# Patient Record
Sex: FEMALE | Race: WHITE | NOT HISPANIC OR LATINO | Employment: OTHER | URBAN - METROPOLITAN AREA
[De-identification: names, ages, dates, MRNs, and addresses within clinical notes are randomized per-mention and may not be internally consistent; named-entity substitution may affect disease eponyms.]

---

## 2019-07-23 ENCOUNTER — OFFICE VISIT (OUTPATIENT)
Dept: MEDICAL GROUP | Age: 64
End: 2019-07-23
Payer: COMMERCIAL

## 2019-07-23 VITALS
HEART RATE: 66 BPM | DIASTOLIC BLOOD PRESSURE: 72 MMHG | OXYGEN SATURATION: 95 % | HEIGHT: 63 IN | TEMPERATURE: 97.9 F | SYSTOLIC BLOOD PRESSURE: 120 MMHG | BODY MASS INDEX: 25.16 KG/M2 | WEIGHT: 142 LBS

## 2019-07-23 DIAGNOSIS — F33.1 MODERATE EPISODE OF RECURRENT MAJOR DEPRESSIVE DISORDER (HCC): ICD-10-CM

## 2019-07-23 DIAGNOSIS — F41.9 ANXIETY: ICD-10-CM

## 2019-07-23 DIAGNOSIS — M75.122 NONTRAUMATIC COMPLETE TEAR OF LEFT ROTATOR CUFF: ICD-10-CM

## 2019-07-23 DIAGNOSIS — M54.41 CHRONIC BILATERAL LOW BACK PAIN WITH BILATERAL SCIATICA: ICD-10-CM

## 2019-07-23 DIAGNOSIS — G89.29 CHRONIC BILATERAL LOW BACK PAIN WITH BILATERAL SCIATICA: ICD-10-CM

## 2019-07-23 DIAGNOSIS — M54.42 CHRONIC BILATERAL LOW BACK PAIN WITH BILATERAL SCIATICA: ICD-10-CM

## 2019-07-23 DIAGNOSIS — E78.00 HYPERCHOLESTEROLEMIA: ICD-10-CM

## 2019-07-23 PROCEDURE — 99204 OFFICE O/P NEW MOD 45 MIN: CPT | Performed by: INTERNAL MEDICINE

## 2019-07-23 RX ORDER — CYCLOBENZAPRINE HCL 10 MG
10 TABLET ORAL 3 TIMES DAILY PRN
COMMUNITY
End: 2019-07-23

## 2019-07-23 RX ORDER — ESCITALOPRAM OXALATE 20 MG/1
20 TABLET ORAL DAILY
Qty: 90 TAB | Refills: 3 | Status: SHIPPED | OUTPATIENT
Start: 2019-07-23 | End: 2020-03-27 | Stop reason: SDUPTHER

## 2019-07-23 RX ORDER — MELOXICAM 7.5 MG/1
7.5 TABLET ORAL DAILY
COMMUNITY
End: 2019-07-23

## 2019-07-23 RX ORDER — MIRTAZAPINE 7.5 MG/1
7.5 TABLET, FILM COATED ORAL
Qty: 90 TAB | Refills: 3 | Status: SHIPPED | OUTPATIENT
Start: 2019-07-23 | End: 2020-03-27 | Stop reason: SDUPTHER

## 2019-07-23 RX ORDER — SIMVASTATIN 40 MG
40 TABLET ORAL EVERY EVENING
Qty: 90 TAB | Refills: 3 | Status: SHIPPED | OUTPATIENT
Start: 2019-07-23 | End: 2020-03-27 | Stop reason: SDUPTHER

## 2019-07-23 RX ORDER — MIRTAZAPINE 15 MG/1
15 TABLET, FILM COATED ORAL NIGHTLY
COMMUNITY
End: 2019-07-23 | Stop reason: SDUPTHER

## 2019-07-23 RX ORDER — ESCITALOPRAM OXALATE 10 MG/1
10 TABLET ORAL DAILY
COMMUNITY
End: 2019-07-23 | Stop reason: SDUPTHER

## 2019-07-23 RX ORDER — SIMVASTATIN 40 MG
40 TABLET ORAL NIGHTLY
COMMUNITY
End: 2019-07-23 | Stop reason: SDUPTHER

## 2019-07-23 ASSESSMENT — PATIENT HEALTH QUESTIONNAIRE - PHQ9: CLINICAL INTERPRETATION OF PHQ2 SCORE: 0

## 2019-07-23 ASSESSMENT — PAIN SCALES - GENERAL: PAINLEVEL: 7=MODERATE-SEVERE PAIN

## 2019-07-24 ENCOUNTER — HOSPITAL ENCOUNTER (OUTPATIENT)
Dept: LAB | Facility: MEDICAL CENTER | Age: 64
End: 2019-07-24
Attending: INTERNAL MEDICINE
Payer: COMMERCIAL

## 2019-07-24 DIAGNOSIS — F41.9 ANXIETY: ICD-10-CM

## 2019-07-24 DIAGNOSIS — M54.41 CHRONIC BILATERAL LOW BACK PAIN WITH BILATERAL SCIATICA: ICD-10-CM

## 2019-07-24 DIAGNOSIS — F33.1 MODERATE EPISODE OF RECURRENT MAJOR DEPRESSIVE DISORDER (HCC): ICD-10-CM

## 2019-07-24 DIAGNOSIS — M54.42 CHRONIC BILATERAL LOW BACK PAIN WITH BILATERAL SCIATICA: ICD-10-CM

## 2019-07-24 DIAGNOSIS — G89.29 CHRONIC BILATERAL LOW BACK PAIN WITH BILATERAL SCIATICA: ICD-10-CM

## 2019-07-24 DIAGNOSIS — E78.00 HYPERCHOLESTEROLEMIA: ICD-10-CM

## 2019-07-24 LAB
ALBUMIN SERPL BCP-MCNC: 4.2 G/DL (ref 3.2–4.9)
ALBUMIN/GLOB SERPL: 1.4 G/DL
ALP SERPL-CCNC: 78 U/L (ref 30–99)
ALT SERPL-CCNC: 24 U/L (ref 2–50)
ANION GAP SERPL CALC-SCNC: 8 MMOL/L (ref 0–11.9)
AST SERPL-CCNC: 18 U/L (ref 12–45)
BASOPHILS # BLD AUTO: 1.3 % (ref 0–1.8)
BASOPHILS # BLD: 0.07 K/UL (ref 0–0.12)
BILIRUB SERPL-MCNC: 0.6 MG/DL (ref 0.1–1.5)
BUN SERPL-MCNC: 17 MG/DL (ref 8–22)
CALCIUM SERPL-MCNC: 9.7 MG/DL (ref 8.5–10.5)
CHLORIDE SERPL-SCNC: 105 MMOL/L (ref 96–112)
CHOLEST SERPL-MCNC: 225 MG/DL (ref 100–199)
CO2 SERPL-SCNC: 27 MMOL/L (ref 20–33)
CREAT SERPL-MCNC: 0.82 MG/DL (ref 0.5–1.4)
EOSINOPHIL # BLD AUTO: 0.13 K/UL (ref 0–0.51)
EOSINOPHIL NFR BLD: 2.5 % (ref 0–6.9)
ERYTHROCYTE [DISTWIDTH] IN BLOOD BY AUTOMATED COUNT: 45.8 FL (ref 35.9–50)
FASTING STATUS PATIENT QL REPORTED: NORMAL
GLOBULIN SER CALC-MCNC: 3 G/DL (ref 1.9–3.5)
GLUCOSE SERPL-MCNC: 89 MG/DL (ref 65–99)
HCT VFR BLD AUTO: 40.8 % (ref 37–47)
HDLC SERPL-MCNC: 47 MG/DL
HGB BLD-MCNC: 13.5 G/DL (ref 12–16)
IMM GRANULOCYTES # BLD AUTO: 0.01 K/UL (ref 0–0.11)
IMM GRANULOCYTES NFR BLD AUTO: 0.2 % (ref 0–0.9)
LDLC SERPL CALC-MCNC: 142 MG/DL
LYMPHOCYTES # BLD AUTO: 1.59 K/UL (ref 1–4.8)
LYMPHOCYTES NFR BLD: 30.4 % (ref 22–41)
MCH RBC QN AUTO: 30.9 PG (ref 27–33)
MCHC RBC AUTO-ENTMCNC: 33.1 G/DL (ref 33.6–35)
MCV RBC AUTO: 93.4 FL (ref 81.4–97.8)
MONOCYTES # BLD AUTO: 0.44 K/UL (ref 0–0.85)
MONOCYTES NFR BLD AUTO: 8.4 % (ref 0–13.4)
NEUTROPHILS # BLD AUTO: 2.99 K/UL (ref 2–7.15)
NEUTROPHILS NFR BLD: 57.2 % (ref 44–72)
NRBC # BLD AUTO: 0 K/UL
NRBC BLD-RTO: 0 /100 WBC
PLATELET # BLD AUTO: 384 K/UL (ref 164–446)
PMV BLD AUTO: 9.4 FL (ref 9–12.9)
POTASSIUM SERPL-SCNC: 4 MMOL/L (ref 3.6–5.5)
PROT SERPL-MCNC: 7.2 G/DL (ref 6–8.2)
RBC # BLD AUTO: 4.37 M/UL (ref 4.2–5.4)
SODIUM SERPL-SCNC: 140 MMOL/L (ref 135–145)
T4 FREE SERPL-MCNC: 0.74 NG/DL (ref 0.53–1.43)
TRIGL SERPL-MCNC: 181 MG/DL (ref 0–149)
TSH SERPL DL<=0.005 MIU/L-ACNC: 1.26 UIU/ML (ref 0.38–5.33)
WBC # BLD AUTO: 5.2 K/UL (ref 4.8–10.8)

## 2019-07-24 PROCEDURE — 85025 COMPLETE CBC W/AUTO DIFF WBC: CPT

## 2019-07-24 PROCEDURE — 84439 ASSAY OF FREE THYROXINE: CPT

## 2019-07-24 PROCEDURE — 36415 COLL VENOUS BLD VENIPUNCTURE: CPT

## 2019-07-24 PROCEDURE — 80053 COMPREHEN METABOLIC PANEL: CPT

## 2019-07-24 PROCEDURE — 84443 ASSAY THYROID STIM HORMONE: CPT

## 2019-07-24 PROCEDURE — 80061 LIPID PANEL: CPT

## 2019-07-24 NOTE — PROGRESS NOTES
Parminder Merlos is a 64 y.o. female here to establish care and the evaluation and management of:    HPI:    She recently moved to the area from [place], [place]. She is  and has two grown children. Prior history of alcohol abuse associated with liver disease. She stopped drinking in 2016. She was a former smoker for 20 years and reportedly quit in 2009. Significant family history of cardiac disease.      Moderate episode of recurrent major depressive disorder (HCC)  Anxiety  She has a chronic history of anxiety and depression since the age of 14. She suffered from anxiety attacks during childhood. Her depression and mood are under good control with Lexapro 20 mg once daily and Mirtazapine 7.5 mg once daily. She was previously followed by psychiatry and would like to establish with a provider in the area. Negative for suicidal ideations or plan. She has good support through her spouse.  Lexapro was increased from 10 mg daily to 20 mg daily by psychiatrist recently.    Hypercholesterolemia  Chronic, stable history of hypercholesteremia. Currently taking Simvastatin 40 mg once daily as directed. No medication side effects were reported including myalgias or abdominal pain. She is not taking a daily Aspirin. No acute complaints of dizziness, claudication or chest pain.     Chronic bilateral low back pain with bilateral sciatica  She has a 1-2 year history of chronic pain to her low back radiating to her hips and bilateral lower extremities. She has had imaging completed in the past revealing arthritic changes. Pain is constant and exacerbated with walking up stairs. She is treating her symptoms with Meloxicam 7.5 mg once daily and Flexeril 5 mg once daily as needed. She has taken Meloxicam daily for the past two years. No active bleeding, hematochezia or melena. Pain is unchanged with Tylenol. She denies any opioid use. She has completed physical therapy in the past with no improvement. She additionally  complains of joint pain to her hands. She previously tested negative for rheumatoid arthritis. She has a history of alcohol abuse associated with liver disease. She stopped drinking alcohol in 2016.     Nontraumatic complete tear of left rotator cuff  She has a history of a rotator cuff tear to her left shoulder which was visualized on MRI completed this year in [place]. She was previously informed that she would require surgical repair. She is requesting a referral to Orthopaedics to establish care.      Current medicines (including changes today)  Current Outpatient Prescriptions   Medication Sig Dispense Refill   • mirtazapine (REMERON) 7.5 MG tablet Take 1 Tab by mouth every bedtime. 90 Tab 3   • escitalopram (LEXAPRO) 20 MG tablet Take 1 Tab by mouth every day. 90 Tab 3   • simvastatin (ZOCOR) 40 MG Tab Take 1 Tab by mouth every evening. 90 Tab 3     No current facility-administered medications for this visit.      She  has a past medical history of Anxiety; Depression; and Hyperlipidemia.  She  has a past surgical history that includes primary c section and shoulder surgery (Left).  Social History   Substance Use Topics   • Smoking status: Former Smoker     Packs/day: 1.00     Years: 20.00     Types: Cigarettes     Quit date: 2009   • Smokeless tobacco: Never Used   • Alcohol use No      Comment: former alcoholic, stopped 06/12/2016     Social History     Social History Narrative   • No narrative on file     Family History   Problem Relation Age of Onset   • Dementia Mother    • Heart Disease Mother         pacemaker   • Heart Disease Father         congestive heart failure   • Alcohol/Drug Paternal Uncle    • Dementia Maternal Grandmother    • Alcohol/Drug Maternal Grandfather    • Stroke Paternal Grandmother    • Heart Attack Paternal Grandfather      No family status information on file.     The patient is not eligible for Health Maintenance      ROS  Gen.: Denied weight change, appetite change or  "fatigue.  ENT: Denied sinus tenderness, nasal congestion, runny nose or sore throat  CVS: Denied chest pain, palpitations or leg swelling.  Respiratory: Denied cough, shortness of breath or wheezing.  GI: Denied abdominal pain, constipation or diarrhea.  Endocrine: Denied temperature intolerance, increased frequency of urination, polyphagia or polydipsia.  Musculoskeletal: Lower back pain and hips pain.    All other systems reviewed and are negative. See HPI for further details.       Objective:     /72 (BP Location: Right arm, Patient Position: Sitting, BP Cuff Size: Adult)   Pulse 66   Temp 36.6 °C (97.9 °F) (Temporal)   Ht 1.607 m (5' 3.27\")   Wt 64.4 kg (142 lb)   SpO2 95%  Body mass index is 24.94 kg/m².     Physical Exam:  Constitutional: Well nourished and Well developed, Alert, no distress.  Skin: Warm, dry, good turgor, no rashes in visible areas.  Eye: Equal, round and reactive, conjunctiva clear, lids normal.  ENMT: Lips without lesions, good dentition, oropharynx clear.  Neck: Trachea midline, no masses, no thyromegaly. No cervical or supraclavicular lymphadenopathy.  Respiratory: Unlabored respiratory effort, lungs clear to auscultation, no wheezes, no rhonchi.  Cardiovascular: Normal S1, S2, no murmur, no edema. No carotid bruits.  Abdomen: Soft, non distended, non-tender, no masses, no hepatosplenomegaly. Bowel sound normal.  Extremities: No edema, no clubbing, no cyanosis.  Psych: Alert and oriented x3, normal affect and mood.      Assessment and Plan:   The following treatment plan was discussed:    1. Hypercholesterolemia  - Plan to complete labs to establish a baseline. Plan to continue current regimen of Simvastatin 40 mg once daily. Reviewed the risks and benefits of treatment and potential side effects of medication.  - Recommended she follow low fat, low carbohydrate and high fiber diet. Additionally, patient was asked to exercise regularly including frequent cardio.  - Recheck lab " 1-2 weeks before next follow up visit.  - simvastatin (ZOCOR) 40 MG Tab; Take 1 Tab by mouth every evening.  Dispense: 90 Tab; Refill: 3  - Comp Metabolic Panel; Future  - Lipid Profile; Future    2. Chronic bilateral low back pain with bilateral sciatica  - Chronic, stable history for 1-2 years of chronic pain to low back radiating to hips and bilateral lower extremities. Per patient, imaging has indicated osteoarthritis. General complaint of diffuse joint pain. Previously tested negative for rheumatoid arthritis.  - She was encouraged to discontinue Meloxicam secondary to medication interactions of possible GI bleed with Lexapro. Meloxicam has been taken daily for the past two years. No active bleeding, hematochezia or melena.  - Low dose Tylenol is recommended for pain. She was asked to avoid NSAIDs due to their medication side effects.  - She was encouraged to complete gentle stretching and exercises of her back.  - Flexeril will not be refilled.   - She will be referred to Physiatry.  - CBC WITH DIFFERENTIAL; Future  - Comp Metabolic Panel; Future  - REFERRAL TO PHYSIATRY (PMR)    3. Moderate episode of recurrent major depressive disorder (HCC)  - Chronic, stable history of depression. Under good control with Lexapro 20 mg once daily and Mirtazapine 7.5 mg once daily. Refills were provided.  - She was asked to seek medical attention for suicidal ideations or plan.   - Plan to complete routine labs.  - The referral to psychiatrist was placed.  - mirtazapine (REMERON) 7.5 MG tablet; Take 1 Tab by mouth every bedtime.  Dispense: 90 Tab; Refill: 3  - escitalopram (LEXAPRO) 20 MG tablet; Take 1 Tab by mouth every day.  Dispense: 90 Tab; Refill: 3  - CBC WITH DIFFERENTIAL; Future  - Comp Metabolic Panel; Future  - TSH; Future  - FREE THYROXINE; Future  - REFERRAL TO PSYCHIATRY    4. Anxiety  - Chronic, stable history of depression. Under good control with Lexapro 20 mg once daily and Mirtazapine 7.5 mg once daily.    - She was asked to seek medical attention for suicidal ideations or plan.   - Plan to complete routine labs.  - CBC WITH DIFFERENTIAL; Future  - Comp Metabolic Panel; Future  - REFERRAL TO PSYCHIATRY    5. Nontraumatic complete tear of left rotator cuff  - Low dose Tylenol is recommended for pain. She was asked to avoid NSAIDs due to their medication side effects.  Refer to orthopedist.  - REFERRAL TO ORTHOPEDICS      Records requested.    Followup: Return in about 1 week (around 7/30/2019), or if symptoms worsen or fail to improve, for Hyperlipidemia, lower back pain, Depression, Anxiety, Lab review. Return sooner should new symptoms or problems arise. Recommended for her to seek medical attention for worsening depression, anxiety, suicidal ideations or plan.      Please note that this dictation was created using voice recognition software. I have made every reasonable attempt to correct obvious errors, but I expect that there may have unintended errors in text, spelling, punctuation, or grammar that I did not discover.    IMarianne (Scribe), am scribing for, and in the presence of, Ban Covington M.D.    Electronically signed by: Marianne Whelan (Scribe), 7/23/2019    I, Ban Covington M.D., personally performed the services described in this documentation, as scribed by Marianne Whelan in my presence, and it is both accurate and complete.

## 2019-07-30 ENCOUNTER — OFFICE VISIT (OUTPATIENT)
Dept: MEDICAL GROUP | Age: 64
End: 2019-07-30
Payer: COMMERCIAL

## 2019-07-30 VITALS
SYSTOLIC BLOOD PRESSURE: 100 MMHG | BODY MASS INDEX: 25.12 KG/M2 | HEART RATE: 77 BPM | OXYGEN SATURATION: 96 % | DIASTOLIC BLOOD PRESSURE: 56 MMHG | WEIGHT: 141.8 LBS | HEIGHT: 63 IN | TEMPERATURE: 99.1 F

## 2019-07-30 DIAGNOSIS — E78.00 HYPERCHOLESTEROLEMIA: ICD-10-CM

## 2019-07-30 DIAGNOSIS — Z11.59 NEED FOR HEPATITIS C SCREENING TEST: ICD-10-CM

## 2019-07-30 DIAGNOSIS — G89.29 CHRONIC BILATERAL LOW BACK PAIN WITH BILATERAL SCIATICA: ICD-10-CM

## 2019-07-30 DIAGNOSIS — M54.41 CHRONIC BILATERAL LOW BACK PAIN WITH BILATERAL SCIATICA: ICD-10-CM

## 2019-07-30 DIAGNOSIS — F33.1 MODERATE EPISODE OF RECURRENT MAJOR DEPRESSIVE DISORDER (HCC): ICD-10-CM

## 2019-07-30 DIAGNOSIS — F41.9 ANXIETY: ICD-10-CM

## 2019-07-30 DIAGNOSIS — M54.42 CHRONIC BILATERAL LOW BACK PAIN WITH BILATERAL SCIATICA: ICD-10-CM

## 2019-07-30 PROCEDURE — 99214 OFFICE O/P EST MOD 30 MIN: CPT | Performed by: INTERNAL MEDICINE

## 2019-07-30 RX ORDER — GABAPENTIN 300 MG/1
300 CAPSULE ORAL 3 TIMES DAILY PRN
Qty: 90 CAP | Refills: 3 | Status: SHIPPED | OUTPATIENT
Start: 2019-07-30 | End: 2019-09-20

## 2019-07-30 ASSESSMENT — PATIENT HEALTH QUESTIONNAIRE - PHQ9
SUM OF ALL RESPONSES TO PHQ QUESTIONS 1-9: 0
SUM OF ALL RESPONSES TO PHQ9 QUESTIONS 1 AND 2: 0
7. TROUBLE CONCENTRATING ON THINGS, SUCH AS READING THE NEWSPAPER OR WATCHING TELEVISION: NOT AT ALL
9. THOUGHTS THAT YOU WOULD BE BETTER OFF DEAD, OR OF HURTING YOURSELF: NOT AT ALL
4. FEELING TIRED OR HAVING LITTLE ENERGY: NOT AT ALL
2. FEELING DOWN, DEPRESSED, IRRITABLE, OR HOPELESS: NOT AT ALL
8. MOVING OR SPEAKING SO SLOWLY THAT OTHER PEOPLE COULD HAVE NOTICED. OR THE OPPOSITE, BEING SO FIGETY OR RESTLESS THAT YOU HAVE BEEN MOVING AROUND A LOT MORE THAN USUAL: NOT AT ALL
1. LITTLE INTEREST OR PLEASURE IN DOING THINGS: NOT AT ALL
6. FEELING BAD ABOUT YOURSELF - OR THAT YOU ARE A FAILURE OR HAVE LET YOURSELF OR YOUR FAMILY DOWN: NOT AL ALL
5. POOR APPETITE OR OVEREATING: NOT AT ALL
3. TROUBLE FALLING OR STAYING ASLEEP OR SLEEPING TOO MUCH: NOT AT ALL

## 2019-07-30 ASSESSMENT — PAIN SCALES - GENERAL: PAINLEVEL: 8=MODERATE-SEVERE PAIN

## 2019-07-30 NOTE — PROGRESS NOTES
Subjective:   Lizy Mak is a 64 y.o. female here today for evaluation and management of:    Hypercholesterolemia  Patient is taking Simvastatin 40 mg every evening. Recent blood tests done on 7/24/19 indicate elevated cholesterol levels. She has been on Simvastatin in the past. She went off after moving to [place] because she did not have a prescription but started Simvastatin again 2 months ago. She will keep current regimens for now since she just restarted medication and will recheck labs in 3 months.     Results for LIZY MAK (MRN [id number]) as of 7/30/2019 15:32   Ref. Range 7/24/2019 07:55   Cholesterol,Tot Latest Ref Range: 100 - 199 mg/dL 225 (H)   Triglycerides Latest Ref Range: 0 - 149 mg/dL 181 (H)   HDL Latest Ref Range: >=40 mg/dL 47   LDL Latest Ref Range: <100 mg/dL 142 (H)     Moderate episode of recurrent major depressive disorder (HCC)  Anxiety  Chronic history of anxiety and depression. Her depression and mood are under good control with Lexapro 20 mg once daily and Mirtazapine 7.5 mg once daily. Patient takes Mirtazapine to help her depression and to help her sleep and she states that this medication works well for her. She denies side effects from taking Lexapro or Mirtazapine. She denies suicidal ideation or plan or homicidal ideation or plan. She was referred to psychiatrist during her last clinic visit who she has not yet seen. Patient is  and lives with her . She has a happy marriage and good relationship with her . Additionally, patient states that she went to a job interview today.     Chronic bilateral low back pain with bilateral sciatica  Patient has chronic bilateral lower back pain with shooting pain to her hips that radiates down both lower extremities. She had imaging done in the past about 1-2 years ago revealing arthritic changes. Her pain is constant and exacerbated with walking. She has taken Meloxicam 7.5 mg in the past for many years but stopped after  "her last clinic visit due to medication interactions and possibility of GI bleed while taking Lexapro. During last clinic visit she was referred to physiatry which she has received a letter from but has not yet had the chance to see. Discussed treating her pain with Gabapentin. She would like to try this medication and still plans to follow up with physiatry.       Current medicines (including changes today)  Current Outpatient Prescriptions   Medication Sig Dispense Refill   • gabapentin (NEURONTIN) 300 MG Cap Take 1 Cap by mouth 3 times a day as needed. 90 Cap 3   • mirtazapine (REMERON) 7.5 MG tablet Take 1 Tab by mouth every bedtime. 90 Tab 3   • escitalopram (LEXAPRO) 20 MG tablet Take 1 Tab by mouth every day. 90 Tab 3   • simvastatin (ZOCOR) 40 MG Tab Take 1 Tab by mouth every evening. 90 Tab 3     No current facility-administered medications for this visit.      She  has a past medical history of Anxiety; Depression; and Hyperlipidemia.    ROS   No chest pain, no shortness of breath, no abdominal pain       Objective:     /56 (BP Location: Right arm, Patient Position: Sitting, BP Cuff Size: Adult)   Pulse 77   Temp 37.3 °C (99.1 °F) (Temporal)   Ht 1.607 m (5' 3.27\")   Wt 64.3 kg (141 lb 12.8 oz)   SpO2 96%  Body mass index is 24.9 kg/m².     Physical Exam:  General: Alert, oriented and no acute distress.  Eye contact is good, speech goal directed, affect calm  HEENT: conjunctiva non-injected, sclera non-icteric.  Oral mucous membranes pink and moist with no lesions.  Pinna normal.   Lungs: Normal respiratory effort, clear to auscultation bilaterally with good excursion.  CV: regular rate and rhythm. No murmurs.   Abdomen: soft, non distended, nontender, Bowel sound normal.  Ext: no edema, color normal, vascularity normal, temperature normal    Assessment and Plan:   The following treatment plan was discussed     1. Hypercholesterolemia  - Not at goal. Plan to continue current regimens, " Simvastatin 40 mg once daily, and then repeat labs in 3 months.   - Advised to eat low fat, low carbohydrate and high fiber diet as well as do cardio physical exercise regularly.   - Comp Metabolic Panel; Future  - Lipid Profile; Future    2. Moderate episode of recurrent major depressive disorder (HCC)  - Stable and well-controlled. Continue current regimens, Lexapro 20 mg once daily and Mirtazapine 7.5 mg once daily. Recheck lab 1-2 weeks before next follow up visit.  - Referral to see a psychiatrist was already placed. She will follow-up.   - Patient advised to seek medication attention for suicidal ideations or plan.   - Comp Metabolic Panel; Future    3. Anxiety  - See 2 above.   - Comp Metabolic Panel; Future    4. Chronic bilateral low back pain with bilateral sciatica  - Patient continue to have chronic shooting pain. She is not currently taking any medication and would like to try Gabapentin. I discussed the medication side effects and risks and benefits of Gabapentin with the patient. She is agreeable and will stop if she experiences side effects.  Patient is advised to start gabapentin at bedtime first and she may increase the dose slowly if she tolerates.  - Patient will still follow up with physiatry.   - Discussed having imaging done. Patient agrees. Ordered for x-ray of lumbar spine.   - Patient advised not to take Gabapentin with alcohol or other sedating medications.  - gabapentin (NEURONTIN) 300 MG Cap; Take 1 Cap by mouth 3 times a day as needed.  Dispense: 90 Cap; Refill: 3  - DX-LUMBAR SPINE-2 OR 3 VIEWS; Future    5. Need for hepatitis C screening test  - Discussed the importance of age related hepatitis C screening. Patient agrees.   - HEP C VIRUS ANTIBODY; Future    6. Health Maintenance   - Patient is due for tetanus and shingles vaccine. She is also due for mammogram and colonoscopy. She states that she had recently had all of these done prior to moving to [place]. She will try and get  records.       Followup: Return in about 3 months (around 10/30/2019), or if symptoms worsen or fail to improve, for Hyperlipidemia, lower back pain, Depression, Anxiety, Lab review.      Please note that this dictation was created using voice recognition software. I have made every reasonable attempt to correct obvious errors, but I expect that there may have unintended errors in text, spelling, punctuation, or grammar that I did not discover.    I, Michelle Solorzano (Aniibpenelope), am scribing for, and in the presence of, Ban Covington M.D..    Electronically signed by: Michelle Solorzano (Aniibe), 7/30/2019    I, Ban Covington M.D., personally performed the services described in this documentation, as scribed by Michelle Solorzano in my presence, and it is both accurate and complete.

## 2019-07-30 NOTE — PROGRESS NOTES
Pt states had both Colonoscopy Oct./Nov. 2018 and Mammogram Jan/Feb. 2019 through [facility], [place], [place] w/ Dr. Kae Brito. Obtained pt signed consent.

## 2019-07-30 NOTE — LETTER
[facility]  Ban Covington M.D.  [address]  [place] [address]  Fax: [phone]   Authorization for Release/Disclosure of   Protected Health Information   Name: MANJINDER MAK : 1955 SSN: [id number]   Address: [address]  [place] [address] Phone:    [phone] (home)    I authorize the entity listed below to release/disclose the PHI below to:   [facility]/Ban Covington M.D. and Ban Covington M.D.   Provider or Entity Name:  [facility]   Dr. Sasha Brewer   [facility], [facility]  [address]  [place] [address] Phone:   [phone]    Fax:   [phone]   Reason for request: continuity of care   Information to be released:    [  ] LAST COLONOSCOPY,  including any PATH REPORT and follow-up  [  ] LAST FIT/COLOGUARD RESULT [  ] LAST DEXA  [  ] LAST MAMMOGRAM  [  ] LAST PAP  [  ] LAST LABS [  ] RETINA EXAM REPORT  [  ] IMMUNIZATION RECORDS  [XXX ] Release all info      [  ] Check here and initial the line next to each item to release ALL health information INCLUDING  _____ Care and treatment for drug and / or alcohol abuse  _____ HIV testing, infection status, or AIDS  _____ Genetic Testing    DATES OF SERVICE OR TIME PERIOD TO BE DISCLOSED: _____________  I understand and acknowledge that:  * This Authorization may be revoked at any time by you in writing, except if your health information has already been used or disclosed.  * Your health information that will be used or disclosed as a result of you signing this authorization could be re-disclosed by the recipient. If this occurs, your re-disclosed health information may no longer be protected by State or Federal laws.  * You may refuse to sign this Authorization. Your refusal will not affect your ability to obtain treatment.  * This Authorization becomes effective upon signing and will  on (date) __________.      If no date is indicated, this Authorization will  one (1) year  from the signature date.    Name: Lizy Merlos    Signature:   Date:     7/30/2019       PLEASE FAX REQUESTED RECORDS BACK TO: [phone]

## 2019-07-31 ENCOUNTER — HOSPITAL ENCOUNTER (OUTPATIENT)
Dept: RADIOLOGY | Facility: MEDICAL CENTER | Age: 64
End: 2019-07-31
Attending: INTERNAL MEDICINE
Payer: COMMERCIAL

## 2019-07-31 DIAGNOSIS — M54.41 CHRONIC BILATERAL LOW BACK PAIN WITH BILATERAL SCIATICA: ICD-10-CM

## 2019-07-31 DIAGNOSIS — M54.42 CHRONIC BILATERAL LOW BACK PAIN WITH BILATERAL SCIATICA: ICD-10-CM

## 2019-07-31 DIAGNOSIS — G89.29 CHRONIC BILATERAL LOW BACK PAIN WITH BILATERAL SCIATICA: ICD-10-CM

## 2019-07-31 PROCEDURE — 72100 X-RAY EXAM L-S SPINE 2/3 VWS: CPT

## 2019-08-06 ENCOUNTER — TELEPHONE (OUTPATIENT)
Dept: MEDICAL GROUP | Age: 64
End: 2019-08-06

## 2019-08-06 DIAGNOSIS — G47.30 SLEEP APNEA, UNSPECIFIED TYPE: ICD-10-CM

## 2019-08-06 NOTE — TELEPHONE ENCOUNTER
1. Caller Name: Lizy Merlos                                           Call Back Number: [phone] (home)         Patient approves a detailed voicemail message: N\A    2. SPECIFIC Action To Be Taken: Referral pending, please sign.    3. Diagnosis/Clinical Reason for Request: Sleep Apnea    4. Specialty & Provider Name/Lab/Imaging Location: Sleep Studies    5. Is appointment scheduled for requested order/referral: no    Patient was informed they will receive a return phone call from the office ONLY if there are any questions before processing their request. Advised to call back if they haven't received a call from the referral department in 5 days.

## 2019-08-12 ENCOUNTER — OFFICE VISIT (OUTPATIENT)
Dept: MEDICAL GROUP | Age: 64
End: 2019-08-12
Payer: COMMERCIAL

## 2019-08-12 VITALS
HEIGHT: 63 IN | DIASTOLIC BLOOD PRESSURE: 52 MMHG | BODY MASS INDEX: 25.69 KG/M2 | OXYGEN SATURATION: 95 % | SYSTOLIC BLOOD PRESSURE: 90 MMHG | WEIGHT: 145 LBS | TEMPERATURE: 98.5 F | HEART RATE: 68 BPM

## 2019-08-12 DIAGNOSIS — N30.00 ACUTE CYSTITIS WITHOUT HEMATURIA: ICD-10-CM

## 2019-08-12 LAB
APPEARANCE UR: CLEAR
BILIRUB UR STRIP-MCNC: NEGATIVE MG/DL
COLOR UR AUTO: YELLOW
GLUCOSE UR STRIP.AUTO-MCNC: NEGATIVE MG/DL
KETONES UR STRIP.AUTO-MCNC: NEGATIVE MG/DL
LEUKOCYTE ESTERASE UR QL STRIP.AUTO: NORMAL
NITRITE UR QL STRIP.AUTO: NEGATIVE
PH UR STRIP.AUTO: 7 [PH] (ref 5–8)
PROT UR QL STRIP: NEGATIVE MG/DL
RBC UR QL AUTO: NEGATIVE
SP GR UR STRIP.AUTO: 1.02
UROBILINOGEN UR STRIP-MCNC: NORMAL MG/DL

## 2019-08-12 PROCEDURE — 81002 URINALYSIS NONAUTO W/O SCOPE: CPT | Performed by: INTERNAL MEDICINE

## 2019-08-12 PROCEDURE — 99214 OFFICE O/P EST MOD 30 MIN: CPT | Performed by: INTERNAL MEDICINE

## 2019-08-12 RX ORDER — NITROFURANTOIN 25; 75 MG/1; MG/1
100 CAPSULE ORAL 2 TIMES DAILY
Qty: 10 CAP | Refills: 0 | Status: SHIPPED | OUTPATIENT
Start: 2019-08-12 | End: 2019-08-17

## 2019-08-12 ASSESSMENT — PAIN SCALES - GENERAL: PAINLEVEL: NO PAIN

## 2019-08-12 NOTE — PROGRESS NOTES
Subjective:   Lizy Mak is a 64 y.o. female here today for evaluation and management of:    HPI:  This is a new problem.  She complains of urinary frequency and burning dysuria. Her symptoms began two days ago. She has not tried any OTC medication or cranberry juice. She denies fever, chills, nausea, vomiting, or flank pain. She denies a history of recurrent UTIs. Her last UTI was many years ago. She has no known drug allergies. I discussed the urine test with the patient in clinic today.    Results for LIZY MAK (MRN [id number]) as of 8/12/2019 13:52   Ref. Range 8/12/2019 13:47   POC Color Latest Ref Range: Negative  Yellow   POC Appearance Latest Ref Range: Negative  Clear   POC Specific Gravity Latest Ref Range: <1.005 - >1.030  1.020   POC Urine PH Latest Ref Range: 5.0 - 8.0  7.0   POC Glucose Latest Ref Range: Negative mg/dL Negative   POC Ketones Latest Ref Range: Negative mg/dL Negative   POC Protein Latest Ref Range: Negative mg/dL Negative   POC Nitrites Latest Ref Range: Negative  Negative   POC Leukocyte Esterase Latest Ref Range: Negative  Small   POC Blood Latest Ref Range: Negative  Negative   POC Bilirubin Latest Ref Range: Negative mg/dL Negative   POC Urobiligen Latest Ref Range: Negative (0.2) mg/dL 0.2 E.U. /dL     Current medicines (including changes today)  Current Outpatient Medications   Medication Sig Dispense Refill   • Diclofenac Sodium 1 % Gel      • nitrofurantoin monohyd macro (MACROBID) 100 MG Cap Take 1 Cap by mouth 2 times a day for 5 days. 10 Cap 0   • gabapentin (NEURONTIN) 300 MG Cap Take 1 Cap by mouth 3 times a day as needed. 90 Cap 3   • mirtazapine (REMERON) 7.5 MG tablet Take 1 Tab by mouth every bedtime. 90 Tab 3   • escitalopram (LEXAPRO) 20 MG tablet Take 1 Tab by mouth every day. 90 Tab 3   • simvastatin (ZOCOR) 40 MG Tab Take 1 Tab by mouth every evening. 90 Tab 3     No current facility-administered medications for this visit.      She  has a past medical history  "of Anxiety, Depression, and Hyperlipidemia.    ROS   No chest pain, no shortness of breath, no abdominal pain  No fever, chills, nausea, vomiting, or flank pain     Objective:     BP (!) 90/52 (BP Location: Right arm, Patient Position: Sitting, BP Cuff Size: Adult)   Pulse 68   Temp 36.9 °C (98.5 °F) (Temporal)   Ht 1.607 m (5' 3.27\")   Wt 65.8 kg (145 lb)   SpO2 95%  Body mass index is 25.47 kg/m².   Physical Exam:  General: Alert, oriented and no acute distress.  Eye contact is good, speech goal directed, affect calm  HEENT: conjunctiva non-injected, sclera non-icteric.  Oral mucous membranes pink and moist with no lesions.  Pinna normal.   Lungs: Normal respiratory effort, clear to auscultation bilaterally with good excursion.  CV: regular rate and rhythm. No murmurs.  Abdomen: soft, non distended, nontender, No CVAT, Bowel sound normal.  Ext: no edema, color normal, vascularity normal, temperature normal      Assessment and Plan:   The following treatment plan was discussed     1. Acute cystitis without hematuria  - I explained that her urinary frequency and burning dysuria are indicative of UTI. Also, her urinalysis today was positive for slightly elevated leukocyte esterase.  - I prescribed Macrobid 100 mg to take 1 capsule twice a day for 5 days. I discussed the potential side effects of nitrofurantoin. I recommended she continue taking a probiotic to prevent diarrhea or yeast infection.  She is advised to try cranberry as well.  - I recommended copious fluid intake and avoidance of spicy foods and caffeinated drinks.  - POCT Urinalysis  - nitrofurantoin monohyd macro (MACROBID) 100 MG Cap; Take 1 Cap by mouth 2 times a day for 5 days.  Dispense: 10 Cap; Refill: 0    Followup: Return in about 3 months (around 11/7/2019), or if symptoms worsen or fail to improve, for Hyperlipidemia, Depression, Anxiety, lower back pain, Lab review.      Please note that this dictation was created using voice recognition " software. I have made every reasonable attempt to correct obvious errors, but I expect that there may have unintended errors in text, spelling, punctuation, or grammar that I did not discover.    I, Reyes Alexnadre (Scribe), am scribing for, and in the presence of, Ban Covington M.D..    Electronically signed by: Reyes Alexandre (Scribe), 8/12/2019    I, Ban Covington M.D., personally performed the services described in this documentation, as scribed by Reyes Alexandre in my presence, and it is both accurate and complete.

## 2019-09-20 ENCOUNTER — APPOINTMENT (OUTPATIENT)
Dept: ADMISSIONS | Facility: MEDICAL CENTER | Age: 64
End: 2019-09-20
Attending: ORTHOPAEDIC SURGERY
Payer: COMMERCIAL

## 2019-09-20 ENCOUNTER — HOSPITAL ENCOUNTER (OUTPATIENT)
Dept: LAB | Facility: MEDICAL CENTER | Age: 64
End: 2019-09-20
Attending: INTERNAL MEDICINE
Payer: COMMERCIAL

## 2019-09-20 DIAGNOSIS — E78.00 HYPERCHOLESTEROLEMIA: ICD-10-CM

## 2019-09-20 DIAGNOSIS — Z11.59 NEED FOR HEPATITIS C SCREENING TEST: ICD-10-CM

## 2019-09-20 DIAGNOSIS — Z01.810 PRE-OPERATIVE CARDIOVASCULAR EXAMINATION: ICD-10-CM

## 2019-09-20 DIAGNOSIS — F33.1 MODERATE EPISODE OF RECURRENT MAJOR DEPRESSIVE DISORDER (HCC): ICD-10-CM

## 2019-09-20 DIAGNOSIS — F41.9 ANXIETY: ICD-10-CM

## 2019-09-20 LAB
ALBUMIN SERPL BCP-MCNC: 4.7 G/DL (ref 3.2–4.9)
ALBUMIN/GLOB SERPL: 2 G/DL
ALP SERPL-CCNC: 64 U/L (ref 30–99)
ALT SERPL-CCNC: 29 U/L (ref 2–50)
ANION GAP SERPL CALC-SCNC: 7 MMOL/L (ref 0–11.9)
AST SERPL-CCNC: 22 U/L (ref 12–45)
BILIRUB SERPL-MCNC: 0.6 MG/DL (ref 0.1–1.5)
BUN SERPL-MCNC: 16 MG/DL (ref 8–22)
CALCIUM SERPL-MCNC: 10.1 MG/DL (ref 8.5–10.5)
CHLORIDE SERPL-SCNC: 109 MMOL/L (ref 96–112)
CHOLEST SERPL-MCNC: 156 MG/DL (ref 100–199)
CO2 SERPL-SCNC: 24 MMOL/L (ref 20–33)
CREAT SERPL-MCNC: 0.77 MG/DL (ref 0.5–1.4)
EKG IMPRESSION: NORMAL
FASTING STATUS PATIENT QL REPORTED: NORMAL
GLOBULIN SER CALC-MCNC: 2.4 G/DL (ref 1.9–3.5)
GLUCOSE SERPL-MCNC: 80 MG/DL (ref 65–99)
HCV AB SER QL: NEGATIVE
HDLC SERPL-MCNC: 77 MG/DL
LDLC SERPL CALC-MCNC: 62 MG/DL
POTASSIUM SERPL-SCNC: 4.2 MMOL/L (ref 3.6–5.5)
PROT SERPL-MCNC: 7.1 G/DL (ref 6–8.2)
SODIUM SERPL-SCNC: 140 MMOL/L (ref 135–145)
TRIGL SERPL-MCNC: 85 MG/DL (ref 0–149)

## 2019-09-20 PROCEDURE — 80053 COMPREHEN METABOLIC PANEL: CPT

## 2019-09-20 PROCEDURE — 86803 HEPATITIS C AB TEST: CPT

## 2019-09-20 PROCEDURE — 36415 COLL VENOUS BLD VENIPUNCTURE: CPT

## 2019-09-20 PROCEDURE — 80061 LIPID PANEL: CPT

## 2019-09-20 RX ORDER — M-VIT,TX,IRON,MINS/CALC/FOLIC 27MG-0.4MG
1 TABLET ORAL DAILY
COMMUNITY
End: 2020-09-10

## 2019-09-20 RX ORDER — TOPIRAMATE 25 MG/1
25 TABLET ORAL PRN
COMMUNITY
End: 2020-03-27

## 2019-09-20 RX ORDER — CHLORAL HYDRATE 500 MG
1000 CAPSULE ORAL DAILY
COMMUNITY
End: 2020-12-11

## 2019-09-20 RX ORDER — ACETAMINOPHEN 500 MG
500-1000 TABLET ORAL EVERY 6 HOURS PRN
COMMUNITY
End: 2020-09-24

## 2019-09-20 RX ORDER — COVID-19 ANTIGEN TEST
KIT MISCELLANEOUS
COMMUNITY
End: 2020-03-27

## 2019-09-20 NOTE — OR NURSING
"Reviewed \"Preparing for your procedure\". Instructed to not take any medications DOS. Instructed to bring extra large button down shirt.   "

## 2019-10-01 ENCOUNTER — ANESTHESIA (OUTPATIENT)
Dept: SURGERY | Facility: MEDICAL CENTER | Age: 64
End: 2019-10-01
Payer: COMMERCIAL

## 2019-10-01 ENCOUNTER — HOSPITAL ENCOUNTER (OUTPATIENT)
Facility: MEDICAL CENTER | Age: 64
End: 2019-10-01
Attending: ORTHOPAEDIC SURGERY | Admitting: ORTHOPAEDIC SURGERY
Payer: COMMERCIAL

## 2019-10-01 ENCOUNTER — ANESTHESIA EVENT (OUTPATIENT)
Dept: SURGERY | Facility: MEDICAL CENTER | Age: 64
End: 2019-10-01
Payer: COMMERCIAL

## 2019-10-01 VITALS
HEIGHT: 63 IN | SYSTOLIC BLOOD PRESSURE: 131 MMHG | HEART RATE: 60 BPM | DIASTOLIC BLOOD PRESSURE: 84 MMHG | RESPIRATION RATE: 16 BRPM | OXYGEN SATURATION: 94 % | TEMPERATURE: 97.9 F | BODY MASS INDEX: 26.05 KG/M2 | WEIGHT: 147.05 LBS

## 2019-10-01 PROCEDURE — C1713 ANCHOR/SCREW BN/BN,TIS/BN: HCPCS | Performed by: ORTHOPAEDIC SURGERY

## 2019-10-01 PROCEDURE — 700101 HCHG RX REV CODE 250: Performed by: ANESTHESIOLOGY

## 2019-10-01 PROCEDURE — 700101 HCHG RX REV CODE 250: Performed by: ORTHOPAEDIC SURGERY

## 2019-10-01 PROCEDURE — 160036 HCHG PACU - EA ADDL 30 MINS PHASE I: Performed by: ORTHOPAEDIC SURGERY

## 2019-10-01 PROCEDURE — 160025 RECOVERY II MINUTES (STATS): Performed by: ORTHOPAEDIC SURGERY

## 2019-10-01 PROCEDURE — 160041 HCHG SURGERY MINUTES - EA ADDL 1 MIN LEVEL 4: Performed by: ORTHOPAEDIC SURGERY

## 2019-10-01 PROCEDURE — 500151 HCHG CANNULA, THRDED 8.4: Performed by: ORTHOPAEDIC SURGERY

## 2019-10-01 PROCEDURE — 160048 HCHG OR STATISTICAL LEVEL 1-5: Performed by: ORTHOPAEDIC SURGERY

## 2019-10-01 PROCEDURE — 500423 HCHG DRESSING, ABD COMBINE: Performed by: ORTHOPAEDIC SURGERY

## 2019-10-01 PROCEDURE — 160009 HCHG ANES TIME/MIN: Performed by: ORTHOPAEDIC SURGERY

## 2019-10-01 PROCEDURE — 700111 HCHG RX REV CODE 636 W/ 250 OVERRIDE (IP): Performed by: ORTHOPAEDIC SURGERY

## 2019-10-01 PROCEDURE — 160002 HCHG RECOVERY MINUTES (STAT): Performed by: ORTHOPAEDIC SURGERY

## 2019-10-01 PROCEDURE — 160029 HCHG SURGERY MINUTES - 1ST 30 MINS LEVEL 4: Performed by: ORTHOPAEDIC SURGERY

## 2019-10-01 PROCEDURE — 160035 HCHG PACU - 1ST 60 MINS PHASE I: Performed by: ORTHOPAEDIC SURGERY

## 2019-10-01 PROCEDURE — A6222 GAUZE <=16 IN NO W/SAL W/O B: HCPCS | Performed by: ORTHOPAEDIC SURGERY

## 2019-10-01 PROCEDURE — 500028 HCHG ARTHROWAND TURBOVAC 3.5/90 SUCT.: Performed by: ORTHOPAEDIC SURGERY

## 2019-10-01 PROCEDURE — 700105 HCHG RX REV CODE 258: Performed by: ORTHOPAEDIC SURGERY

## 2019-10-01 PROCEDURE — 700111 HCHG RX REV CODE 636 W/ 250 OVERRIDE (IP): Performed by: ANESTHESIOLOGY

## 2019-10-01 PROCEDURE — 160046 HCHG PACU - 1ST 60 MINS PHASE II: Performed by: ORTHOPAEDIC SURGERY

## 2019-10-01 PROCEDURE — 501838 HCHG SUTURE GENERAL: Performed by: ORTHOPAEDIC SURGERY

## 2019-10-01 PROCEDURE — 160022 HCHG BLOCK: Performed by: ORTHOPAEDIC SURGERY

## 2019-10-01 PROCEDURE — 502581 HCHG PACK, SHOULDER ARTHROSCOPY: Performed by: ORTHOPAEDIC SURGERY

## 2019-10-01 DEVICE — SUTURE ANCHOR HEALICOIL REGENESORB 5.5MM: Type: IMPLANTABLE DEVICE | Site: SHOULDER | Status: FUNCTIONAL

## 2019-10-01 DEVICE — SUTURE ANCHOR 2.8MM: Type: IMPLANTABLE DEVICE | Site: SHOULDER | Status: FUNCTIONAL

## 2019-10-01 RX ORDER — EPINEPHRINE 1 MG/ML(1)
AMPUL (ML) INJECTION
Status: DISCONTINUED | OUTPATIENT
Start: 2019-10-01 | End: 2019-10-01 | Stop reason: HOSPADM

## 2019-10-01 RX ORDER — DEXAMETHASONE SODIUM PHOSPHATE 4 MG/ML
INJECTION, SOLUTION INTRA-ARTICULAR; INTRALESIONAL; INTRAMUSCULAR; INTRAVENOUS; SOFT TISSUE PRN
Status: DISCONTINUED | OUTPATIENT
Start: 2019-10-01 | End: 2019-10-01 | Stop reason: SURG

## 2019-10-01 RX ORDER — ROCURONIUM BROMIDE 10 MG/ML
INJECTION, SOLUTION INTRAVENOUS PRN
Status: DISCONTINUED | OUTPATIENT
Start: 2019-10-01 | End: 2019-10-01 | Stop reason: SURG

## 2019-10-01 RX ORDER — LIDOCAINE HYDROCHLORIDE AND EPINEPHRINE 10; 10 MG/ML; UG/ML
INJECTION, SOLUTION INFILTRATION; PERINEURAL
Status: DISCONTINUED | OUTPATIENT
Start: 2019-10-01 | End: 2019-10-01 | Stop reason: HOSPADM

## 2019-10-01 RX ORDER — LIDOCAINE HYDROCHLORIDE 20 MG/ML
INJECTION, SOLUTION EPIDURAL; INFILTRATION; INTRACAUDAL; PERINEURAL PRN
Status: DISCONTINUED | OUTPATIENT
Start: 2019-10-01 | End: 2019-10-01 | Stop reason: SURG

## 2019-10-01 RX ORDER — ONDANSETRON 2 MG/ML
INJECTION INTRAMUSCULAR; INTRAVENOUS PRN
Status: DISCONTINUED | OUTPATIENT
Start: 2019-10-01 | End: 2019-10-01 | Stop reason: SURG

## 2019-10-01 RX ORDER — ROPIVACAINE HYDROCHLORIDE 5 MG/ML
INJECTION, SOLUTION EPIDURAL; INFILTRATION; PERINEURAL
Status: COMPLETED | OUTPATIENT
Start: 2019-10-01 | End: 2019-10-01

## 2019-10-01 RX ORDER — OXYCODONE HCL 5 MG/5 ML
5 SOLUTION, ORAL ORAL
Status: DISCONTINUED | OUTPATIENT
Start: 2019-10-01 | End: 2019-10-01 | Stop reason: HOSPADM

## 2019-10-01 RX ORDER — MIDAZOLAM HYDROCHLORIDE 1 MG/ML
INJECTION INTRAMUSCULAR; INTRAVENOUS PRN
Status: DISCONTINUED | OUTPATIENT
Start: 2019-10-01 | End: 2019-10-01 | Stop reason: SURG

## 2019-10-01 RX ORDER — ROPIVACAINE HYDROCHLORIDE 5 MG/ML
INJECTION, SOLUTION EPIDURAL; INFILTRATION; PERINEURAL PRN
Status: DISCONTINUED | OUTPATIENT
Start: 2019-10-01 | End: 2019-10-01 | Stop reason: SURG

## 2019-10-01 RX ORDER — MEPERIDINE HYDROCHLORIDE 25 MG/ML
25 INJECTION INTRAMUSCULAR; INTRAVENOUS; SUBCUTANEOUS
Status: DISCONTINUED | OUTPATIENT
Start: 2019-10-01 | End: 2019-10-01 | Stop reason: HOSPADM

## 2019-10-01 RX ORDER — BUPIVACAINE HYDROCHLORIDE 5 MG/ML
INJECTION, SOLUTION PERINEURAL
Status: DISCONTINUED | OUTPATIENT
Start: 2019-10-01 | End: 2019-10-01 | Stop reason: HOSPADM

## 2019-10-01 RX ORDER — LIDOCAINE HYDROCHLORIDE 40 MG/ML
SOLUTION TOPICAL PRN
Status: DISCONTINUED | OUTPATIENT
Start: 2019-10-01 | End: 2019-10-01 | Stop reason: SURG

## 2019-10-01 RX ORDER — OXYCODONE HCL 5 MG/5 ML
10 SOLUTION, ORAL ORAL
Status: DISCONTINUED | OUTPATIENT
Start: 2019-10-01 | End: 2019-10-01 | Stop reason: HOSPADM

## 2019-10-01 RX ORDER — SODIUM CHLORIDE, SODIUM LACTATE, POTASSIUM CHLORIDE, CALCIUM CHLORIDE 600; 310; 30; 20 MG/100ML; MG/100ML; MG/100ML; MG/100ML
INJECTION, SOLUTION INTRAVENOUS CONTINUOUS
Status: DISCONTINUED | OUTPATIENT
Start: 2019-10-01 | End: 2019-10-01 | Stop reason: HOSPADM

## 2019-10-01 RX ORDER — HYDROMORPHONE HYDROCHLORIDE 1 MG/ML
0.2 INJECTION, SOLUTION INTRAMUSCULAR; INTRAVENOUS; SUBCUTANEOUS
Status: DISCONTINUED | OUTPATIENT
Start: 2019-10-01 | End: 2019-10-01 | Stop reason: HOSPADM

## 2019-10-01 RX ORDER — MIDAZOLAM HYDROCHLORIDE 1 MG/ML
0.5 INJECTION INTRAMUSCULAR; INTRAVENOUS
Status: DISCONTINUED | OUTPATIENT
Start: 2019-10-01 | End: 2019-10-01 | Stop reason: HOSPADM

## 2019-10-01 RX ORDER — CEFAZOLIN SODIUM 1 G/3ML
INJECTION, POWDER, FOR SOLUTION INTRAMUSCULAR; INTRAVENOUS PRN
Status: DISCONTINUED | OUTPATIENT
Start: 2019-10-01 | End: 2019-10-01 | Stop reason: SURG

## 2019-10-01 RX ORDER — HYDROMORPHONE HYDROCHLORIDE 1 MG/ML
0.1 INJECTION, SOLUTION INTRAMUSCULAR; INTRAVENOUS; SUBCUTANEOUS
Status: DISCONTINUED | OUTPATIENT
Start: 2019-10-01 | End: 2019-10-01 | Stop reason: HOSPADM

## 2019-10-01 RX ORDER — HALOPERIDOL 5 MG/ML
1 INJECTION INTRAMUSCULAR
Status: DISCONTINUED | OUTPATIENT
Start: 2019-10-01 | End: 2019-10-01 | Stop reason: HOSPADM

## 2019-10-01 RX ORDER — HYDROMORPHONE HYDROCHLORIDE 1 MG/ML
0.4 INJECTION, SOLUTION INTRAMUSCULAR; INTRAVENOUS; SUBCUTANEOUS
Status: DISCONTINUED | OUTPATIENT
Start: 2019-10-01 | End: 2019-10-01 | Stop reason: HOSPADM

## 2019-10-01 RX ORDER — ONDANSETRON 2 MG/ML
4 INJECTION INTRAMUSCULAR; INTRAVENOUS
Status: DISCONTINUED | OUTPATIENT
Start: 2019-10-01 | End: 2019-10-01 | Stop reason: HOSPADM

## 2019-10-01 RX ADMIN — ONDANSETRON 4 MG: 2 INJECTION INTRAMUSCULAR; INTRAVENOUS at 09:44

## 2019-10-01 RX ADMIN — MIDAZOLAM HYDROCHLORIDE 2 MG: 1 INJECTION, SOLUTION INTRAMUSCULAR; INTRAVENOUS at 09:20

## 2019-10-01 RX ADMIN — CEFAZOLIN 2 G: 1 INJECTION, POWDER, FOR SOLUTION INTRAVENOUS at 09:44

## 2019-10-01 RX ADMIN — LIDOCAINE HYDROCHLORIDE 50 MG: 20 INJECTION, SOLUTION EPIDURAL; INFILTRATION; INTRACAUDAL; PERINEURAL at 09:33

## 2019-10-01 RX ADMIN — DEXAMETHASONE SODIUM PHOSPHATE 8 MG: 4 INJECTION, SOLUTION INTRAMUSCULAR; INTRAVENOUS at 09:44

## 2019-10-01 RX ADMIN — ROPIVACAINE HYDROCHLORIDE 30 ML: 5 INJECTION, SOLUTION EPIDURAL; INFILTRATION; PERINEURAL at 09:22

## 2019-10-01 RX ADMIN — SODIUM CHLORIDE, POTASSIUM CHLORIDE, SODIUM LACTATE AND CALCIUM CHLORIDE: 600; 310; 30; 20 INJECTION, SOLUTION INTRAVENOUS at 08:47

## 2019-10-01 RX ADMIN — SUCCINYLCHOLINE CHLORIDE 80 MG: 20 INJECTION, SOLUTION INTRAMUSCULAR; INTRAVENOUS at 09:33

## 2019-10-01 RX ADMIN — ROCURONIUM BROMIDE 10 MG: 10 INJECTION, SOLUTION INTRAVENOUS at 09:33

## 2019-10-01 RX ADMIN — CLINDAMYCIN PHOSPHATE 900 MG: 150 INJECTION, SOLUTION INTRAMUSCULAR; INTRAVENOUS at 09:44

## 2019-10-01 RX ADMIN — EPHEDRINE SULFATE 25 MG: 50 INJECTION INTRAMUSCULAR; INTRAVENOUS; SUBCUTANEOUS at 09:55

## 2019-10-01 RX ADMIN — FENTANYL CITRATE 50 MCG: 50 INJECTION, SOLUTION INTRAMUSCULAR; INTRAVENOUS at 09:22

## 2019-10-01 RX ADMIN — LIDOCAINE HYDROCHLORIDE 0.5 ML: 10 INJECTION, SOLUTION INFILTRATION; PERINEURAL at 08:50

## 2019-10-01 RX ADMIN — PROPOFOL 150 MG: 10 INJECTION, EMULSION INTRAVENOUS at 09:33

## 2019-10-01 RX ADMIN — LIDOCAINE HYDROCHLORIDE 4 ML: 40 SOLUTION TOPICAL at 09:34

## 2019-10-01 RX ADMIN — ROPIVACAINE HYDROCHLORIDE 30 ML: 5 INJECTION, SOLUTION EPIDURAL; INFILTRATION; PERINEURAL at 09:30

## 2019-10-01 RX ADMIN — FENTANYL CITRATE 50 MCG: 50 INJECTION, SOLUTION INTRAMUSCULAR; INTRAVENOUS at 09:33

## 2019-10-01 ASSESSMENT — PAIN SCALES - GENERAL: PAIN_LEVEL: 0

## 2019-10-01 NOTE — OP REPORT
DATE OF SERVICE:  10/01/2019    SURGEON:  Lavell Hooks MD    ASSISTANT:  Ely Avila PA-C    ANESTHESIOLOGIST:  Colin Strickland MD    PREOPERATIVE DIAGNOSES:  1.  Left shoulder full-thickness traumatic rotator cuff tear.  2.  Left shoulder subacromial impingement/bursitis.  3.  Left shoulder biceps tenosynovitis and tearing.  4.  Left shoulder synovitis.  5.  Left shoulder labrum/SLAP tear.    POSTOPERATIVE DIAGNOSES:  1.  Left shoulder full-thickness traumatic rotator cuff tear.  2.  Left shoulder subacromial impingement/bursitis.  3.  Left shoulder biceps tenosynovitis and tearing.  4.  Left shoulder synovitis.  5.  Left shoulder labrum/SLAP tear.    PROCEDURES PERFORMED:  1.  Left shoulder arthroscopy.  2.  Left shoulder synovectomy.  3.  Left shoulder labral debridement/extensive debridement.  4.  Left shoulder biceps tenotomy and open subpectoral tenodesis.  5.  Left shoulder subacromial decompression.  6.  Left shoulder rotator cuff repair.    ANESTHESIA:  General anesthesia with single shot interscalene block.    IMPLANTS:    1.  Smith and Nephew 2.8 mm Q-Fix anchor.  2.  Smith and Nephew 5.5 mm triple-loaded Healicoil anchor x2.    HISTORY OF PRESENT ILLNESS:  Patient is a very pleasant 64-year-old female who   experienced injury to her left shoulder back in January.  She was diagnosed   with a full-thickness traumatic rotator cuff tear.  We discussed surgical   intervention.  Patient has been n.p.o. since midnight.  She is medically   cleared for surgery by the anesthesia team.    INFORMED CONSENT:  Patient was informed of the risks, benefits, and   alternatives of planned operation.  The risks include but not limited to   bleeding, infection, neurovascular damage, osteoarthritis/cartilage damage,   pain, stiffness, recurrent rotator cuff tear, DVT, PE, MI, stroke, and death.    Advanced directives were reviewed.  After answering all questions, the   patient elected to proceed with  planned operation and informed consent form   was signed.    DESCRIPTION OF PROCEDURE:  Patient was identified in the preoperative holding   area.  The correct procedural side and site were identified and marked.  The   patient was then brought to the operating room and transferred to the   operating room table.  She received a single shot interscalene block by the   anesthesia team followed by general anesthesia.    The patient was then placed in the beach chair position with all bony   prominences well padded.  The left shoulder was cleaned with several   alcohol-soaked gauzes and subacromial space injected with 30 mL of 1%   lidocaine with epinephrine.  The left upper extremity was then prepped and   draped in normal standard sterile fashion.  A procedural pause was then   performed by the operating room team.  The procedure, the patient's identity,   the operative side and surgical site were all verified.  The patient was given   IV antibiotics prior to incision.    I then began with a diagnostic arthroscopy via standard posterior and anterior   portals.  There was extensive synovitis of the glenohumeral joint.  There was   tearing of the anterior, superior and posterior labrum with extensive tearing   of the long head of the biceps tendon within the joint as well.  No obvious   loose bodies or soft debris within inferior axillary recess.  Just some   intermittent grade II changes over the glenoid and humeral head.  The   subscapularis appeared to be intact at its insertion on the lesser tuberosity.    Examination of the articular side of the rotator cuff demonstrated a large   full-thickness tear of the supraspinatus and anterior fibers of the   infraspinatus.  Examination of the subacromial space demonstrated extensive   inflammation and synovitis of the subacromial bursa.  There was a very   prominent anterolateral acromial spur.  Examination of the bursal side of the   rotator cuff demonstrated a  full-thickness tear of the supraspinatus and   superior infraspinatus tendons.  Had an L type shape with retraction of the   medial limb to the level of the glenoid.    I first performed an extensive synovectomy of the glenohumeral joint.  I then   performed a debridement of the posterior, superior and anterior labrum.    Biceps tenotomy was then performed at the insertion on to the superior labrum.    The tendon was allowed to retract distally out of the joint.  The remaining   stump of tissue was then debrided.    All instruments were then removed.  A 2.5 cm longitudinal incision was made   over the anterior part of the upper arm.  Dissection was carried down, the   underlying fascia was incised.  The long head of the biceps tendon was   identified and pulled out of the wound.  The proximal tendon was excised,   leaving about 1.5 cm of tendon intact.  A single 2.8 mm Smith and Nephew Q-Fix   anchor was then placed onto the anterior part of the proximal humerus within   the distal part of the bicipital groove, immediately posterior to the inferior   border of the pectoralis major tendon.  The biceps was then secured resulting   in excellent fixation and recreation of normal length-tension relationship.    Wound was then copiously irrigated and meticulous hemostasis obtained.  All   gloves were then changed.    The camera and trocar were then placed back into the posterior portal into the   subacromial space.  I then created an accessory portal of the lateral part of   the acromion under direct visualization.  A full bursectomy was then   performed.  The undersurface of the acromion was identified and anterolateral   acromioplasty was performed, utilizing a cutting block technique.  I then   turned my attention back to the rotator cuff.  I first debrided the tuberosity   of all remaining soft tissue, then created a more anterior lateral portal and   placed an 8.25 mm cannula here.  I then used the arthroscopic  resector to   debride the tuberosity bone in hopes of improving biological healing.  I then   placed two #2 Cobraid [id number] sutures to close the medial limb of the   rotator cuff tear, utilizing a margin convergence technique.  I then   percutaneously placed 2 Smith and Nephew 5.5 mm triple-loaded Healicoil   anchors onto the medial part of the tuberosity, immediately adjacent to the   articular cartilage.  All 6 sutures were then passed through the rotator cuff   just lateral to the myotendinous junction.  I punched additional holes in the   lateral tuberosity with the use of the microfracture awl in hopes of improving   biological healing, utilizing a Crimson Duvet technique.  The sutures were   then tied resulting in excellent fixation of the rotator cuff back to its   footprint on the tuberosity.  The oscillating suction shaver device was then   placed back into the joint and subacromial space to remove any soft tissue or   bony debris.  Meticulous hemostasis obtained.  All instruments were then   removed.    The incisions were then copiously irrigated.  The portals were closed with   simple 3-0 Prolene suture followed by Steri-Strips and Xeroform.  The   tenodesed wound was then closed in a layered fashion with Monocryl followed by   Steri-Strips and Xeroform.  A sterile compressive dressing was then applied   followed by a well-padded shoulder abduction sling.    Needle and sponge counts were correct at the end of the procedure as reported   by the circulating nurse.   The patient tolerated the procedure well without   complications.  The patient was then transferred off the operating room table   on to the regular hospital bed.  She was extubated by the anesthesia team.    ESTIMATED BLOOD LOSS:  10 mL.    COMPLICATIONS:  None.    TOURNIQUET TIME:  None.    SPECIMENS:  None.    WOUND TYPE:  Type 1 clean.    POSTOPERATIVE PLAN:  The patient will be transferred back to the postoperative   unit.  I expect  she will be discharged from the hospital later this afternoon   once mobilizing safely and tolerating all medications.  She will remain   nonweightbearing to the left upper extremity for the next 6 weeks.  We will   begin some physical therapy at that time.       ____________________________________     MD BEATRIZ Hicks / MARCO A    DD:  10/01/2019 11:05:31  DT:  10/01/2019 12:39:52    D#:  [id number]  Job#:  [id number]

## 2019-10-01 NOTE — ANESTHESIA PROCEDURE NOTES
Airway  Date/Time: 10/1/2019 9:34 AM  Performed by: Colin Strickland M.D.  Authorized by: Colin Strickland M.D.     Location:  OR  Urgency:  Elective  Indications for Airway Management:  Anesthesia  Spontaneous Ventilation: absent    Sedation Level:  Deep  Preoxygenated: Yes    Patient Position:  Sniffing  Final Airway Type:  Endotracheal airway  Final Endotracheal Airway:  ETT  Cuffed: Yes    Technique Used for Successful ETT Placement:  Direct laryngoscopy  Insertion Site:  Oral  Blade Type:  Fernando  Laryngoscope Blade/Videolaryngoscope Blade Size:  3  ETT Size (mm):  6.5  Measured from:  Teeth  ETT to Teeth (cm):  21  Placement Verified by: auscultation and capnometry    Cormack-Lehane Classification:  Grade IIa - partial view of glottis  Number of Attempts at Approach:  1  Number of Other Approaches Attempted:  0

## 2019-10-01 NOTE — ANESTHESIA POSTPROCEDURE EVALUATION
Patient: Lizy Merlos    Procedure Summary     Date:  10/01/19 Room / Location:   OR  / SURGERY [facility]    Anesthesia Start:  0920 Anesthesia Stop:  1118    Procedures:       ARTHROSCOPY, SHOULDER, WITH ROTATOR CUFF REPAIR (Left Shoulder)      DECOMPRESSION, SHOULDER, ARTHROSCOPIC - SUBACROMIAL W/LABRAL DEBRIDEMENT (Left Shoulder)      ARTHROSCOPY, SHOULDER, WITH BICEPS TENODESIS - W/OPEN SUBPECTORAL TENODESIS, PROCEED AS INDICATED (Left Shoulder) Diagnosis:  (BICEPS TENDINITIS LEFT, COMPLETE ROTATOR CUFF TEAR OR RUPTURE WITH SHOULDER PAIN)    Surgeon:  Lavell Hooks M.D. Responsible Provider:  Colin Strickland M.D.    Anesthesia Type:  general, peripheral nerve block ASA Status:  2          Final Anesthesia Type: general, peripheral nerve block  Last vitals  BP   Blood Pressure: 131/84    Temp   36.7 °C (98.1 °F)    Pulse   Pulse: 60   Resp   16    SpO2   97 %      Anesthesia Post Evaluation    Patient location during evaluation: PACU  Patient participation: complete - patient participated  Level of consciousness: awake and alert  Pain score: 0    Airway patency: patent  Anesthetic complications: no  Cardiovascular status: hemodynamically stable  Respiratory status: acceptable  Hydration status: euvolemic    PONV: none           Nurse Pain Score: 6 (NPRS)

## 2019-10-01 NOTE — ANESTHESIA TIME REPORT
Anesthesia Start and Stop Event Times     Date Time Event    10/1/2019 0902 Ready for Procedure     0920 Anesthesia Start     1118 Anesthesia Stop        Responsible Staff  10/01/19    Name Role Begin End    Colin Strickland M.D. Anesth 0920 1118        Preop Diagnosis (Free Text):  Pre-op Diagnosis     BICEPS TENDINITIS LEFT, COMPLETE ROTATOR CUFF TEAR OR RUPTURE WITH SHOULDER PAIN        Preop Diagnosis (Codes):    Post op Diagnosis  Left shoulder pain      Premium Reason  Non-Premium    Comments:

## 2019-10-01 NOTE — DISCHARGE INSTRUCTIONS
ACTIVITY: Rest and take it easy for the first 24 hours.  A responsible adult is recommended to remain with you during that time.  It is normal to feel sleepy.  We encourage you to not do anything that requires balance, judgment or coordination.    MILD FLU-LIKE SYMPTOMS ARE NORMAL. YOU MAY EXPERIENCE GENERALIZED MUSCLE ACHES, THROAT IRRITATION, HEADACHE AND/OR SOME NAUSEA.    FOR 24 HOURS DO NOT:  Drive, operate machinery or run household appliances.  Drink beer or alcoholic beverages.   Make important decisions or sign legal documents.    SPECIAL INSTRUCTIONS: PLease refer to Dr. Hooks Discharge Instructions, Attached.    DIET: To avoid nausea, slowly advance diet as tolerated, avoiding spicy or greasy foods for the first day.  Add more substantial food to your diet according to your physician's instructions.  Babies can be fed formula or breast milk as soon as they are hungry.  INCREASE FLUIDS AND FIBER TO AVOID CONSTIPATION.    FOLLOW-UP APPOINTMENT:  A follow-up appointment should be arranged with your doctor ; call to schedule.    You should CALL YOUR PHYSICIAN if you develop:  Fever greater than 101 degrees F.  Pain not relieved by medication, or persistent nausea or vomiting.  Excessive bleeding (blood soaking through dressing) or unexpected drainage from the wound.  Extreme redness or swelling around the incision site, drainage of pus or foul smelling drainage.  Inability to urinate or empty your bladder within 8 hours.  Problems with breathing or chest pain.    You should call 911 if you develop problems with breathing or chest pain.  If you are unable to contact your doctor or surgical center, you should go to the nearest emergency room or urgent care center.  Physician's telephone #: [phone]    If any questions arise, call your doctor.  If your doctor is not available, please feel free to call the Surgical Center at {Surgical Dept Numbers:[id number]}.  The Center is open Monday through Friday from  7AM to 7PM.  You can also call the HEALTH HOTLINE open 24 hours/day, 7 days/week and speak to a nurse at [phone], or toll free at [phone].    A registered nurse may call you a few days after your surgery to see how you are doing after your procedure.    MEDICATIONS: Resume taking daily medication.  Take prescribed pain medication with food.  If no medication is prescribed, you may take non-aspirin pain medication if needed.  PAIN MEDICATION CAN BE VERY CONSTIPATING.  Take a stool softener or laxative such as senokot, pericolace, or milk of magnesia if needed.    Prescription given prior to surgery.   Pain medication was not given during recovery, may take oral pain medications when needed, as prescribed.    If your physician has prescribed pain medication that includes Acetaminophen (Tylenol), do not take additional Acetaminophen (Tylenol) while taking the prescribed medication.    Depression / Suicide Risk    As you are discharged from this [facility] facility, it is important to learn how to keep safe from harming yourself.    Recognize the warning signs:  · Abrupt changes in personality, positive or negative- including increase in energy   · Giving away possessions  · Change in eating patterns- significant weight changes-  positive or negative  · Change in sleeping patterns- unable to sleep or sleeping all the time   · Unwillingness or inability to communicate  · Depression  · Unusual sadness, discouragement and loneliness  · Talk of wanting to die  · Neglect of personal appearance   · Rebelliousness- reckless behavior  · Withdrawal from people/activities they love  · Confusion- inability to concentrate     If you or a loved one observes any of these behaviors or has concerns about self-harm, here's what you can do:  · Talk about it- your feelings and reasons for harming yourself  · Remove any means that you might use to hurt yourself (examples: pills, rope, extension cords, firearm)  · Get  "professional help from the community (Mental Health, Substance Abuse, psychological counseling)  · Do not be alone:Call your Safe Contact- someone whom you trust who will be there for you.  · Call your local CRISIS HOTLINE 454-6790 or 897-279-4168  · Call your local Children's Mobile Crisis Response Team [place] (538) 532-4417 or www.IOCS  · Call the toll free National Suicide Prevention Hotlines   · National Suicide Prevention Lifeline 872-402-VWIV (3353)  [employer] [facility] 800-SUICIDE (790-7457)    Peripheral Nerve Block Discharge Instructions from Same Day Surgery and Inpatient :    What to Expect - Upper Extremity  · You may experience numbness and weakness in {ARM LOCATION PNB:234039}  on the same side as your surgery  · This is normal. For some people, this may be an unpleasant sensation. Be very careful with your numb limb  · Ask for help when you need it  Shoulder Surgery Side Effects  · In addition to numbness and weakness you may experience other symptoms  · Other nerves that are close to those nerves injected can also be affected by local anesthesia  · You may experience a hoarseness in your voice  · Your breathing may feel different  · You may also notice drooping of your eyelid, pupil constriction, and decreased sweating, on the side of your surgery  · All of these side effects are normal and will resolve when the local anesthetic wears off   Prevent Injury  · Protect the limb like a baby  · Beware of exposing your limb to extreme heat or cold or trauma  · The limb may be injured without you noticing because it is numb  · Keep the limb elevated whenever possible  · Do not sleep on the limb  · Change the position of the limb regularly  · Avoid putting pressure on your surgical limb  Pain Control  · The initial block on the day of surgery will make your extremity feel \"numb\"  · Any consecutive injection including prior to discharge from the hospital will make your extremity feel " "\"numb\"  · You may feel an aching or burning when the local anesthesia starts to wear off  · Take pain pills as prescribed by your surgeon  · Call your surgeon or anesthesiologist if you do not have adequate pain control  ·   "

## 2019-10-01 NOTE — OR NURSING
Patient to preop, allergies and NPO status verified, home medications reconciled, belongings secured, verbalizes understanding of pain scale, surgical site verified, IV access established, SCDs/ NIRANJAN hose in place, triple aim completed.

## 2019-10-01 NOTE — ANESTHESIA PROCEDURE NOTES
Peripheral Block  Date/Time: 10/1/2019 9:22 AM  Performed by: Colin Strickland M.D.  Authorized by: Colin Strickland M.D.     Patient Location:  OR  Start Time:  10/1/2019 9:22 AM  End Time:  10/1/2019 9:30 AM  Reason for Block: at surgeon's request and post-op pain management    patient identified, IV checked, site marked, risks and benefits discussed, surgical consent, monitors and equipment checked, pre-op evaluation and timeout performed    Patient Position:  Supine  Prep: ChloraPrep    Monitoring:  Heart rate, continuous pulse ox and cardiac monitor  Block Region:  Upper Extremity  Upper Extremity - Block Type:  BRACHIAL PLEXUS block, Interscalene approach    Laterality:  Left  Procedures: ultrasound guided  Image captured, interpreted and electronically stored.  Local Infiltration:  Lidocaine  Strength:  1 %  Dose:  3 ml  Block Type:  Single-shot  Needle Length:  100mm  Needle Gauge:  21 G  Needle Localization:  Ultrasound guidance  Injection Assessment:  Negative aspiration for heme, no paresthesia on injection, incremental injection and local visualized surrounding nerve on ultrasound  Evidence of intravascular injection: No

## 2019-10-01 NOTE — OR NURSING
1116-arrived recovery pt opens eyes to verbal stim. Nods head appropriatly. Wiggles finger to command. +CMS to L hand. +radial pulse LUE. dsg CDI ice pack and immobilizer in place.  VSS. O2 @2LNC.  1140-pt awake, tolerates po ice chips. Vss. Denies pain or nausea. dsg CDI.  1210- pt resting quietly denies pain or nausea vss.   1230-meets criteria stage 2. Report to Soto CABALLERO.

## 2019-11-07 ENCOUNTER — OFFICE VISIT (OUTPATIENT)
Dept: MEDICAL GROUP | Age: 64
End: 2019-11-07
Payer: COMMERCIAL

## 2019-11-07 VITALS
TEMPERATURE: 98.1 F | SYSTOLIC BLOOD PRESSURE: 98 MMHG | HEIGHT: 63 IN | BODY MASS INDEX: 26.47 KG/M2 | DIASTOLIC BLOOD PRESSURE: 58 MMHG | WEIGHT: 149.4 LBS | OXYGEN SATURATION: 95 % | HEART RATE: 80 BPM

## 2019-11-07 DIAGNOSIS — E78.00 HYPERCHOLESTEROLEMIA: ICD-10-CM

## 2019-11-07 DIAGNOSIS — G89.29 CHRONIC BILATERAL LOW BACK PAIN WITH BILATERAL SCIATICA: ICD-10-CM

## 2019-11-07 DIAGNOSIS — F33.1 MODERATE EPISODE OF RECURRENT MAJOR DEPRESSIVE DISORDER (HCC): ICD-10-CM

## 2019-11-07 DIAGNOSIS — F41.9 ANXIETY: ICD-10-CM

## 2019-11-07 DIAGNOSIS — Z12.31 VISIT FOR SCREENING MAMMOGRAM: ICD-10-CM

## 2019-11-07 DIAGNOSIS — M54.42 CHRONIC BILATERAL LOW BACK PAIN WITH BILATERAL SCIATICA: ICD-10-CM

## 2019-11-07 DIAGNOSIS — M54.41 CHRONIC BILATERAL LOW BACK PAIN WITH BILATERAL SCIATICA: ICD-10-CM

## 2019-11-07 PROCEDURE — 99214 OFFICE O/P EST MOD 30 MIN: CPT | Performed by: INTERNAL MEDICINE

## 2019-11-07 ASSESSMENT — PAIN SCALES - GENERAL: PAINLEVEL: NO PAIN

## 2019-11-07 NOTE — PROGRESS NOTES
Subjective:   Lizy Merlos is a 64 y.o. female here today for evaluation and management of:     Hypercholesterolemia  The patient has a chronic history of hypercholesterolemia. Currently taking Simvastatin 40 mg once daily but is not taking a daily Aspirin. No medication side effects were reported including myalgias or abdominal pain. No acute complaints of dizziness, claudication or chest pain.     Results for LIZY MATTHEWS (MRN [id number]) as of 11/7/2019 08:57   Ref. Range 9/20/2019 09:07   Cholesterol,Tot Latest Ref Range: 100 - 199 mg/dL 156   Triglycerides Latest Ref Range: 0 - 149 mg/dL 85   HDL Latest Ref Range: >=40 mg/dL 77   LDL Latest Ref Range: <100 mg/dL 62       Moderate episode of recurrent major depressive disorder (HCC)  Anxiety  The patient has a chronic history of depression and anxiety. Currently, she is taking Lexapro 20 mg once daily and Mirtazapine 7.5 mg once daily. She denies any medication side effects and feels that her symptoms are well controlled. She was previously referred to psychiatry but has been unable to establish care. Negative for suicidal ideations or plan.    Chronic bilateral low back pain with bilateral sciatica  The patient has a chronic history of bilateral low back pain which is associated with sciatica. She is followed by [facility] who has administered lower back injections. She reports mild relief with these injections. Additionally, she is now taking Topamax 25 mg which is helping to alleviate her pain.  She received Topamax from [facility].  Previously, she was on Gabapentin which she discontinued secondary to medication side effects. Negative for significant changes in the severity of her pain, falls, injury, groin numbness, bowel or bladder incontinence.     Current medicines (including changes today)  Current Outpatient Medications   Medication Sig Dispense Refill   • topiramate (TOPAMAX) 25 MG Tab Take 25 mg by mouth as needed.     • Omega-3  "Fatty Acids (FISH OIL) 1000 MG Cap capsule Take 1,000 mg by mouth every day.     • therapeutic multivitamin-minerals (THERAGRAN-M) Tab Take 1 Tab by mouth every day.     • cholecalciferol (D-3-5) 5000 UNIT Cap Take 5,000 Units by mouth every day.     • Calcium Acetate, Phos Binder, (CALCIUM ACETATE PO) Take 6,700 mg by mouth.     • Multiple Vitamins-Minerals (EMERGEN-C IMMUNE PO) Take  by mouth.     • TURMERIC CURCUMIN PO Take  by mouth.     • LECITHIN PO Take  by mouth.     • aspirin EC (ECOTRIN) 81 MG Tablet Delayed Response Take 81 mg by mouth every day.     • Cyanocobalamin (B-12 PO) Take  by mouth.     • Naproxen Sodium (ALEVE) 220 MG Cap Take  by mouth.     • acetaminophen (TYLENOL) 500 MG Tab Take 500-1,000 mg by mouth every 6 hours as needed.     • Non Formulary Request Indications: immune drops (clove, cinnamon, lemon, orange, oregano, mandarin, thyme, rosemary, nutmeg, ginger, eucalyptus, citriodora) and lemon and peppermint oils     • mirtazapine (REMERON) 7.5 MG tablet Take 1 Tab by mouth every bedtime. 90 Tab 3   • escitalopram (LEXAPRO) 20 MG tablet Take 1 Tab by mouth every day. 90 Tab 3   • simvastatin (ZOCOR) 40 MG Tab Take 1 Tab by mouth every evening. 90 Tab 3     No current facility-administered medications for this visit.      She  has a past medical history of Anxiety disorder (09/2019), Arthritis (09/2019), Depression (09/2019), High cholesterol, and Snoring.    ROS   No chest pain, shortness of breath or abdominal pain. See HPI for further details.       Objective:     BP (!) 98/58 (BP Location: Left arm, Patient Position: Sitting, BP Cuff Size: Adult)   Pulse 80   Temp 36.7 °C (98.1 °F) (Temporal)   Ht 1.612 m (5' 3.47\")   Wt 67.8 kg (149 lb 6.4 oz)   SpO2 95%  Body mass index is 26.08 kg/m².   Physical Exam:  General: Alert, oriented and no acute distress.  Eye contact is good, speech goal directed, affect calm  HEENT: conjunctiva non-injected, sclera non-icteric.  Oral mucous " membranes pink and moist with no lesions.  Pinna normal.   Lungs: Normal respiratory effort, clear to auscultation bilaterally with good excursion.  CV: regular rate and rhythm. No murmurs.   Abdomen: soft, non distended, nontender, Bowel sound normal.  Ext: no edema, color normal, vascularity normal, temperature normal        Assessment and Plan:   The following treatment plan was discussed     1. Hypercholesterolemia  Well-controlled. Continue current regimen of Simvastatin 40 mg once daily. Reviewed the risks and benefits of treatment and potential side effects of medication.  - Obtained and reviewed lipid panel dated 09/20/19 which is within normal limits. Liver enzymes normal.  - Recommended they follow low fat, low carbohydrate and high fiber diet. Additionally, patient was asked to exercise regularly including frequent cardio.  - Recheck lab 1-2 weeks before next follow up visit.    2. Moderate episode of recurrent major depressive disorder (HCC)  3. Anxiety  Chronic, stable history. Plan to continue current medication regimen of Lexapro 20 mg once daily and Mirtazapine 7.5 mg once daily. She was encouraged to follow up with scheduling for establishing care with psychiatry as previously referred. No suicidal ideations or plan. She was asked to seek medical care for suicidal ideations and verbalized her understanding.    4. Chronic bilateral low back pain with bilateral sciatica  Chronic, stable history. Followed by [facility] for cortisone steroid injections and lower back pain treatment. No concern for spinal compression at this time. Plan to continue Topamax as prescribed by Joes Galvan for pain. Gabapentin was discontinued.  Encourage walking exercise gentle, stretching exercise or swimming exercise regularly.    5. Visit for screening mammogram  Patient is due for a mammogram. No personal history of breast cancer.  Counseled for breast cancer screening.  Patient agreed to do mammogram and the order was  placed today.  - MA-SCREEN MAMMO W/CAD-BILAT; Future     6. Health Maintenance  Colonoscopy is up to date.  Will request colonoscopy report from [place].  Plan to discuss completing a pap smear upon establishing with her new primary care provider.  Patient declined to receive Tdap vaccine today even after discussion of risks and benefits of Tdap vaccine.    Followup: Return in about 3 months (around 2/7/2020), or if symptoms worsen or fail to improve, for Hyperlipidemia, Depression, Anxiety, lower back pain.      Please note that this dictation was created using voice recognition software. I have made every reasonable attempt to correct obvious errors, but I expect that there may have unintended errors in text, spelling, punctuation, or grammar that I did not discover.

## 2019-11-07 NOTE — LETTER
[facility]  Ban Covington M.D.  [address]  [place] [address]  Fax: [phone]   Authorization for Release/Disclosure of   Protected Health Information   Name: MANJINDER MAK : 1955 SSN: [id number]   Address: [address]  [place] [address] Phone:    [phone] (home)    I authorize the entity listed below to release/disclose the PHI below to:   [facility]/Ban Covington M.D. and Ban Covington M.D.   Provider or Entity Name:  [facility]  [address],   [place] [address] Phone:  [phone]    Fax:     Reason for request: continuity of care   Information to be released:    [XXX] LAST COLONOSCOPY,  including any PATH REPORT and follow-up  [  ] LAST FIT/COLOGUARD RESULT [  ] LAST DEXA  [  ] LAST MAMMOGRAM  [  ] LAST PAP  [  ] LAST LABS [  ] RETINA EXAM REPORT  [  ] IMMUNIZATION RECORDS  [XXX] Release all info      [  ] Check here and initial the line next to each item to release ALL health information INCLUDING  _____ Care and treatment for drug and / or alcohol abuse  _____ HIV testing, infection status, or AIDS  _____ Genetic Testing    DATES OF SERVICE OR TIME PERIOD TO BE DISCLOSED: _____________  I understand and acknowledge that:  * This Authorization may be revoked at any time by you in writing, except if your health information has already been used or disclosed.  * Your health information that will be used or disclosed as a result of you signing this authorization could be re-disclosed by the recipient. If this occurs, your re-disclosed health information may no longer be protected by State or Federal laws.  * You may refuse to sign this Authorization. Your refusal will not affect your ability to obtain treatment.  * This Authorization becomes effective upon signing and will  on (date) __________.      If no date is indicated, this Authorization will  one (1) year from the signature date.    Name:  Lizy Merlos    Signature:   Date:     11/7/2019       PLEASE FAX REQUESTED RECORDS BACK TO: [phone]

## 2019-11-11 NOTE — PROGRESS NOTES
"Consent for Release of Colonoscopy faxed to [place], [place]. Received back a fax transmittal correspondence message, stating \"unable to locate a colonoscopy\" for pt. Scanned to pt media.  "

## 2019-11-20 ENCOUNTER — SLEEP CENTER VISIT (OUTPATIENT)
Dept: SLEEP MEDICINE | Facility: MEDICAL CENTER | Age: 64
End: 2019-11-20
Payer: COMMERCIAL

## 2019-11-20 VITALS
RESPIRATION RATE: 16 BRPM | SYSTOLIC BLOOD PRESSURE: 134 MMHG | BODY MASS INDEX: 25.16 KG/M2 | OXYGEN SATURATION: 95 % | WEIGHT: 142 LBS | HEIGHT: 63 IN | HEART RATE: 84 BPM | DIASTOLIC BLOOD PRESSURE: 72 MMHG

## 2019-11-20 DIAGNOSIS — E78.00 HYPERCHOLESTEROLEMIA: ICD-10-CM

## 2019-11-20 DIAGNOSIS — F33.1 MODERATE EPISODE OF RECURRENT MAJOR DEPRESSIVE DISORDER (HCC): ICD-10-CM

## 2019-11-20 DIAGNOSIS — Z87.891 FORMER SMOKER: ICD-10-CM

## 2019-11-20 DIAGNOSIS — Z92.29 UP TO DATE WITH INFLUENZA VACCINATION: ICD-10-CM

## 2019-11-20 DIAGNOSIS — G47.33 OSA (OBSTRUCTIVE SLEEP APNEA): ICD-10-CM

## 2019-11-20 PROCEDURE — 99204 OFFICE O/P NEW MOD 45 MIN: CPT | Performed by: INTERNAL MEDICINE

## 2019-11-20 RX ORDER — ZOLPIDEM TARTRATE 5 MG/1
5 TABLET ORAL NIGHTLY PRN
Qty: 2 TAB | Refills: 0 | Status: SHIPPED | OUTPATIENT
Start: 2019-11-20 | End: 2019-12-20

## 2019-11-20 NOTE — PROGRESS NOTES
"CC: \"Sleep apnea\"    HPI:  Ms. Lizy Merlos is a 64-year-old  kindly referred by Dr. Ban Ortiz for evaluation of sleep apnea.  Symptoms have been present for 10 years or more.  She does not consider her problem to be serious.  She has a regular bed partner and estimates her sleep latency to be about 10 minutes.  She rarely wakes up at all at nighttime.  She generally goes to bed at 8-9 PM and gets up at 6-8 AM.  Symptoms include sleepiness during the day and tiredness during the day.  She is known to be a loud enough snorer to disturb her bed partner.  She has been  to her current  for approximately 6 months he has noticed episodes of apnea each and every night.    She has episodes of restless legs 2 or 3 times a week lasting 10 to 15 minutes and relieved by Aleve.  Father and son both have sleep apnea and uses CPAP.    Her total Mio score is a normal 10 out of 24.    Her  is noticed loud snoring, light snoring, pauses in breathing followed by gasping for air, resuscitative snorts each and every night since May 2019.    Review of her sleep diary shows no napping.  She awakened feeling \"good\" each and every morning.    Different comorbidities modifying factors include dyslipidemia, depression, anxiety, back pain, former smoker, near normal BMI, rotator cuff repair, former alcoholic, restless legs syndrome, insomnia, for which she takes Remeron, deviated septum repari, and up-to-date with influenza vaccination.      Patient Active Problem List    Diagnosis Date Noted   • PRAVEEN (obstructive sleep apnea) 11/20/2019   • Former smoker 11/20/2019   • Up to date with influenza vaccination 11/20/2019   • Moderate episode of recurrent major depressive disorder (HCC) 07/23/2019   • Hypercholesterolemia 07/23/2019   • Chronic bilateral low back pain with bilateral sciatica 07/23/2019   • Anxiety 07/23/2019   • Nontraumatic complete tear of left rotator cuff 07/23/2019       Past " Medical History:   Diagnosis Date   • Anxiety disorder 09/2019   • Arthritis 09/2019    hips, spine, hands   • Depression 09/2019   • High cholesterol    • Restless leg syndrome    • Snoring         Past Surgical History:   Procedure Laterality Date   • PB SHLDR ARTHROSCOP,SURG,W/ROTAT CUFF REPB Left 10/1/2019    Procedure: ARTHROSCOPY, SHOULDER, WITH ROTATOR CUFF REPAIR;  Surgeon: Lavell Hooks M.D.;  Location: SURGERY [facility];  Service: Orthopedics   • PB SHLDR ARTHROSCOP,PART ACROMIOPLAS Left 10/1/2019    Procedure: DECOMPRESSION, SHOULDER, ARTHROSCOPIC - SUBACROMIAL W/LABRAL DEBRIDEMENT;  Surgeon: Lavell Hooks M.D.;  Location: SURGERY [facility];  Service: Orthopedics   • PB ARTHROSCOPY SHOULDER SURGICAL BICEPS TENODES* Left 10/1/2019    Procedure: ARTHROSCOPY, SHOULDER, WITH BICEPS TENODESIS - W/OPEN SUBPECTORAL TENODESIS, PROCEED AS INDICATED;  Surgeon: Lavell Hooks M.D.;  Location: SURGERY [facility];  Service: Orthopedics   • CERVICAL DISK AND FUSION ANTERIOR  1993 and 1994   • PRIMARY C SECTION      twice    • SHOULDER SURGERY Left    • SINUSCOPE     • TONSILLECTOMY         Family History   Problem Relation Age of Onset   • Dementia Mother    • Heart Disease Mother         pacemaker   • Heart Disease Father         congestive heart failure   • Alcohol/Drug Paternal Uncle    • Dementia Maternal Grandmother    • Alcohol/Drug Maternal Grandfather    • Stroke Paternal Grandmother    • Heart Attack Paternal Grandfather        Social History     Socioeconomic History   • Marital status:      Spouse name: Not on file   • Number of children: Not on file   • Years of education: Not on file   • Highest education level: Not on file   Occupational History   • Not on file   Social Needs   • Financial resource strain: Not on file   • Food insecurity:     Worry: Not on file     Inability: Not on file   • Transportation needs:     Medical: Not on file      Non-medical: Not on file   Tobacco Use   • Smoking status: Former Smoker     Packs/day: 1.00     Years: 20.00     Pack years: 20.00     Types: Cigarettes     Last attempt to quit: 2009     Years since quitting: 10.8   • Smokeless tobacco: Never Used   Substance and Sexual Activity   • Alcohol use: No     Comment: former alcoholic, stopped 06/12/2016   • Drug use: No     Types: Marijuana, Cocaine   • Sexual activity: Yes     Partners: Male     Birth control/protection: Co[employer]     Comment: spouse   Lifestyle   • Physical activity:     Days per week: Not on file     Minutes per session: Not on file   • Stress: Not on file   Relationships   • Social connections:     Talks on phone: Not on file     Gets together: Not on file     Attends Zoroastrianism service: Not on file     Active member of club or organization: Not on file     Attends meetings of clubs or organizations: Not on file     Relationship status: Not on file   • Intimate partner violence:     Fear of current or ex partner: Not on file     Emotionally abused: Not on file     Physically abused: Not on file     Forced sexual activity: Not on file   Other Topics Concern   • Not on file   Social History Narrative   • Not on file       Current Outpatient Medications   Medication Sig Dispense Refill   • zolpidem (AMBIEN) 5 MG Tab Take 1 Tab by mouth at bedtime as needed for Sleep (1 to 2 po qhs prn insomnia/sleep study. Bring to sleep study.) for up to 2 doses. 2 Tab 0   • topiramate (TOPAMAX) 25 MG Tab Take 25 mg by mouth as needed.     • Omega-3 Fatty Acids (FISH OIL) 1000 MG Cap capsule Take 1,000 mg by mouth every day.     • therapeutic multivitamin-minerals (THERAGRAN-M) Tab Take 1 Tab by mouth every day.     • cholecalciferol (D-3-5) 5000 UNIT Cap Take 5,000 Units by mouth every day.     • Calcium Acetate, Phos Binder, (CALCIUM ACETATE PO) Take 6,700 mg by mouth.     • Multiple Vitamins-Minerals (EMERGEN-C IMMUNE PO) Take  by mouth.     • TURMERIC  "CURCUMIN PO Take  by mouth.     • LECITHIN PO Take  by mouth.     • aspirin EC (ECOTRIN) 81 MG Tablet Delayed Response Take 81 mg by mouth every day.     • Cyanocobalamin (B-12 PO) Take  by mouth.     • Naproxen Sodium (ALEVE) 220 MG Cap Take  by mouth.     • acetaminophen (TYLENOL) 500 MG Tab Take 500-1,000 mg by mouth every 6 hours as needed.     • Non Formulary Request Indications: immune drops (clove, cinnamon, lemon, orange, oregano, mandarin, thyme, rosemary, nutmeg, ginger, eucalyptus, citriodora) and lemon and peppermint oils     • mirtazapine (REMERON) 7.5 MG tablet Take 1 Tab by mouth every bedtime. 90 Tab 3   • escitalopram (LEXAPRO) 20 MG tablet Take 1 Tab by mouth every day. 90 Tab 3   • simvastatin (ZOCOR) 40 MG Tab Take 1 Tab by mouth every evening. 90 Tab 3     No current facility-administered medications for this visit.     \"CURRENT RX\"    ALLERGIES: Patient has no known allergies.    ROS    Constitutional: Denies fever, chills, sweats,  weight loss, fatigue.  Eyes: Denies vision loss, pain, drainage, double vision, glasses.  Ears/Nose/Mouth/Throat: Denies earache, difficulty hearing, rhinitis/nasal congestion, injury, recurrent sore throat, persistent hoarseness, decayed teeth/toothaches, ringing or buzzing in the ears.  Cardiovascular: Denies chest pain, tightness, palpitations, swelling in legs/feet, fainting, difficulty breathing when lying down but gets better when sitting up.   Respiratory: Positive for cough denies all other  Sleep: per HPI  Gastrointestinal: Denies  difficulty swallowing, nausea, abdominal pain, diarrhea, constipation, heartburn. + for dysphagia  Genitourinary: Denies  blood in urine, discharge, frequent urination.   Musculoskeletal: Positive for all the following painful joints, sore muscles, back pain.   Integumentary: Denies rashes, lumps, color changes.   Neurological: Denies frequent headaches,weakness, dizziness.    PHYSICAL EXAM  Normal weight    /72 (BP " "Location: Right arm, Patient Position: Sitting, BP Cuff Size: Adult)   Pulse 84   Resp 16   Ht 1.6 m (5' 3\")   Wt 64.4 kg (142 lb)   SpO2 95%   BMI 25.15 kg/m²   Appearance: Well-nourished, well-developed, no acute distress  Eyes:  PERRLA, EOMI  ENMT: without lesions, deformities;hearing grossly intact; tongue normal, posterior pharynx without erythema or exudate;   Modified Mallampati (AKA Noyola) Score: 4  Neck: Supple, trachea midline, no masses  Respiratory effort:  No intercostal retractions or use of accessory muscles  Lung auscultation:  No wheezes rhonchi rubs or rales  Cardiac: No murmurs, rubs, or gallops; regular rhythm, normal rate; no edema  Abdomen:  No tenderness, no organomegaly.  Normal weight  Musculoskeletal:  arthritic fingers    Skin:  No rashes, petechiae, cyanosis  Neurologic: without focal signs; oriented to person, time, place, and purpose; judgement intact  Psychiatric:  No depression, anxiety, agitation        PROBLEMS:  1. PRAVEEN (obstructive sleep apnea)    - zolpidem (AMBIEN) 5 MG Tab; Take 1 Tab by mouth at bedtime as needed for Sleep (1 to 2 po qhs prn insomnia/sleep study. Bring to sleep study.) for up to 2 doses.  Dispense: 2 Tab; Refill: 0  - Polysomnography, 4 or More; Future    2. Former smoker      3. Up to date with influenza vaccination      4. Moderate episode of recurrent major depressive disorder (HCC)      5. Hypercholesterolemia      6. Body mass index is 25.15 kg/m².          PLAN:   The patient has signs and symptoms consistent with obstructive sleep apnea hypopnea syndrome. Will schedule to have a nocturnal polysomnogram using zolpidem to assist with sleep onset and maintenance should the need arise. Will return after the results are available to determine further diagnostic needs and/or treatment options.    The risks of untreated sleep apnea were discussed with the patient at length. Patients with PRAVEEN are at increased risk of cardiovascular disease including " coronary artery disease, systemic arterial hypertension, pulmonary arterial hypertension, cardiac arrythmias, and stroke. PRAVEEN patients have an increased risk of motor vehicle accidents, type 2 diabetes, chronic kidney disease, and non-alcoholic liver disease. The patient was advised to avoid driving a motor vehicle when drowsy.    Positive airway pressure, such as CPAP, is considered first-line and preferred therapy for sleep apnea and may reverse both symptoms and risks.    Have advised the patient to follow up with the appropriate healthcare practitioners for all other medical problems and issues.      Return for after sleep study.

## 2019-12-17 ENCOUNTER — SLEEP STUDY (OUTPATIENT)
Dept: SLEEP MEDICINE | Facility: MEDICAL CENTER | Age: 64
End: 2019-12-17
Attending: INTERNAL MEDICINE
Payer: COMMERCIAL

## 2019-12-17 DIAGNOSIS — G47.33 OSA (OBSTRUCTIVE SLEEP APNEA): ICD-10-CM

## 2019-12-17 PROCEDURE — 95811 POLYSOM 6/>YRS CPAP 4/> PARM: CPT | Performed by: INTERNAL MEDICINE

## 2019-12-18 NOTE — PROCEDURES
Clinical Comments:    The patient underwent a split night polysomnogram with a CPAP titration using the standard montage for measurement of paramaters of sleep, respiratory events, movement abnormalities, heart rate and rhythm.  A Microphone was used to monitior snoring.  Interpretation:  Study start time was 08:19:54 PM.  Total recording time was 2h 5.5m (125 minutes) with a total sleep time of 2h 3.0m (123 minutes) resulting in a sleep efficiency of 98.01%.  Sleep latency from the start fo the study was 00 minutes minutes and REM latency from sleep onset was 00 minutes minutes.  Respiratory:   There were 80 apneas in total consisting of 80 obstructive apneas, 0 mixed apneas, and 0 central apneas.  There were 33 hypopneas in total.  The apnea index was 39.02 per hour and the hypopnea index was 16.10 per hour.  The overall AHI was 55.1, with a REM AHI of 0.00, and a supine AHI of 21.18.  Limb Movements:  There were a total of 262 periodic leg movements, of which 42 were PLMS arousals.  This resulted in a PLMS index of 127.8 and a PLMS arousal index of 20.5  Oximetry:  The mean SaO2 was 93.0% for the diagnostic portion of the study, with a minimum SaO2 of 78.0%.    Treatment:  Interpretation:  Treatment recording time was 6h 34.0m (394 minutes) with a total sleep time of 5h 7.5m (307 minutes) resulting in a sleep efficiency of 78.0%.    Sleep latency from the start of treatment was 05 minutes minutes and REM latency from sleep onset was 2h 58.5m minutes.    The patient had 147 arousals in total for an arousal index of 28.7.  Respiratory:   There were 86 apneas in total consisting of  61 obstructive apneas, 25 central apneas, and 0 mixed apneas for an apnea index of 16.78.    The patient had 35 hypopneas in total, which resulted in a hypopnea index of 6.83.    The overall AHI was 23.61, with a REM AHI of 7.32, and a supine AHI of 32.20.     Limb Movements:  There were a total of 60 periodic leg movements, of which  19 were PLMS arousals.  This resulted in a PLMS index of 11.7 and a PLMS arousal index of 3.7.  Oximetry:  The mean SaO2 during treatment was 96.0%, with a minimum oxygen saturation of 79.0%.    CPAP was tried from 6cm to 15cm H2O. Bilevel was tried from 17/13cm to 19/14cm, due to central apneas pt was switched back to CPAP. Final pressure CPAP 16cm H2O.    RECORDING TECHNIQUE:       After the scalp was prepared, gold plated electrodes were applied to the scalp according to the International 10-20 System. EEG (electroencephalogram) was continuously monitored from the O1-M2, O2-M1, C3-M2, C4-M1, F3-M2, and F4-M1.   EOGs (electrooculograms) were monitored by electrodes placed at the left and right outer canthi.  Chin EMG (electromyogram) was monitored by electrodes placed on the mentalis and sub-mentalis muscles.  Nasal and oral airflow were monitored using a triple port thermocouple as well as oronasal pressure transducer.  Respiratory effort was measured by inductive plethysmography technology employing abdominal and thoracic belts.  Blood oxygen saturation and pulse were monitored by pulse oximetry.  Heart rhythm was monitored by surface electrocardiogram.  Leg EMG was studied using surface electrodes placed on left and right anterior tibialis.  A microphone was used to monitor tracheal sounds and snoring.  Body position was monitored and documented by technician observation      SUMMARY:    This was an overnight diagnostic polysomnogram with a subsequent positive airway pressure titration, also known as a split- night study.  The patient chose to use a small ResMed F 20 mask with heated humidification.    During the diagnostic phase the total recording time was 125 minutes, the sleep period time was 123 minutes, and the total sleep time was 123 minutes.  The patient's sleep efficiency was 98.01 % which is normal.  The patient experienced 0 REM period(s).    The sleep stage durations revealed 2.5 minutes of wake  after sleep onset (WASO), 5 minutes of N1 sleep, 117 minutes of N2 sleep, 0 minutes of N3 sleep, and 0 minutes of REM.    The latency to sleep was 0 minutes which is reduced.  The latency to REM was not applicable as patient did not achieve REM.  Severe sleep fragmentation occurred.  The arousal index was 61.  The Total Limb Movement (isolated) Index was 6.3, the Limb Movement with Arousal Index was 23.4, and the PLM Series Index was 127.8.    The patient experienced 0 central and 80 obstructive apneas, 0 central and 33 obstructive hypopneas, iron 13 apneas and hypopneas, and 0 RERAS.  The apnea hypopnea index was 55.1, the RDI was 55.1, the mean event duration was 19.4 seconds, and the longest event lasted 103.8 seconds.  The REM index was 0 and the supine index was 21.2.  The apnea hypopnea index represents severe obstructive sleep apnea hypopnea syndrome.    The francia saturation during sleep was 78 % and the patient spent 17.3 % of the recording with saturations less than or equal to 90%.    During sleep, the minimum heart rate was 44 bpm, the mean heart rate was 59 bpm, and the maximum heart rate was 77 bpm.    Once the patient met the split-night protocol, the technician performed a positive airway pressure titration.  The technician initiated treatment with CPAP set at 6 cmH2O and increased the pressure to a maximum of 15 cmH2O. the apnea hypotony index failed to normalize initially on any tested CPAP pressure.  The technician then switch the patient to bilevel with varying pressures but treatment emergent central apnea appeared.  The technician then placed the patient back on CPAP at 16 cm H2O with excellent results.    The patient did best on CPAP 16 cm water with a resultant AHI of 0.46, a minimum saturation of 94 %, and a mean saturation of 97 %.  On CPAP at 16 cm water the patient achieved REM but not supine sleep.    ASSESSMENT:    Severe obstructive sleep apnea hypopnea - AHI 55.1  Significant nocturnal  desaturation - francia saturation 78 % - saturations <90% for 17.3% of the diagnostic recording  Successful CPAP titration to a best pressure of 16 cm H2O with resultant AHI of 0.46, a francia saturation of 94%, mean saturation of 97%, and the achievement of REM but not supine sleep.        RECOMMENDATION:    Recommend CPAP 16 cmH2O using small ResMed F 20 mask and heated humidification followed by data card and clinical review in 6-8 weeks.

## 2019-12-27 ENCOUNTER — HOSPITAL ENCOUNTER (OUTPATIENT)
Dept: RADIOLOGY | Facility: MEDICAL CENTER | Age: 64
End: 2019-12-27
Attending: INTERNAL MEDICINE
Payer: COMMERCIAL

## 2019-12-27 DIAGNOSIS — Z12.31 VISIT FOR SCREENING MAMMOGRAM: ICD-10-CM

## 2019-12-27 PROCEDURE — 77067 SCR MAMMO BI INCL CAD: CPT

## 2019-12-31 ENCOUNTER — HOSPITAL ENCOUNTER (OUTPATIENT)
Dept: RADIOLOGY | Facility: MEDICAL CENTER | Age: 64
End: 2019-12-31

## 2020-02-10 NOTE — PROGRESS NOTES
CC: PSG results      HPI:  Ms. Lizy Merlos is a 64-year-old  kindly referred by Dr. Ban Covington for evaluation of sleep apnea and seen as a new patient on 11/20/2019.  Symptoms include sleepiness and tiredness during the day, loud snoring, and witnessed apneas.    The patient's nocturnal polysomnogram was performed on 12/17/2019 and showed:  Severe obstructive sleep apnea hypopnea - AHI 55.1  Significant nocturnal desaturation - francia saturation 78 % - saturations <90% for 17.3% of the diagnostic recording  Successful CPAP titration to a best pressure of 16 cm H2O with resultant AHI of 0.46, a francia saturation of 94%, a mean saturation of 97%, and the achievement of REM but not supine sleep.  She used a small ResMed F 20 mask and heated humidification.        Significant comorbidities and modifying factors include dyslipidemia, depression, anxiety, back pain, former smoker, near normal BMI, rotator cuff repair, former alcoholic, restless legs syndrome, insomnia for which she takes Remeron, deviated septum repari, and up-to-date with influenza vaccination.     Patient Active Problem List    Diagnosis Date Noted   • PRAVEEN (obstructive sleep apnea) 11/20/2019   • Former smoker 11/20/2019   • Up to date with influenza vaccination 11/20/2019   • Moderate episode of recurrent major depressive disorder (HCC) 07/23/2019   • Hypercholesterolemia 07/23/2019   • Chronic bilateral low back pain with bilateral sciatica 07/23/2019   • Anxiety 07/23/2019   • Nontraumatic complete tear of left rotator cuff 07/23/2019       Past Medical History:   Diagnosis Date   • Anxiety disorder 09/2019   • Arthritis 09/2019    hips, spine, hands   • Depression 09/2019   • High cholesterol    • Restless leg syndrome    • Snoring         Past Surgical History:   Procedure Laterality Date   • PB SHLDR ARTHROSCOP,SURG,W/ROTAT CUFF REPB Left 10/1/2019    Procedure: ARTHROSCOPY, SHOULDER, WITH ROTATOR CUFF REPAIR;  Surgeon:  Lavell Hooks M.D.;  Location: SURGERY [facility];  Service: Orthopedics   • PB SHLDR ARTHROSCOP,PART ACROMIOPLAS Left 10/1/2019    Procedure: DECOMPRESSION, SHOULDER, ARTHROSCOPIC - SUBACROMIAL W/LABRAL DEBRIDEMENT;  Surgeon: Lavell Hooks M.D.;  Location: SURGERY [facility];  Service: Orthopedics   • PB ARTHROSCOPY SHOULDER SURGICAL BICEPS TENODES* Left 10/1/2019    Procedure: ARTHROSCOPY, SHOULDER, WITH BICEPS TENODESIS - W/OPEN SUBPECTORAL TENODESIS, PROCEED AS INDICATED;  Surgeon: Lavell Hooks M.D.;  Location: SURGERY [facility];  Service: Orthopedics   • CERVICAL DISK AND FUSION ANTERIOR   and    • PRIMARY C SECTION      twice    • SHOULDER SURGERY Left    • SINUSCOPE     • TONSILLECTOMY         Family History   Problem Relation Age of Onset   • Dementia Mother    • Heart Disease Mother         pacemaker   • Heart Disease Father         congestive heart failure   • Alcohol/Drug Paternal Uncle    • Dementia Maternal Grandmother    • Alcohol/Drug Maternal Grandfather    • Stroke Paternal Grandmother    • Heart Attack Paternal Grandfather        Social History     Socioeconomic History   • Marital status:      Spouse name: Not on file   • Number of children: Not on file   • Years of education: Not on file   • Highest education level: Not on file   Occupational History   • Not on file   Social Needs   • Financial resource strain: Not on file   • Food insecurity     Worry: Not on file     Inability: Not on file   • Transportation needs     Medical: Not on file     Non-medical: Not on file   Tobacco Use   • Smoking status: Former Smoker     Packs/day: 1.00     Years: 20.00     Pack years: 20.00     Types: Cigarettes     Last attempt to quit: 2009     Years since quittin.1   • Smokeless tobacco: Never Used   Substance and Sexual Activity   • Alcohol use: No     Comment: former alcoholic, stopped 2016   • Drug use: No     Types: Marijuana, Cocaine    • Sexual activity: Yes     Partners: Male     Birth control/protection: Coitus Interruptus     Comment: spouse   Lifestyle   • Physical activity     Days per week: Not on file     Minutes per session: Not on file   • Stress: Not on file   Relationships   • Social connections     Talks on phone: Not on file     Gets together: Not on file     Attends Sabianism service: Not on file     Active member of club or organization: Not on file     Attends meetings of clubs or organizations: Not on file     Relationship status: Not on file   • Intimate partner violence     Fear of current or ex partner: Not on file     Emotionally abused: Not on file     Physically abused: Not on file     Forced sexual activity: Not on file   Other Topics Concern   • Not on file   Social History Narrative   • Not on file       Current Outpatient Medications   Medication Sig Dispense Refill   • meloxicam (MOBIC) 7.5 MG Tab meloxicam 7.5 mg tablet   TAKE 1 TABLET BY MOUTH EVERY DAY Twice a day     • topiramate (TOPAMAX) 25 MG Tab Take 25 mg by mouth as needed.     • Omega-3 Fatty Acids (FISH OIL) 1000 MG Cap capsule Take 1,000 mg by mouth every day.     • therapeutic multivitamin-minerals (THERAGRAN-M) Tab Take 1 Tab by mouth every day.     • cholecalciferol (D-3-5) 5000 UNIT Cap Take 5,000 Units by mouth every day.     • Calcium Acetate, Phos Binder, (CALCIUM ACETATE PO) Take 6,700 mg by mouth.     • Multiple Vitamins-Minerals (EMERGEN-C IMMUNE PO) Take  by mouth.     • TURMERIC CURCUMIN PO Take  by mouth.     • LECITHIN PO Take  by mouth.     • aspirin EC (ECOTRIN) 81 MG Tablet Delayed Response Take 81 mg by mouth every day.     • Cyanocobalamin (B-12 PO) Take  by mouth.     • Naproxen Sodium (ALEVE) 220 MG Cap Take  by mouth.     • acetaminophen (TYLENOL) 500 MG Tab Take 500-1,000 mg by mouth every 6 hours as needed.     • Non Formulary Request Indications: immune drops (clove, cinnamon, lemon, orange, oregano, mandarin, thyme, rosemary,  "nutmeg, maco, eucalyptus, citriodora) and lemon and peppermint oils     • mirtazapine (REMERON) 7.5 MG tablet Take 1 Tab by mouth every bedtime. 90 Tab 3   • escitalopram (LEXAPRO) 20 MG tablet Take 1 Tab by mouth every day. 90 Tab 3   • simvastatin (ZOCOR) 40 MG Tab Take 1 Tab by mouth every evening. 90 Tab 3     No current facility-administered medications for this visit.     \"CURRENT RX\"    ALLERGIES: Patient has no known allergies.    ROS    Constitutional: Denies fever, chills, sweats,  weight loss, fatigue  Cardiovascular: Denies chest pain, tightness, palpitations, swelling in legs/feet, fainting, difficulty breathing when lying down but gets better when sitting up.   Respiratory: Positive for cough  Sleep: per HPI  Gastrointestinal: Denies  difficulty swallowing, nausea, abdominal pain, diarrhea, constipation, heartburn.  Positive for dysphasia  Musculoskeletal: Positive for all the following painful joints, sore muscles, back pain.        PHYSICAL EXAM  Normal weight  /70 (BP Location: Right arm, Patient Position: Sitting, BP Cuff Size: Adult)   Pulse 91   Resp 16   Ht 1.6 m (5' 3\")   Wt 73 kg (161 lb)   SpO2 92%   BMI 28.52 kg/m²   Appearance: Well-nourished, well-developed, no acute distress  Eyes:  PERRLA, EOMI  ENMT: without lesions, deformities;hearing grossly intact; tongue normal, posterior pharynx without erythema or exudate;   Modified Mallampati (AKA Freidman) Score: 4  Neck: Supple, trachea midline, no masses  Respiratory effort:  No intercostal retractions or use of accessory muscles  Lung auscultation:  No wheezes rhonchi rubs or rales  Cardiac: No murmurs, rubs, or gallops; regular rhythm, normal rate; no edema  Abdomen:  No tenderness, no organomegaly  Musculoskeletal:  Grossly normal; gait and station normal; digits and nails normal  Skin:  No rashes, petechiae, cyanosis  Neurologic: without focal signs; oriented to person, time, place, and purpose; judgement " intact  Psychiatric:  No depression, anxiety, agitation        PROBLEMS:  1. PRAVEEN (obstructive sleep apnea)    - DME CPAP    2. Up to date with influenza vaccination      3. Former smoker      4. Hypercholesterolemia      5. Body mass index is 28.52 kg/m².      PLAN:     The patient's nocturnal polysomnogram was performed on 12/17/2019 and showed:  Severe obstructive sleep apnea hypopnea - AHI 55.1  Significant nocturnal desaturation - francia saturation 78 % - saturations <90% for 17.3% of the diagnostic recording  Successful CPAP titration to a best pressure of 16 cm H2O with resultant AHI of 0.46, a francia saturation of 94%, a mean saturation of 97%, and the achievement of REM but not supine sleep.  She used a small ResMed F 20 mask and heated humidification.    Will prescribe CPAP 16 cm H2O using a small ResMed F 20 mask and heated humidification followed by clinical and data card review in 31 to 90 days.    Have advised the patient to follow up with the appropriate healthcare practitioners for all other medical problems and issues.    Return in about 10 weeks (around 4/22/2020).

## 2020-02-12 ENCOUNTER — SLEEP CENTER VISIT (OUTPATIENT)
Dept: SLEEP MEDICINE | Facility: MEDICAL CENTER | Age: 65
End: 2020-02-12
Payer: COMMERCIAL

## 2020-02-12 VITALS
BODY MASS INDEX: 28.53 KG/M2 | SYSTOLIC BLOOD PRESSURE: 120 MMHG | OXYGEN SATURATION: 92 % | DIASTOLIC BLOOD PRESSURE: 70 MMHG | HEART RATE: 91 BPM | WEIGHT: 161 LBS | HEIGHT: 63 IN | RESPIRATION RATE: 16 BRPM

## 2020-02-12 DIAGNOSIS — Z87.891 FORMER SMOKER: ICD-10-CM

## 2020-02-12 DIAGNOSIS — G47.33 OSA (OBSTRUCTIVE SLEEP APNEA): ICD-10-CM

## 2020-02-12 DIAGNOSIS — E78.00 HYPERCHOLESTEROLEMIA: ICD-10-CM

## 2020-02-12 DIAGNOSIS — Z92.29 UP TO DATE WITH INFLUENZA VACCINATION: ICD-10-CM

## 2020-02-12 PROCEDURE — 99214 OFFICE O/P EST MOD 30 MIN: CPT | Performed by: INTERNAL MEDICINE

## 2020-02-12 RX ORDER — MELOXICAM 7.5 MG/1
TABLET ORAL
COMMUNITY
End: 2020-03-27 | Stop reason: SDUPTHER

## 2020-03-27 ENCOUNTER — OFFICE VISIT (OUTPATIENT)
Dept: MEDICAL GROUP | Age: 65
End: 2020-03-27
Payer: COMMERCIAL

## 2020-03-27 VITALS
BODY MASS INDEX: 29.77 KG/M2 | SYSTOLIC BLOOD PRESSURE: 128 MMHG | DIASTOLIC BLOOD PRESSURE: 68 MMHG | HEART RATE: 72 BPM | TEMPERATURE: 98 F | WEIGHT: 168 LBS | HEIGHT: 63 IN | OXYGEN SATURATION: 93 %

## 2020-03-27 DIAGNOSIS — Z00.00 PE (PHYSICAL EXAM), ANNUAL: ICD-10-CM

## 2020-03-27 DIAGNOSIS — Z76.89 ENCOUNTER TO ESTABLISH CARE WITH NEW DOCTOR: Primary | ICD-10-CM

## 2020-03-27 DIAGNOSIS — H61.21 IMPACTED CERUMEN OF RIGHT EAR: ICD-10-CM

## 2020-03-27 DIAGNOSIS — F51.04 PSYCHOPHYSIOLOGICAL INSOMNIA: ICD-10-CM

## 2020-03-27 DIAGNOSIS — G47.33 OSA ON CPAP: ICD-10-CM

## 2020-03-27 DIAGNOSIS — M18.0 OSTEOARTHRITIS OF CARPOMETACARPAL (CMC) JOINT OF BOTH THUMBS: ICD-10-CM

## 2020-03-27 DIAGNOSIS — M54.41 CHRONIC BILATERAL LOW BACK PAIN WITH BILATERAL SCIATICA: ICD-10-CM

## 2020-03-27 DIAGNOSIS — M54.42 CHRONIC BILATERAL LOW BACK PAIN WITH BILATERAL SCIATICA: ICD-10-CM

## 2020-03-27 DIAGNOSIS — G89.29 CHRONIC BILATERAL LOW BACK PAIN WITH BILATERAL SCIATICA: ICD-10-CM

## 2020-03-27 DIAGNOSIS — R01.1 HEART MURMUR: ICD-10-CM

## 2020-03-27 DIAGNOSIS — F41.8 DEPRESSION WITH ANXIETY: ICD-10-CM

## 2020-03-27 DIAGNOSIS — E78.00 HYPERCHOLESTEROLEMIA: ICD-10-CM

## 2020-03-27 PROBLEM — Z87.891 FORMER SMOKER: Status: RESOLVED | Noted: 2019-11-20 | Resolved: 2020-03-27

## 2020-03-27 PROBLEM — M75.122 NONTRAUMATIC COMPLETE TEAR OF LEFT ROTATOR CUFF: Status: RESOLVED | Noted: 2019-07-23 | Resolved: 2020-03-27

## 2020-03-27 PROBLEM — F41.9 ANXIETY: Status: RESOLVED | Noted: 2019-07-23 | Resolved: 2020-03-27

## 2020-03-27 PROBLEM — Z92.29 UP TO DATE WITH INFLUENZA VACCINATION: Status: RESOLVED | Noted: 2019-11-20 | Resolved: 2020-03-27

## 2020-03-27 PROBLEM — C44.92 SCC (SQUAMOUS CELL CARCINOMA): Status: ACTIVE | Noted: 2020-03-27

## 2020-03-27 PROCEDURE — 69210 REMOVE IMPACTED EAR WAX UNI: CPT | Performed by: FAMILY MEDICINE

## 2020-03-27 PROCEDURE — 99204 OFFICE O/P NEW MOD 45 MIN: CPT | Mod: 25 | Performed by: FAMILY MEDICINE

## 2020-03-27 RX ORDER — MELOXICAM 7.5 MG/1
7.5 TABLET ORAL 2 TIMES DAILY PRN
Qty: 60 TAB | Refills: 1 | Status: SHIPPED | OUTPATIENT
Start: 2020-03-27 | End: 2020-03-30 | Stop reason: SDUPTHER

## 2020-03-27 RX ORDER — CYCLOBENZAPRINE HCL 5 MG
TABLET ORAL
COMMUNITY
End: 2020-03-27

## 2020-03-27 RX ORDER — ESCITALOPRAM OXALATE 20 MG/1
20 TABLET ORAL DAILY
Qty: 90 TAB | Refills: 1 | Status: SHIPPED | OUTPATIENT
Start: 2020-03-27 | End: 2020-05-01 | Stop reason: SDUPTHER

## 2020-03-27 RX ORDER — MIRTAZAPINE 7.5 MG/1
7.5 TABLET, FILM COATED ORAL
Qty: 90 TAB | Refills: 1 | Status: SHIPPED | OUTPATIENT
Start: 2020-03-27 | End: 2020-05-01 | Stop reason: SDUPTHER

## 2020-03-27 RX ORDER — SIMVASTATIN 40 MG
40 TABLET ORAL EVERY EVENING
Qty: 90 TAB | Refills: 1 | Status: SHIPPED | OUTPATIENT
Start: 2020-03-27 | End: 2020-11-18

## 2020-03-27 ASSESSMENT — FIBROSIS 4 INDEX: FIB4 SCORE: 0.68

## 2020-03-27 NOTE — LETTER
[employer]  Norma Holguin M.D.  [address]   [place] [address]  Fax: [phone]   Authorization for Release/Disclosure of   Protected Health Information   Name: LIZY MERLOS : 1955 SSN: [id number]   Address: [facility] [address]  [place] [address] Phone:    [phone] (home)    I authorize the entity listed below to release/disclose the PHI below to:   [employer]/Norma Holguin M.D. and Norma Holguin M.D.   Provider or Entity Name: [facility]      Address   City, [facility], [facility]   Phone:      Fax:     Reason for request: continuity of care   Information to be released:    [  ] LAST COLONOSCOPY,  including any PATH REPORT and follow-up  [  ] LAST FIT/COLOGUARD RESULT [  ] LAST DEXA  [  ] LAST MAMMOGRAM  [  ] LAST PAP  [  ] LAST LABS [  ] RETINA EXAM REPORT  [  ] IMMUNIZATION RECORDS  [  ] Release all info      [  ] Check here and initial the line next to each item to release ALL health information INCLUDING  _____ Care and treatment for drug and / or alcohol abuse  _____ HIV testing, infection status, or AIDS  _____ Genetic Testing    DATES OF SERVICE OR TIME PERIOD TO BE DISCLOSED: _____________  I understand and acknowledge that:  * This Authorization may be revoked at any time by you in writing, except if your health information has already been used or disclosed.  * Your health information that will be used or disclosed as a result of you signing this authorization could be re-disclosed by the recipient. If this occurs, your re-disclosed health information may no longer be protected by State or Federal laws.  * You may refuse to sign this Authorization. Your refusal will not affect your ability to obtain treatment.  * This Authorization becomes effective upon signing and will  on (date) __________.      If no date is indicated, this Authorization will  one (1) year from the signature date.    Name: Lizy Merlos    Signature:   Date:     3/27/2020       PLEASE FAX  REQUESTED RECORDS BACK TO: [phone]

## 2020-03-27 NOTE — PROGRESS NOTES
CC: establish care     HPI:     Lizy Merlos is a 64 y.o. female, new patient to the clinic and would like to establish care. She is a former patient of Dr. Ban Covington.     History of PRAVEEN currently on CPAP treatment. This was initially diagnosed in 2020 by Dr. Fitz Richmond, Pulmonology, following review of her overnight sleep study performed on 19. The study showed evidence of severe PRAVEEN with an AHI of 55.1. There were a total of 262 periodic leg movements, of which   42 were PLMS arousals.  This resulted in a PLMS index of   127.8 and a PLMS arousal index of 20.5. The mean SaO2 was 93.0% for the diagnostic portion of the study with a minimum SaO2 of 78. She is tolerating the CPAP treatment well and reports it has improved her fatigue.      History of chronic hypercholesterolemia currently treated with simvastatin 40 mg qhs. She is tolerating the medication well and denies any side effects including muscle aches. Last lipid panel from 19 was WNL.     History of chronic depression and anxiety with associated insomnia currently treated with Remeron 7.5 mg qd and Lexapro 20 mg qd. She reports the medications are effective at treating her symptoms and tolerates them well with no side effects.     History of chronic OA affecting her bilateral CMC joints currently treated with Meloxicam 15 mg qd prn.  Patient was first worked up and diagnosed with Remy in [place] but then followed up locally with [facility]. There she was referred to PT and treated with cortisone injections; however, both treatments failed to provide long term relief. She was prescribed low dose Meloxcam to take as needed and has been doing so since.     Social history reviewed. Patient is  with x2 C-sections. She is currently sexually active. Patient is a former smoker and quit approximately 10 years ago. She has a history of alcohol abuse and does not currently drink. She also has a history of drug usage but denies any current  activity.     Healthcare maintenance reviewed. Her last mammogram was performed in 3/2019 and was normal. Records indicate patient had a colonoscopy in 8/2015 that showed a large 15 mm adenomatous polyp which was removed. Patient reports she had another colonoscopy performed in 5/2019 with [facility] that was normal. We do not have these records at this time. Her last DEXA was in 2018 and was normal. Her last pap smear was completed in 2016 and was normal.     Current medicines (including changes today)  Current Outpatient Medications   Medication Sig Dispense Refill   • mirtazapine (REMERON) 7.5 MG tablet Take 1 Tab by mouth every bedtime. 90 Tab 1   • simvastatin (ZOCOR) 40 MG Tab Take 1 Tab by mouth every evening. 90 Tab 1   • escitalopram (LEXAPRO) 20 MG tablet Take 1 Tab by mouth every day. 90 Tab 1   • meloxicam (MOBIC) 7.5 MG Tab Take 1 Tab by mouth 2 times a day as needed for Moderate Pain, Severe Pain or Inflammation. 60 Tab 1   • Omega-3 Fatty Acids (FISH OIL) 1000 MG Cap capsule Take 1,000 mg by mouth every day.     • therapeutic multivitamin-minerals (THERAGRAN-M) Tab Take 1 Tab by mouth every day.     • cholecalciferol (D-3-5) 5000 UNIT Cap Take 5,000 Units by mouth every day.     • Calcium Acetate, Phos Binder, (CALCIUM ACETATE PO) Take 6,700 mg by mouth.     • Multiple Vitamins-Minerals (EMERGEN-C IMMUNE PO) Take  by mouth.     • TURMERIC CURCUMIN PO Take  by mouth.     • LECITHIN PO Take  by mouth.     • Cyanocobalamin (B-12 PO) Take  by mouth.     • acetaminophen (TYLENOL) 500 MG Tab Take 500-1,000 mg by mouth every 6 hours as needed.     • Non Formulary Request Indications: immune drops (clove, cinnamon, lemon, orange, oregano, mandarin, thyme, rosemary, nutmeg, ginger, eucalyptus, citriodora) and lemon and peppermint oils       No current facility-administered medications for this visit.      She  has a past medical history of Anxiety disorder (09/2019), Arthritis (09/2019), Depression  (2019), High cholesterol, Restless leg syndrome, and Snoring.  She  has a past surgical history that includes shoulder surgery (Left); cervical disk and fusion anterior ( and ); pr shldr arthroscop,surg,w/rotat cuff repr (Left, 10/1/2019); pr shldr arthroscop,part acromioplas (Left, 10/1/2019); pr arthroscopy shoulder surgical biceps tenodes* (Left, 10/1/2019); tonsillectomy; sinuscope; and primary c section.  Social History     Tobacco Use   • Smoking status: Former Smoker     Packs/day: 1.00     Years: 20.00     Pack years: 20.00     Types: Cigarettes     Last attempt to quit: 2009     Years since quittin.2   • Smokeless tobacco: Never Used   Substance Use Topics   • Alcohol use: Not Currently     Comment: former alcoholic, stopped 2016   • Drug use: No     Types: Marijuana, Cocaine     Social History     Social History Narrative   • Not on file     Family History   Problem Relation Age of Onset   • Dementia Mother    • Heart Disease Mother         pacemaker   • Heart Disease Father         congestive heart failure   • Alcohol/Drug Paternal Uncle    • Dementia Maternal Grandmother    • Alcohol/Drug Maternal Grandfather    • Stroke Paternal Grandmother    • Heart Attack Paternal Grandfather      Family Status   Relation Name Status   • Mo  Alive   • Fa     • Sis  Alive   • PUnc  (Not Specified)   • MGMo     • MGFa     • PGMo     • PGFa         I personally reviewed patient's problem list, allergies, medications, family hx, social hx with patient and update EPIC.     REVIEW OF SYSTEMS:  CONSTITUTIONAL:  Denies night sweats, fatigue, malaise, lethargy, fever or chills.  RESPIRATORY:  Denies cough, wheeze, hemoptysis, or shortness of breath.  CARDIOVASCULAR:  Denies chest pains, palpitations, pedal edema  GASTROINTESTINAL:  Denies abdominal pain, nausea or vomiting, diarrhea, constipation, hematemesis, hematochezia, melena.  GENITOURINARY:  Denies urinary  "urgency, frequency, dysuria, or hematuria.  No obstructive symptoms.  Denies unusual discharge.    All other systems reviewed and are negative     Objective:     /68 (BP Location: Right arm, Patient Position: Sitting, BP Cuff Size: Adult)   Pulse 72   Temp 36.7 °C (98 °F) (Temporal)   Ht 1.6 m (5' 3\")   Wt 76.2 kg (168 lb)   SpO2 93%  Body mass index is 29.76 kg/m².  Physical Exam:    Constitutional: Awake, alert, in no apparent distress, overweight.  Skin: Warm, dry, good turgor, no rashes/jaundice in visible areas.  Eye: PERRL, intact EOM, conjunctiva clear, lids normal.  ENMT: Nasal & oral mucosa wnl, lips without lesions, good dentition, oropharynx clear.  -Severe cerumen impaction right ear.   Neck: Trachea midline, no masses, no thyromegaly. No cervical or supraclavicular lymphadenopathy.  Respiratory: Unlabored respiratory effort, lungs clear to auscultation, no wheezes, no rales.  Cardiovascular: Normal S1, S2, no rubs, no gallops, no pedal edema.  -Grade 2/6 systolic ejection murmur best heard at the right upper sternum.   Psych: Alert and oriented x3, affect and mood wnl, intact judgement and insight.       Assessment and Plan:   The following treatment plan was discussed    1. Depression with anxiety  Chronic, controlled with Lexapro 20 mg qd, she also take Remeron at night. Her symptoms are stable and under good control. She does not work with [employer]. She denies SI/HI. Plans:   - escitalopram (LEXAPRO) 20 MG tablet; Take 1 Tab by mouth every day.  Dispense: 90 Tab; Refill: 1  - Appropriate counseling provided. Topics might include: regular exercise, well-balanced diet, multi-vitamin daily, weight loss, adequate sleep, meditation, stress management, avoidance of excessive consumption of caffeine, alcohol, illicit drugs, tobacco.      2. Hypercholesterolemia  Chronic, controlled with Zocor 40 mg qd, no s/e reported, will continue.    - simvastatin (ZOCOR) 40 MG Tab; Take 1 Tab by mouth every " evening.  Dispense: 90 Tab; Refill: 1    3. Osteoarthritis of carpometacarpal (CMC) joint of both thumbs  Chronic, s/p steroid injections, PT. Both were helping but symptoms remain persistent. She takes Meloxicam 7.5 mg BID for pain. I discussed health risks associated with chronic NSAID consumption. Recommended OTC Tylenol extra strength TID PRN. Ok to take Meloxicam 7.5 mg BID for occasional flares.   - meloxicam (MOBIC) 7.5 MG Tab; Take 1 Tab by mouth 2 times a day as needed for Moderate Pain, Severe Pain or Inflammation.  Dispense: 60 Tab; Refill: 1    4. Chronic bilateral low back pain with bilateral sciatica  Chronic, controlled with stretching exercises and medication.   - Tylenol extra strength TID PRN  - meloxicam (MOBIC) 7.5 MG Tab; Take 1 Tab by mouth 2 times a day as needed for Moderate Pain, Severe Pain or Inflammation.  Dispense: 60 Tab; Refill: 1  - cont stretching exercise, weight loss.     5. PRAVEEN on CPAP  - Pt was counseled on risks of untreated PRAVEEN including: increased risk of cardiovascular disease including coronary artery disease, systemic arterial hypertension, pulmonary arterial hypertension, cardiac arrythmias, stroke, increased risk of MVA due to drowsiness, type 2 diabetes, chronic kidney disease, and non-alcoholic liver disease.   - recommended weight loss and consistent use of CPAP  - f/u with sleep medicine as directed.      6. Psychophysiological insomnia  - mirtazapine (REMERON) 7.5 MG tablet; Take 1 Tab by mouth every bedtime.  Dispense: 90 Tab; Refill: 1  - Melatonin nightly   - regular exercise, well-balanced diet, multi-vitamin daily, meditation, stress reduction.   - Avoid excessive consumption of stimulants, alcohol, illicit drugs, tobacco  - Avoid using computer or electronic devices at night  - Avoid watching TV on bed  - Avoid napping during the day  - Treat anxiety and depression as above   - f/u PRN.      7. Heart murmur  Exam was notable for grade 3/6 systolic ejection  murmur best heard at the R upper sternum. Per patient, this is a known issue. She feels well. She is not interested in further evaluation or treatment     8. Impacted cerumen of right ear  Exam was notable for cerumen impaction of the R ear.   - ear lavage     9. Encounter to establish care with new doctor  General counseling provided, topics might include: diet, exercise, vitamin supplement, mental health, sleep, stress management, pap, mammogram, colonoscopy and vaccine recommendations.      - CBC WITH DIFFERENTIAL; Future  - Comp Metabolic Panel; Future  - HEMOGLOBIN A1C; Future  - Lipid Profile; Future     Procedure note: ear wax removal.   R ear partially irrigated by MA. I personally removed the rest of ear wax using a lighted curette pt tolerated the procedure well, no immediate complication noted.       Norma Holguin M.D.    Records requested.  Followup: Return in about 6 months (around 9/27/2020) for annual PE.    Please note that this dictation was created using voice recognition software and/or scribes. I have made every reasonable attempt to correct obvious errors, but I expect that there are errors of grammar and possibly content that I did not discover before finalizing the note.     Kenney JOSEPH (Scribe), am scribing for, and in the presence of, Norma Holguin M.D.    Electronically signed by: Kenney Cardenas ([place]), 3/27/2020    Norma JOSEPH M.D. personally performed the services described in this documentation, as scribed by Kenney Cardenas in my presence, and it is both accurate and complete.

## 2020-03-30 ENCOUNTER — TELEPHONE (OUTPATIENT)
Dept: MEDICAL GROUP | Age: 65
End: 2020-03-30

## 2020-03-30 DIAGNOSIS — M18.0 OSTEOARTHRITIS OF CARPOMETACARPAL (CMC) JOINT OF BOTH THUMBS: ICD-10-CM

## 2020-03-30 DIAGNOSIS — M54.42 CHRONIC BILATERAL LOW BACK PAIN WITH BILATERAL SCIATICA: ICD-10-CM

## 2020-03-30 DIAGNOSIS — M54.41 CHRONIC BILATERAL LOW BACK PAIN WITH BILATERAL SCIATICA: ICD-10-CM

## 2020-03-30 DIAGNOSIS — G89.29 CHRONIC BILATERAL LOW BACK PAIN WITH BILATERAL SCIATICA: ICD-10-CM

## 2020-03-30 RX ORDER — MELOXICAM 7.5 MG/1
7.5 TABLET ORAL 2 TIMES DAILY PRN
Qty: 60 TAB | Refills: 1 | Status: SHIPPED | OUTPATIENT
Start: 2020-03-30 | End: 2020-05-01

## 2020-03-30 NOTE — TELEPHONE ENCOUNTER
MEDICATION PRIOR AUTHORIZATION NEEDED:    1. Name of Medication: Meloxicam 7.5MG     2. Requested By (Name of Pharmacy): [facility]     3. Is insurance on file current? Ali Chuk     4. What is the name & phone number of the 3rd party payor? [employer] cover my meds     Would you like PAR started?

## 2020-03-30 NOTE — TELEPHONE ENCOUNTER
Phone Number Called: [phone] (home)       Call outcome: Spoke to patient regarding message below.    Message: let pt know script and coupon is ready to be picked up at

## 2020-03-30 NOTE — TELEPHONE ENCOUNTER
Please advise pt to stop by the clinic to  Rx and coupon to help her pay for Meloxicam.   Norma Holguin M.D.

## 2020-04-17 ENCOUNTER — PATIENT MESSAGE (OUTPATIENT)
Dept: MEDICAL GROUP | Age: 65
End: 2020-04-17

## 2020-04-17 DIAGNOSIS — F31.9 BIPOLAR 1 DISORDER (HCC): ICD-10-CM

## 2020-04-17 DIAGNOSIS — F41.8 DEPRESSION WITH ANXIETY: ICD-10-CM

## 2020-04-17 NOTE — PATIENT COMMUNICATION
.1. Caller Name: Lizy Merlos                          Call Back Number: [phone] (home)         How would the patient prefer to be contacted with a response: [employer] message    2. SPECIFIC Action To Be Taken: Referral pending, please sign.    3. Diagnosis/Clinical Reason for Request: depressive episodes     4. Specialty & Provider Name/Lab/Imaging Location: Any location that accepts patients insurance    5. Is appointment scheduled for requested order/referral: no    Patient was not informed they will receive a return phone call from the office ONLY if there are any questions before processing their request. Advised to call back if they haven't received a call from the referral department in 5 days.
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
## 2020-05-01 ENCOUNTER — TELEMEDICINE (OUTPATIENT)
Dept: BEHAVIORAL HEALTH | Facility: CLINIC | Age: 65
End: 2020-05-01
Payer: COMMERCIAL

## 2020-05-01 VITALS — HEIGHT: 63 IN | BODY MASS INDEX: 29.23 KG/M2 | WEIGHT: 165 LBS

## 2020-05-01 DIAGNOSIS — F41.8 DEPRESSION WITH ANXIETY: ICD-10-CM

## 2020-05-01 DIAGNOSIS — F51.04 PSYCHOPHYSIOLOGICAL INSOMNIA: ICD-10-CM

## 2020-05-01 DIAGNOSIS — F41.1 GENERALIZED ANXIETY DISORDER: ICD-10-CM

## 2020-05-01 DIAGNOSIS — F33.1 MODERATE EPISODE OF RECURRENT MAJOR DEPRESSIVE DISORDER (HCC): ICD-10-CM

## 2020-05-01 PROCEDURE — 96127 BRIEF EMOTIONAL/BEHAV ASSMT: CPT | Performed by: PSYCHIATRY & NEUROLOGY

## 2020-05-01 PROCEDURE — 99215 OFFICE O/P EST HI 40 MIN: CPT | Performed by: PSYCHIATRY & NEUROLOGY

## 2020-05-01 RX ORDER — BUSPIRONE HYDROCHLORIDE 15 MG/1
7.5 TABLET ORAL 2 TIMES DAILY
Qty: 60 TAB | Refills: 1 | Status: SHIPPED | OUTPATIENT
Start: 2020-05-01 | End: 2020-07-06 | Stop reason: SDUPTHER

## 2020-05-01 RX ORDER — ESCITALOPRAM OXALATE 20 MG/1
20 TABLET ORAL DAILY
Qty: 90 TAB | Refills: 1 | Status: SHIPPED | OUTPATIENT
Start: 2020-05-01 | End: 2020-07-06 | Stop reason: SDUPTHER

## 2020-05-01 RX ORDER — MIRTAZAPINE 7.5 MG/1
7.5 TABLET, FILM COATED ORAL
Qty: 90 TAB | Refills: 1 | Status: SHIPPED | OUTPATIENT
Start: 2020-05-01 | End: 2020-07-06 | Stop reason: SDUPTHER

## 2020-05-01 ASSESSMENT — PATIENT HEALTH QUESTIONNAIRE - PHQ9
5. POOR APPETITE OR OVEREATING: 1 - SEVERAL DAYS
SUM OF ALL RESPONSES TO PHQ QUESTIONS 1-9: 13
CLINICAL INTERPRETATION OF PHQ2 SCORE: 3

## 2020-05-01 ASSESSMENT — FIBROSIS 4 INDEX: FIB4 SCORE: 0.69

## 2020-05-12 ENCOUNTER — TELEMEDICINE (OUTPATIENT)
Dept: BEHAVIORAL HEALTH | Facility: CLINIC | Age: 65
End: 2020-05-12
Payer: COMMERCIAL

## 2020-05-12 DIAGNOSIS — F33.1 MODERATE EPISODE OF RECURRENT MAJOR DEPRESSIVE DISORDER (HCC): ICD-10-CM

## 2020-05-12 DIAGNOSIS — F41.1 GENERALIZED ANXIETY DISORDER: ICD-10-CM

## 2020-05-12 PROCEDURE — 90791 PSYCH DIAGNOSTIC EVALUATION: CPT | Mod: 95,CR | Performed by: PSYCHOLOGIST
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
## 2020-05-26 ENCOUNTER — APPOINTMENT (OUTPATIENT)
Dept: BEHAVIORAL HEALTH | Facility: CLINIC | Age: 65
End: 2020-05-26
Payer: COMMERCIAL

## 2020-06-02 ENCOUNTER — TELEMEDICINE (OUTPATIENT)
Dept: BEHAVIORAL HEALTH | Facility: CLINIC | Age: 65
End: 2020-06-02
Payer: COMMERCIAL

## 2020-06-02 DIAGNOSIS — F33.1 MODERATE EPISODE OF RECURRENT MAJOR DEPRESSIVE DISORDER (HCC): ICD-10-CM

## 2020-06-02 DIAGNOSIS — F41.1 GENERALIZED ANXIETY DISORDER: ICD-10-CM

## 2020-06-02 PROCEDURE — 90834 PSYTX W PT 45 MINUTES: CPT | Mod: 95,CR | Performed by: PSYCHOLOGIST
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
## 2020-06-04 ENCOUNTER — TELEMEDICINE (OUTPATIENT)
Dept: SLEEP MEDICINE | Facility: MEDICAL CENTER | Age: 65
End: 2020-06-04
Payer: COMMERCIAL

## 2020-06-04 VITALS — WEIGHT: 165 LBS | HEIGHT: 63 IN | BODY MASS INDEX: 29.23 KG/M2

## 2020-06-04 DIAGNOSIS — R06.83 SNORING: ICD-10-CM

## 2020-06-04 DIAGNOSIS — Z87.891 FORMER SMOKER: ICD-10-CM

## 2020-06-04 DIAGNOSIS — G47.10 HYPERSOMNOLENCE: ICD-10-CM

## 2020-06-04 DIAGNOSIS — G47.33 OBSTRUCTIVE SLEEP APNEA SYNDROME: ICD-10-CM

## 2020-06-04 PROCEDURE — 99214 OFFICE O/P EST MOD 30 MIN: CPT | Mod: 95,CR | Performed by: INTERNAL MEDICINE

## 2020-06-04 ASSESSMENT — FIBROSIS 4 INDEX: FIB4 SCORE: 0.69

## 2020-06-04 NOTE — PROGRESS NOTES
CC: Sleep apnea hypopnea syndrome, excessive daytime somnolence.    HPI:  This evaluation was conducted via telemedicine using secure encrypted software on the [employer] platform.  The patient was physically located in her home and the physician was located in the [facility] sleep center in [facility].  The medical assistant at the originating site provided assistance with the medical history.  The patient's identity was confirmed and verbal consent for the telemedicine interview was obtained.    Ms. Nat Merlos evaluated here in November 2019 for snoring, witnessed nocturnal apnea and excessive daytime somnolence.  A split-night polysomnogram on December 17, 2019 demonstrated an apnea hypopnea index of 55.1 events per hour with a lowest arterial oxygen saturation of 78%.  She was titrated to CPAP at 16 cm water pressure where the apnea hypopnea index fell to 0.5 events per hour with a lowest arterial oxygen saturation of 94% on room air.    He has adjusted well to the CPAP.  She is using the machine almost every night, throughout the night.  She is not having unusual problems with mask fit, leakage or airway dryness using a small F 20 fullface mask and the heated humidifier.  She has noticed a dramatic improvement in daytime alertness.  She is not dozing off during the day and is not napping.  She is not snoring on CPAP.    The CPAP data recording chip information is reviewed with the patient.  It demonstrates use on 27 of the last 30 days with an average usage of 7 hours and 27 minutes.  Leak is modest and the estimated residual apnea hypopnea index is 8.8 events per hour.  The ramp is set for 45-minute delay at 4 cm of water.        Patient Active Problem List    Diagnosis Date Noted   • Moderate episode of recurrent major depressive disorder (HCC) 05/01/2020   • Generalized anxiety disorder 05/01/2020   • Osteoarthritis of carpometacarpal (CMC) joint of both thumbs_Dr. Remy  03/27/2020   • Hx of SCC_R  temporal_removed in 3/2018 in [place] 03/27/2020   • Heart murmur 03/27/2020   • PRAVEEN on CPAP 11/20/2019   • Depression with anxiety 07/23/2019   • Hypercholesterolemia 07/23/2019   • Chronic bilateral low back pain with bilateral sciatica_[facility] [place] 07/23/2019       Past Medical History:   Diagnosis Date   • Alcohol abuse    • Anxiety disorder 09/2019   • Arthritis 09/2019    hips, spine, hands   • Depression 09/2019   • High cholesterol    • Liver disease    • Restless leg syndrome    • Snoring        Past Surgical History:   Procedure Laterality Date   • PB SHLDR ARTHROSCOP,SURG,W/ROTAT CUFF REPB Left 10/1/2019    Procedure: ARTHROSCOPY, SHOULDER, WITH ROTATOR CUFF REPAIR;  Surgeon: Lavell Hooks M.D.;  Location: [facility];  Service: Orthopedics   • PB SHLDR ARTHROSCOP,PART ACROMIOPLAS Left 10/1/2019    Procedure: DECOMPRESSION, SHOULDER, ARTHROSCOPIC - SUBACROMIAL W/LABRAL DEBRIDEMENT;  Surgeon: Lavell Hooks M.D.;  Location: [facility];  Service: Orthopedics   • PB ARTHROSCOPY SHOULDER SURGICAL BICEPS TENODES* Left 10/1/2019    Procedure: ARTHROSCOPY, SHOULDER, WITH BICEPS TENODESIS - W/OPEN SUBPECTORAL TENODESIS, PROCEED AS INDICATED;  Surgeon: Lavell Hooks M.D.;  Location: [facility];  Service: Orthopedics   • CERVICAL DISK AND FUSION ANTERIOR  1993 and 1994   • PRIMARY C SECTION      twice    • SHOULDER SURGERY Left    • SINUSCOPE     • TONSILLECTOMY         Family History   Problem Relation Age of Onset   • Dementia Mother    • Heart Disease Mother         pacemaker   • Heart Disease Father         congestive heart failure   • Alcohol/Drug Paternal Uncle    • Dementia Maternal Grandmother    • Depression Maternal Grandmother    • Alcohol/Drug Maternal Grandfather    • Stroke Paternal Grandmother    • Heart Attack Paternal Grandfather        Social History     Socioeconomic History   • Marital status:      Spouse name: Not on file    • Number of children: 2   • Years of education: Not on file   • Highest education level: Not on file   Occupational History   • Occupation: [employer] tech   Social Needs   • Financial resource strain: Not on file   • Food insecurity     Worry: Not on file     Inability: Not on file   • Transportation needs     Medical: Not on file     Non-medical: Not on file   Tobacco Use   • Smoking status: Former Smoker     Packs/day: 1.00     Years: 20.00     Pack years: 20.00     Types: Cigarettes     Last attempt to quit: 2009     Years since quittin.4   • Smokeless tobacco: Never Used   Substance and Sexual Activity   • Alcohol use: Not Currently     Comment: former alcoholic, stopped 2016   • Drug use: Not Currently     Types: Marijuana, Cocaine   • Sexual activity: Yes     Partners: Male     Birth control/protection: Post-Menopausal     Comment: spouse   Lifestyle   • Physical activity     Days per week: Not on file     Minutes per session: Not on file   • Stress: Not on file   Relationships   • Social connections     Talks on phone: Not on file     Gets together: Not on file     Attends Scientologist service: Not on file     Active member of club or organization: Not on file     Attends meetings of clubs or organizations: Not on file     Relationship status: Not on file   • Intimate partner violence     Fear of current or ex partner: Not on file     Emotionally abused: Not on file     Physically abused: Not on file     Forced sexual activity: Not on file   Other Topics Concern   • Not on file   Social History Narrative   • Not on file       Current Outpatient Medications   Medication Sig Dispense Refill   • escitalopram (LEXAPRO) 20 MG tablet Take 1 Tab by mouth every day. 90 Tab 1   • mirtazapine (REMERON) 7.5 MG tablet Take 1 Tab by mouth every bedtime. 90 Tab 1   • busPIRone (BUSPAR) 15 MG tablet Take 0.5 Tabs by mouth 2 times a day. 60 Tab 1   • simvastatin (ZOCOR) 40 MG Tab Take 1 Tab by mouth every evening. 90 Tab  "1   • Omega-3 Fatty Acids (FISH OIL) 1000 MG Cap capsule Take 1,000 mg by mouth every day.     • therapeutic multivitamin-minerals (THERAGRAN-M) Tab Take 1 Tab by mouth every day.     • cholecalciferol (D-3-5) 5000 UNIT Cap Take 5,000 Units by mouth every day.     • Calcium Acetate, Phos Binder, (CALCIUM ACETATE PO) Take 6,700 mg by mouth.     • Multiple Vitamins-Minerals (EMERGEN-C IMMUNE PO) Take  by mouth.     • TURMERIC CURCUMIN PO Take  by mouth.     • LECITHIN PO Take  by mouth.     • Cyanocobalamin (B-12 PO) Take  by mouth.     • acetaminophen (TYLENOL) 500 MG Tab Take 500-1,000 mg by mouth every 6 hours as needed.     • Non Formulary Request Indications: immune drops (clove, cinnamon, lemon, orange, oregano, mandarin, thyme, rosemary, nutmeg, ginger, eucalyptus, citriodora) and lemon and peppermint oils       No current facility-administered medications for this visit.     \"CURRENT RX\"      Allergies: Patient has no known allergies.      ROS  Unchanged from prior.      Physical Exam:   Ht 1.6 m (5' 3\")   Wt 74.8 kg (165 lb)   BMI 29.23 kg/m²    Limited by the telemedicine interface.  She is a well-developed, well-nourished woman who is alert, oriented and appropriately responsive.  She does not appear dyspneic and is not coughing.      Problems:  1. Obstructive sleep apnea syndrome  She has severe obstructive sleep apnea hypopnea with an apnea hypopnea index of 55.9 events per hour and a lowest arterial oxygen saturation of 78%.  She is doing well on CPAP at 16 cm water pressure.  She is using machine regularly and for sufficient number of hours, as confirmed by the data recording chip.  She has noticed a marked improvement in daytime alertness and has a reasonably low residual apnea hypopnea index of 8.8 events per hour.  I suspect that the ramp function is in place too long and probably artificially elevates the apnea hypopnea index.    2. Hypersomnolence  Improved on nocturnal CPAP.    3. " Snoring  Resolved on nocturnal CPAP.    4. Former smoker      Plan:   1.  Continue CPAP at 16 cm water pressure with the small F 20 fullface mask.    2.  Return visit here in a year, or sooner if new problems develop.    We appreciate the opportunity to assist in her care.

## 2020-06-16 ENCOUNTER — TELEMEDICINE (OUTPATIENT)
Dept: BEHAVIORAL HEALTH | Facility: CLINIC | Age: 65
End: 2020-06-16
Payer: COMMERCIAL

## 2020-06-16 DIAGNOSIS — F41.1 GENERALIZED ANXIETY DISORDER: ICD-10-CM

## 2020-06-16 DIAGNOSIS — F33.1 MODERATE EPISODE OF RECURRENT MAJOR DEPRESSIVE DISORDER (HCC): ICD-10-CM

## 2020-06-16 PROCEDURE — 90834 PSYTX W PT 45 MINUTES: CPT | Mod: 95,CR | Performed by: PSYCHOLOGIST

## 2020-06-16 NOTE — BH THERAPY
" [facility]  Therapy Progress Note    Patient Name: Lizy Merlos  Patient MRN: [id number]  Today's Date: 6/16/2020     Type of session:Individual psychotherapy  Length of session: 45 minutes  Persons in attendance:Patient     This encounter was conducted via Zoom.   Verbal consent was obtained. Patient's identity was verified.    Subjective/New Info:   Lizy Merlos is a 65 year old white female referred for assessment and treatment by Darian Pathak M.D. (psychiatrist). Patient continues to appreciate an improved mood. She has been reading When Panic Attacks and has been finding it very helpful. She stated that she is realizing that \"I am my own worst enemy\" and has been evaluating how others comments and behaviors effect her. Talked with patient about the importance of evaluating her responses to what others do and say. Reminded the patient that just because she is invited to an argument, she does not have to attend. Role played some situations where she feels that she is being personally attacked. Encouraged patient to remove poisonous people from her life. She appears to be responding very well to cognitive behavioral therapy. Patient stated that on June 12 she celebrated her fourth year of sobriety.      Patient did not present in acute distress. Patient was appropriately groomed and cooperative. Patient was alert and oriented to person, place, and time. Eye contact was appropriate. No abnormalities in attention or concentration were noted. No abnormalities of movement present; psychomotor activity was normal. Speech was fluent and regular in rhythm, rate, volume, and tone. Thought processes were linear, logical, and goal-directed. There was no evidence of thought disorder. No auditory or visual hallucinations. Long and short term memory appeared to be intact. Insight, judgment, and impulse control were deemed to be within normal limits. Reported mood was fairly negative, but she " appears quite motivated to make changes. Affect was appropriate and congruent with thought content and conversation. Patient denied current suicidal and homicidal ideation or plan, intent, and preparatory behavior.     Objective/Observations:              Participation: Active verbal participation, Attentive, Engaged and Open to feedback              Grooming: Casual and Neat              Cognition: Alert and Fully Oriented              Eye contact: Good              Mood: Depressed and Anxious              Affect: Flexible and Congruent with content              Thought process: Logical and Goal-directed              Speech: Rate within normal limits and Volume within normal limits              Other:       Diagnoses:   1. Moderate episode of recurrent major depressive disorder (HCC)    2. Generalized anxiety disorder         Current risk:   SUICIDE: Moderate   Homicide: Low   Self-harm: Low   Relapse: Not applicable   Other:    Safety Plan reviewed? Yes   If evidence of imminent risk is present, intervention/plan:     Therapeutic Intervention(s): Cognitive modification, Conflict resolution skills, Distress tolerance skills, Leisure and recreation skills, Stressors assessed and Supportive psychotherapy    Treatment Goal(s)/Objective(s) addressed:   Reduce symptoms of Depression:  Objective A: Patient will increase activity level by will participating in at least two hours, three times per week in a social or leisure activity with family member or close friend.  Objective B: Patient will express an increase in positive statements about self by  will making at least five positive statements per day about self circumstances.  Objective C: Patient will spend more time with friend in social situations by  spending at least thirty minutes four times per week in a social situation, interacting appropriately with a friend.     Reduce Symptoms of Anxiety:  Objective A: Patient will learn one effective technique for dealing  with anxiety each week, and utilize it effectively when  feeling anxious.  Objective B: Patient will express an increase in positive statements by making at least five positive statements per day about self or current circumstances.  Objective C: Patient will increase activity level by participating in at least two hours, three times per week in a leisure or social activity.      Goal: Be free of thoughts of self-harm/self-mutilation,  Learn two ways to manage frustration in a positive manner   Explore triggers of thoughts to harm self or others   Call crisis hotline when needed   Report feeling more positive about self and abilities   Develop a crisis plan and share it with key people   Remove weapons from the home [and other means] when necessary    Progress toward Treatment Goals: Mild improvement    Plan:  - Continue Individual therapy    Onesimo Rowan, Ph.D.  6/16/2020

## 2020-07-02 ENCOUNTER — TELEMEDICINE (OUTPATIENT)
Dept: BEHAVIORAL HEALTH | Facility: CLINIC | Age: 65
End: 2020-07-02
Payer: COMMERCIAL

## 2020-07-02 DIAGNOSIS — F41.1 GENERALIZED ANXIETY DISORDER: ICD-10-CM

## 2020-07-02 DIAGNOSIS — F33.1 MODERATE EPISODE OF RECURRENT MAJOR DEPRESSIVE DISORDER (HCC): ICD-10-CM

## 2020-07-02 PROCEDURE — 90834 PSYTX W PT 45 MINUTES: CPT | Mod: 95,CR | Performed by: PSYCHOLOGIST

## 2020-07-02 NOTE — BH THERAPY
" [facility]  Therapy Progress Note    Patient Name: Lizy Merlos  Patient MRN: [id number]  Today's Date: 7/2/2020     Type of session:Individual psychotherapy  Length of session: 45 minutes  Persons in attendance:Patient     This encounter was conducted via Zoom.   Verbal consent was obtained. Patient's identity was verified.    Subjective/New Info:   Lizy Merlos is a 65 year old white female referred for assessment and treatment by Darian Pathak M.D. (psychiatrist). Patient reported that she has been doing much better, but she did have one incident of having an argument with her . She has been reading When Panic Attacks and is finding it very worthwhile and has been practicing the techniques. Talked with patient about the importance of not getting sucked into arguments, but she stated that \"I think I just get my feelings hurt and I automatically respond\". Patient tends to respond to a negative thought as if it is a life threatening event. Talked with patient about importance of not allowing others to have such a negative impact on her.     Patient did not present in acute distress. Patient was appropriately groomed and cooperative. Patient was alert and oriented to person, place, and time. Eye contact was appropriate. No abnormalities in attention or concentration were noted. No abnormalities of movement present; psychomotor activity was normal. Speech was fluent and regular in rhythm, rate, volume, and tone. Thought processes were linear, logical, and goal-directed. There was no evidence of thought disorder. No auditory or visual hallucinations. Long and short term memory appeared to be intact. Insight, judgment, and impulse control were deemed to be within normal limits. Reported mood as improvng, and she appears quite motivated to make changes. Affect was appropriate and congruent with thought content and conversation. Patient denied current suicidal and homicidal ideation " or plan, intent, and preparatory behavior.     Objective/Observations:              Participation: Active verbal participation, Attentive, Engaged and Open to feedback              Grooming: Casual and Neat              Cognition: Alert and Fully Oriented              Eye contact: Good              Mood: Depressed and Anxious              Affect: Flexible and Congruent with content              Thought process: Logical and Goal-directed              Speech: Rate within normal limits and Volume within normal limits              Other:       Diagnoses:   1. Moderate episode of recurrent major depressive disorder (HCC)    2. Generalized anxiety disorder         Current risk:   SUICIDE: Moderate   Homicide: Low   Self-harm: Low   Relapse: Not applicable   Other:    Safety Plan reviewed? Yes   If evidence of imminent risk is present, intervention/plan:     Therapeutic Intervention(s): Cognitive modification, Conflict resolution skills, Distress tolerance skills, Leisure and recreation skills, Stressors assessed and Supportive psychotherapy    Treatment Goal(s)/Objective(s) addressed:   Reduce symptoms of Depression:  Objective A: Patient will increase activity level by will participating in at least two hours, three times per week in a social or leisure activity with family member or close friend.  Objective B: Patient will express an increase in positive statements about self by  will making at least five positive statements per day about self circumstances.  Objective C: Patient will spend more time with friend in social situations by  spending at least thirty minutes four times per week in a social situation, interacting appropriately with a friend.     Reduce Symptoms of Anxiety:  Objective A: Patient will learn one effective technique for dealing with anxiety each week, and utilize it effectively when  feeling anxious.  Objective B: Patient will express an increase in positive statements by making at least five  positive statements per day about self or current circumstances.  Objective C: Patient will increase activity level by participating in at least two hours, three times per week in a leisure or social activity.     Progress toward Treatment Goals: Moderate improvement    Plan:  - Continue Individual therapy    Onesimo Rowan, Ph.D.  7/2/2020

## 2020-07-06 ENCOUNTER — TELEMEDICINE (OUTPATIENT)
Dept: BEHAVIORAL HEALTH | Facility: CLINIC | Age: 65
End: 2020-07-06
Payer: COMMERCIAL

## 2020-07-06 VITALS — BODY MASS INDEX: 29.06 KG/M2 | HEIGHT: 63 IN | WEIGHT: 164 LBS

## 2020-07-06 DIAGNOSIS — F33.0 MILD EPISODE OF RECURRENT MAJOR DEPRESSIVE DISORDER (HCC): ICD-10-CM

## 2020-07-06 DIAGNOSIS — F51.04 PSYCHOPHYSIOLOGICAL INSOMNIA: ICD-10-CM

## 2020-07-06 DIAGNOSIS — F41.1 GENERALIZED ANXIETY DISORDER: ICD-10-CM

## 2020-07-06 DIAGNOSIS — F33.1 MODERATE EPISODE OF RECURRENT MAJOR DEPRESSIVE DISORDER (HCC): ICD-10-CM

## 2020-07-06 PROCEDURE — 99213 OFFICE O/P EST LOW 20 MIN: CPT | Mod: 95,CR | Performed by: PSYCHIATRY & NEUROLOGY

## 2020-07-06 PROCEDURE — 90833 PSYTX W PT W E/M 30 MIN: CPT | Mod: 95,CR | Performed by: PSYCHIATRY & NEUROLOGY

## 2020-07-06 RX ORDER — BUSPIRONE HYDROCHLORIDE 15 MG/1
15 TABLET ORAL 2 TIMES DAILY
Qty: 60 TAB | Refills: 2 | Status: SHIPPED | OUTPATIENT
Start: 2020-07-06 | End: 2020-09-08 | Stop reason: SDUPTHER

## 2020-07-06 RX ORDER — MIRTAZAPINE 7.5 MG/1
7.5 TABLET, FILM COATED ORAL NIGHTLY PRN
Qty: 90 TAB | Refills: 0 | Status: SHIPPED
Start: 2020-07-06 | End: 2020-09-08

## 2020-07-06 RX ORDER — ESCITALOPRAM OXALATE 20 MG/1
20 TABLET ORAL DAILY
Qty: 90 TAB | Refills: 0 | Status: SHIPPED | OUTPATIENT
Start: 2020-07-06 | End: 2020-09-08 | Stop reason: SDUPTHER

## 2020-07-06 ASSESSMENT — PATIENT HEALTH QUESTIONNAIRE - PHQ9
CLINICAL INTERPRETATION OF PHQ2 SCORE: 2
5. POOR APPETITE OR OVEREATING: 2 - MORE THAN HALF THE DAYS
SUM OF ALL RESPONSES TO PHQ QUESTIONS 1-9: 9

## 2020-07-06 ASSESSMENT — FIBROSIS 4 INDEX: FIB4 SCORE: 0.69
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
## 2020-07-23 ENCOUNTER — TELEMEDICINE (OUTPATIENT)
Dept: BEHAVIORAL HEALTH | Facility: CLINIC | Age: 65
End: 2020-07-23
Payer: COMMERCIAL

## 2020-07-23 DIAGNOSIS — F33.0 MILD EPISODE OF RECURRENT MAJOR DEPRESSIVE DISORDER (HCC): ICD-10-CM

## 2020-07-23 DIAGNOSIS — F51.04 PSYCHOPHYSIOLOGICAL INSOMNIA: ICD-10-CM

## 2020-07-23 DIAGNOSIS — F41.1 GENERALIZED ANXIETY DISORDER: ICD-10-CM

## 2020-07-23 PROCEDURE — 90834 PSYTX W PT 45 MINUTES: CPT | Mod: 95,CR | Performed by: PSYCHOLOGIST

## 2020-07-23 NOTE — BH THERAPY
[facility]  Therapy Progress Note    Patient Name: Lizy eMrlos  Patient MRN: [id number]  Today's Date: 7/23/2020     Type of session:Individual psychotherapy  Length of session: 45 minutes  Persons in attendance:Patient     This encounter was conducted via Zoom.   Verbal consent was obtained. Patient's identity was verified.    Subjective/New Info:   Lizy Merlos is a 65 year old white female referred for assessment and treatment by Darian Pathak M.D. (psychiatrist). Patient reported that her psychotropic medications have been changed and she is feeling much better. She and her  have been getting along much better. She has been not responding appropriately and not getting sucked into arguments. Patient did present as more optimistic and positive. Talked with patient about some of the changes that she has been making. She stated that she has been much more conscious about how she feels about herself. She has been much more focused on the positive things in her life. She has been reading When Panic Attacks and is finding it very helpful.     Patient did not present in acute distress. Patient was appropriately groomed and cooperative. Patient was alert and oriented to person, place, and time. Eye contact was appropriate. No abnormalities in attention or concentration were noted. No abnormalities of movement present; psychomotor activity was normal. Speech was fluent and regular in rhythm, rate, volume, and tone. Thought processes were linear, logical, and goal-directed. There was no evidence of thought disorder. No auditory or visual hallucinations. Long and short term memory appeared to be intact. Insight, judgment, and impulse control were deemed to be within normal limits. Reported mood continues to improve, and she appears quite motivated to make changes. Affect was appropriate and congruent with thought content and conversation. Patient denied current suicidal and homicidal  ideation or plan, intent, and preparatory behavior.     Objective/Observations:              Participation: Active verbal participation, Attentive, Engaged and Open to feedback              Grooming: Casual and Neat              Cognition: Alert and Fully Oriented              Eye contact: Good              Mood: Depressed and Anxious              Affect: Flexible and Congruent with content              Thought process: Logical and Goal-directed              Speech: Rate within normal limits and Volume within normal limits              Other:        Diagnoses:   1. Mild episode of recurrent major depressive disorder (HCC)    2. Generalized anxiety disorder    3. Psychophysiological insomnia         Current risk:   SUICIDE: Low   Homicide: Low   Self-harm: Low   Relapse: Not applicable   Other:    Safety Plan reviewed? Not Indicated   If evidence of imminent risk is present, intervention/plan:     Therapeutic Intervention(s): Cognitive modification, Distress tolerance skills, Leisure and recreation skills, Stressors assessed and Supportive psychotherapy    Treatment Goal(s)/Objective(s) addressed:    Reduce symptoms of Depression:  Objective A: Patient will increase activity level by will participating in at least two hours, three times per week in a social or leisure activity with family member or close friend.  Objective B: Patient will express an increase in positive statements about self by  will making at least five positive statements per day about self circumstances.  Objective C: Patient will spend more time with friend in social situations by  spending at least thirty minutes four times per week in a social situation, interacting appropriately with a friend.     Reduce Symptoms of Anxiety:  Objective A: Patient will learn one effective technique for dealing with anxiety each week, and utilize it effectively when  feeling anxious.  Objective B: Patient will express an increase in positive statements by making  at least five positive statements per day about self or current circumstances.  Objective C: Patient will increase activity level by participating in at least two hours, three times per week in a leisure or social activity.        Progress toward Treatment Goals: Moderate improvement    Plan:  - Continue Individual therapy    Onesimo Rowan, Ph.D.  7/23/2020

## 2020-09-08 ENCOUNTER — TELEMEDICINE (OUTPATIENT)
Dept: BEHAVIORAL HEALTH | Facility: CLINIC | Age: 65
End: 2020-09-08
Payer: COMMERCIAL

## 2020-09-08 VITALS — HEIGHT: 63 IN | WEIGHT: 164 LBS | BODY MASS INDEX: 29.06 KG/M2

## 2020-09-08 DIAGNOSIS — F33.0 MILD EPISODE OF RECURRENT MAJOR DEPRESSIVE DISORDER (HCC): ICD-10-CM

## 2020-09-08 DIAGNOSIS — F41.1 GENERALIZED ANXIETY DISORDER: ICD-10-CM

## 2020-09-08 PROCEDURE — 90833 PSYTX W PT W E/M 30 MIN: CPT | Mod: 95,CR | Performed by: PSYCHIATRY & NEUROLOGY

## 2020-09-08 PROCEDURE — 99213 OFFICE O/P EST LOW 20 MIN: CPT | Mod: 95,CR | Performed by: PSYCHIATRY & NEUROLOGY

## 2020-09-08 RX ORDER — ESCITALOPRAM OXALATE 20 MG/1
20 TABLET ORAL DAILY
Qty: 90 TAB | Refills: 1 | Status: SHIPPED | OUTPATIENT
Start: 2020-09-08 | End: 2020-12-08 | Stop reason: SDUPTHER

## 2020-09-08 RX ORDER — BUSPIRONE HYDROCHLORIDE 15 MG/1
15 TABLET ORAL 2 TIMES DAILY
Qty: 180 TAB | Refills: 1 | Status: SHIPPED | OUTPATIENT
Start: 2020-09-08 | End: 2020-12-07

## 2020-09-08 ASSESSMENT — FIBROSIS 4 INDEX: FIB4 SCORE: 0.69
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
## 2020-09-10 ENCOUNTER — TELEMEDICINE (OUTPATIENT)
Dept: MEDICAL GROUP | Age: 65
End: 2020-09-10
Payer: COMMERCIAL

## 2020-09-10 VITALS — WEIGHT: 157 LBS | BODY MASS INDEX: 27.82 KG/M2 | HEIGHT: 63 IN

## 2020-09-10 DIAGNOSIS — G47.33 OSA ON CPAP: ICD-10-CM

## 2020-09-10 DIAGNOSIS — F41.8 DEPRESSION WITH ANXIETY: ICD-10-CM

## 2020-09-10 DIAGNOSIS — Z00.00 PE (PHYSICAL EXAM), ANNUAL: Primary | ICD-10-CM

## 2020-09-10 DIAGNOSIS — M54.42 CHRONIC BILATERAL LOW BACK PAIN WITH BILATERAL SCIATICA: ICD-10-CM

## 2020-09-10 DIAGNOSIS — M18.0 OSTEOARTHRITIS OF CARPOMETACARPAL (CMC) JOINT OF BOTH THUMBS: ICD-10-CM

## 2020-09-10 DIAGNOSIS — M76.61 ACHILLES TENDINITIS OF RIGHT LOWER EXTREMITY: ICD-10-CM

## 2020-09-10 DIAGNOSIS — M54.12 CERVICAL RADICULOPATHY: ICD-10-CM

## 2020-09-10 DIAGNOSIS — M54.41 CHRONIC BILATERAL LOW BACK PAIN WITH BILATERAL SCIATICA: ICD-10-CM

## 2020-09-10 DIAGNOSIS — E78.00 HYPERCHOLESTEROLEMIA: ICD-10-CM

## 2020-09-10 DIAGNOSIS — G89.29 CHRONIC BILATERAL LOW BACK PAIN WITH BILATERAL SCIATICA: ICD-10-CM

## 2020-09-10 PROBLEM — F41.1 GENERALIZED ANXIETY DISORDER: Status: RESOLVED | Noted: 2020-05-01 | Resolved: 2020-09-10

## 2020-09-10 PROBLEM — F33.0 MILD EPISODE OF RECURRENT MAJOR DEPRESSIVE DISORDER (HCC): Status: RESOLVED | Noted: 2020-05-01 | Resolved: 2020-09-10

## 2020-09-10 PROCEDURE — 99214 OFFICE O/P EST MOD 30 MIN: CPT | Mod: 25,95,CR | Performed by: FAMILY MEDICINE

## 2020-09-10 PROCEDURE — 99397 PER PM REEVAL EST PAT 65+ YR: CPT | Mod: 95,CR | Performed by: FAMILY MEDICINE

## 2020-09-10 ASSESSMENT — PAIN SCALES - GENERAL: PAINLEVEL: 8=MODERATE-SEVERE PAIN

## 2020-09-10 ASSESSMENT — FIBROSIS 4 INDEX: FIB4 SCORE: 0.69

## 2020-09-11 NOTE — PROGRESS NOTES
Virtual Visit: Established Patient   This visit was conducted via Zoom using secure and encrypted videoconferencing technology. The patient was in a private location in the state of [place].    The patient's identity was confirmed and verbal consent was obtained for this virtual visit.    Subjective:   CC: Annual physical    Lizy Merlos is a 65 y.o. female chronic bilateral low back pain with bilateral sciatica.  [facility]  However, she would like to be referred to a different neurosurgeon due to perceived poor service with spine [place].    Patient also has history of depression with anxiety, which is under good control with Lexapro 20 mg 15 daily and BuSpar 50 mg twice daily.  This condition is currently being managed by psychiatry.  Patient does not work with behavioral health.    For hyperlipidemia, she is taking 40 mg of simvastatin daily.  She tolerated medication well, no side effect reported.    She is compliant with CPAP for sleep apnea.  She has consistent follow-up with sleep medicine.  She denies any active daytime symptoms.    She has history of chronic CMC osteoarthritis bilaterally.  She was previously working with Dr. Remy.  She had several steroid injections with short-term relief her symptoms.  She continues to have pain and swelling at these joints intermittently.  She does regular hand exercises as directed.  She takes Tylenol or NSAID over-the-counter as needed for pain.  She no longer interested in following up with hand surgeon at this point.  Symptoms do not interfere with her daily activity.    Acute concerns:  Patient complains of recent development of neck pain with numbness tingling radiating to bilateral upper extremities.  She denies history of neck trauma or history of neck surgery.  She currently does not take any medication for this condition.  Nothing makes her symptoms worse or better.  Neck range of motion is not limited because of  symptoms.    Patient also states that she was diagnosed with Achilles tendinitis of the right lower extremity in the past.  She states that she had a large bone spur in this area.  Surgical intervention was recommended by her previous podiatrist.  However, she lost follow-up with them.  She complains of intermittent pain in this area.  She required new referral to podiatry.    Patient is active, but does not exercise regularly due to chronic osteoarthritic pain.  She denies history of drugs, alcohol, tobacco abuse.      ROS   Denies any recent fevers or chills. No nausea or vomiting. No chest pains or shortness of breath.     No Known Allergies    Current medicines (including changes today)  Current Outpatient Medications   Medication Sig Dispense Refill   • busPIRone (BUSPAR) 15 MG tablet Take 1 Tab by mouth 2 times a day for 90 days. 180 Tab 1   • escitalopram (LEXAPRO) 20 MG tablet Take 1 Tab by mouth every day. 90 Tab 1   • simvastatin (ZOCOR) 40 MG Tab Take 1 Tab by mouth every evening. 90 Tab 1   • Omega-3 Fatty Acids (FISH OIL) 1000 MG Cap capsule Take 1,000 mg by mouth every day.     • cholecalciferol (D-3-5) 5000 UNIT Cap Take 5,000 Units by mouth every day.     • Calcium Acetate, Phos Binder, (CALCIUM ACETATE PO) Take 6,700 mg by mouth.     • TURMERIC CURCUMIN PO Take  by mouth.     • LECITHIN PO Take  by mouth.     • Cyanocobalamin (B-12 PO) Take  by mouth.     • acetaminophen (TYLENOL) 500 MG Tab Take 500-1,000 mg by mouth every 6 hours as needed.     • Non Formulary Request Indications: immune drops (clove, cinnamon, lemon, orange, oregano, mandarin, thyme, rosemary, nutmeg, ginger, eucalyptus, citriodora) and lemon and peppermint oils       No current facility-administered medications for this visit.        Patient Active Problem List    Diagnosis Date Noted   • Osteoarthritis of carpometacarpal (CMC) joint of both thumbs_Dr. Remy  03/27/2020   • Hx of SCC_R temporal_removed in 3/2018 in [place]  "03/27/2020   • Heart murmur 03/27/2020   • PRAVEEN on CPAP 11/20/2019   • Depression with Anxiety-Dr. Pathak 07/23/2019   • Hypercholesterolemia 07/23/2019   • Chronic bilateral low back pain with bilateral sciatica 07/23/2019       Family History   Problem Relation Age of Onset   • Dementia Mother    • Heart Disease Mother         pacemaker   • Heart Disease Father         congestive heart failure   • Alcohol/Drug Paternal Uncle    • Dementia Maternal Grandmother    • Depression Maternal Grandmother    • Alcohol/Drug Maternal Grandfather    • Stroke Paternal Grandmother    • Heart Attack Paternal Grandfather        She  has a past medical history of Alcohol abuse, Anxiety disorder (09/2019), Arthritis (09/2019), Depression (09/2019), High cholesterol, Liver disease, Restless leg syndrome, and Snoring.  She  has a past surgical history that includes shoulder surgery (Left); cervical disk and fusion anterior (1993 and 1994); pr shldr arthroscop,surg,w/rotat cuff repr (Left, 10/1/2019); pr shldr arthroscop,part acromioplas (Left, 10/1/2019); pr arthroscopy shoulder surgical biceps tenodes* (Left, 10/1/2019); tonsillectomy; sinuscope; and primary c section.       Objective:   Ht 1.6 m (5' 3\")   Wt 71.2 kg (157 lb)   LMP  (LMP Unknown)   BMI 27.81 kg/m²     Physical Exam:  Constitutional: Alert, no distress, well-groomed.  Skin: No rashes in visible areas.  Eye: Round. Conjunctiva clear, lids normal. No icterus.   ENMT: Lips pink without lesions, good dentition, moist mucous membranes. Phonation normal.  Neck: No masses, no thyromegaly. Moves freely without pain.  Respiratory: Unlabored respiratory effort, no cough or audible wheeze  Psych: Alert and oriented x3, normal affect and mood.       Assessment and Plan:   The following treatment plan was discussed:     1. Chronic bilateral low back pain with bilateral sciatica  - REFERRAL TO NEUROSURGERY    2. Depression with anxiety  Chronic, controlled with Lexapro 20 mg daily " and BuSpar 15 mg twice daily.  This condition is currently being and managed by psychiatry.  She does not work with behavioral counseling.  She denies suicidal ideation or plans.  Overall, she states that her mental health is under good control.  -Continue BuSpar 15 mg twice daily and Lexapro 10 mg daily  - Appropriate counseling provided. Topics might include: regular exercise, well-balanced diet, multi-vitamin daily, weight loss, adequate sleep, meditation, stress management, avoidance of excessive consumption of caffeine, alcohol, illicit drugs, tobacco.    -Follow-up with psychiatry as directed.    3. Hypercholesterolemia  Chronic, controlled with Zocor 40 mg qd, no s/e reported, will continue.    - recommended dietary modification, exercise, and weight loss.   - avoid alcohol, drugs, tobacco products     4. PRAVEEN on CPAP  Chronic, compliant with CPAP, has consistent follow-up with sleep medicine.    Denies any daytime symptoms.  -Continue CPAP  -Follow-up with sleep medicine as directed    5. Osteoarthritis of carpometacarpal (CMC) joint of both thumbs_Dr. Jonel Denton, status post several injection by Dr. Remy.  Symptoms are manageable with as needed over-the-counter analgesics and regular exercise.    6. PE (physical exam), annual  General counseling provided, topics might include: diet, exercise, vitamin supplement, mental health, sleep, stress management, pap, mammogram, colonoscopy and vaccine recommendations.        7. Cervical radiculopathy  History is concerning for cervical radiculopathy.  - REFERRAL TO NEUROSURGERY    8. Achilles tendinitis of right lower extremity  - REFERRAL TO PODIATRY         Follow-up: Return in about 6 months (around 3/10/2021) for Multiple issues.

## 2020-09-24 ENCOUNTER — TELEMEDICINE (OUTPATIENT)
Dept: MEDICAL GROUP | Age: 65
End: 2020-09-24
Payer: COMMERCIAL

## 2020-09-24 VITALS — WEIGHT: 150 LBS | HEIGHT: 63 IN | BODY MASS INDEX: 26.58 KG/M2

## 2020-09-24 DIAGNOSIS — M54.42 CHRONIC BILATERAL LOW BACK PAIN WITH BILATERAL SCIATICA: ICD-10-CM

## 2020-09-24 DIAGNOSIS — G89.29 CHRONIC BILATERAL LOW BACK PAIN WITH BILATERAL SCIATICA: ICD-10-CM

## 2020-09-24 DIAGNOSIS — M76.61 ACHILLES TENDINITIS OF RIGHT LOWER EXTREMITY: ICD-10-CM

## 2020-09-24 DIAGNOSIS — M54.41 CHRONIC BILATERAL LOW BACK PAIN WITH BILATERAL SCIATICA: ICD-10-CM

## 2020-09-24 DIAGNOSIS — M54.12 CERVICAL RADICULOPATHY: Primary | ICD-10-CM

## 2020-09-24 PROCEDURE — 99214 OFFICE O/P EST MOD 30 MIN: CPT | Mod: 95,CR | Performed by: FAMILY MEDICINE

## 2020-09-24 RX ORDER — PREGABALIN 50 MG/1
50 CAPSULE ORAL 2 TIMES DAILY
Qty: 60 CAP | Refills: 0 | Status: SHIPPED | OUTPATIENT
Start: 2020-09-24 | End: 2020-10-22

## 2020-09-24 RX ORDER — TRAMADOL HYDROCHLORIDE 50 MG/1
50 TABLET ORAL EVERY 8 HOURS PRN
Qty: 21 TAB | Refills: 0 | Status: SHIPPED | OUTPATIENT
Start: 2020-09-24 | End: 2020-10-01

## 2020-09-24 RX ORDER — MELOXICAM 15 MG/1
15 TABLET ORAL DAILY
COMMUNITY
End: 2020-12-11

## 2020-09-24 ASSESSMENT — FIBROSIS 4 INDEX: FIB4 SCORE: 0.69
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
## 2020-10-15 LAB
ALBUMIN SERPL-MCNC: 4.6 G/DL (ref 3.8–4.8)
ALBUMIN/GLOB SERPL: 2.3 {RATIO} (ref 1.2–2.2)
ALP SERPL-CCNC: 79 IU/L (ref 39–117)
ALT SERPL-CCNC: 21 IU/L (ref 0–32)
AST SERPL-CCNC: 19 IU/L (ref 0–40)
BASOPHILS # BLD AUTO: 0.1 X10E3/UL (ref 0–0.2)
BASOPHILS NFR BLD AUTO: 1 %
BILIRUB SERPL-MCNC: 0.5 MG/DL (ref 0–1.2)
BUN SERPL-MCNC: 10 MG/DL (ref 8–27)
BUN/CREAT SERPL: 11 (ref 12–28)
CALCIUM SERPL-MCNC: 10.3 MG/DL (ref 8.7–10.3)
CHLORIDE SERPL-SCNC: 106 MMOL/L (ref 96–106)
CHOLEST SERPL-MCNC: 153 MG/DL (ref 100–199)
CO2 SERPL-SCNC: 23 MMOL/L (ref 20–29)
CREAT SERPL-MCNC: 0.89 MG/DL (ref 0.57–1)
EOSINOPHIL # BLD AUTO: 0.4 X10E3/UL (ref 0–0.4)
EOSINOPHIL NFR BLD AUTO: 5 %
ERYTHROCYTE [DISTWIDTH] IN BLOOD BY AUTOMATED COUNT: 12.7 % (ref 11.7–15.4)
GLOBULIN SER CALC-MCNC: 2 G/DL (ref 1.5–4.5)
GLUCOSE SERPL-MCNC: 99 MG/DL (ref 65–99)
HBA1C MFR BLD: 5.7 % (ref 4.8–5.6)
HCT VFR BLD AUTO: 39.7 % (ref 34–46.6)
HDLC SERPL-MCNC: 55 MG/DL
HGB BLD-MCNC: 13.7 G/DL (ref 11.1–15.9)
IMM GRANULOCYTES # BLD AUTO: 0 X10E3/UL (ref 0–0.1)
IMM GRANULOCYTES NFR BLD AUTO: 0 %
IMMATURE CELLS  115398: NORMAL
LABORATORY COMMENT REPORT: NORMAL
LDLC SERPL CALC-MCNC: 75 MG/DL (ref 0–99)
LYMPHOCYTES # BLD AUTO: 1.5 X10E3/UL (ref 0.7–3.1)
LYMPHOCYTES NFR BLD AUTO: 22 %
MCH RBC QN AUTO: 31.1 PG (ref 26.6–33)
MCHC RBC AUTO-ENTMCNC: 34.5 G/DL (ref 31.5–35.7)
MCV RBC AUTO: 90 FL (ref 79–97)
MONOCYTES # BLD AUTO: 0.5 X10E3/UL (ref 0.1–0.9)
MONOCYTES NFR BLD AUTO: 7 %
MORPHOLOGY BLD-IMP: NORMAL
NEUTROPHILS # BLD AUTO: 4.4 X10E3/UL (ref 1.4–7)
NEUTROPHILS NFR BLD AUTO: 65 %
NRBC BLD AUTO-RTO: NORMAL %
PLATELET # BLD AUTO: 405 X10E3/UL (ref 150–450)
POTASSIUM SERPL-SCNC: 4.5 MMOL/L (ref 3.5–5.2)
PROT SERPL-MCNC: 6.6 G/DL (ref 6–8.5)
RBC # BLD AUTO: 4.41 X10E6/UL (ref 3.77–5.28)
SODIUM SERPL-SCNC: 142 MMOL/L (ref 134–144)
TRIGL SERPL-MCNC: 130 MG/DL (ref 0–149)
VLDLC SERPL CALC-MCNC: 23 MG/DL (ref 5–40)
WBC # BLD AUTO: 6.8 X10E3/UL (ref 3.4–10.8)

## 2020-10-16 DIAGNOSIS — Z00.00 PE (PHYSICAL EXAM), ANNUAL: ICD-10-CM

## 2020-10-22 DIAGNOSIS — G89.29 CHRONIC BILATERAL LOW BACK PAIN WITH BILATERAL SCIATICA: ICD-10-CM

## 2020-10-22 DIAGNOSIS — M54.42 CHRONIC BILATERAL LOW BACK PAIN WITH BILATERAL SCIATICA: ICD-10-CM

## 2020-10-22 DIAGNOSIS — M54.41 CHRONIC BILATERAL LOW BACK PAIN WITH BILATERAL SCIATICA: ICD-10-CM

## 2020-10-22 DIAGNOSIS — M54.12 CERVICAL RADICULOPATHY: ICD-10-CM

## 2020-10-22 RX ORDER — PREGABALIN 50 MG/1
CAPSULE ORAL
Qty: 60 CAP | Refills: 0 | Status: SHIPPED | OUTPATIENT
Start: 2020-10-22 | End: 2020-11-20

## 2020-11-03 ENCOUNTER — TELEMEDICINE (OUTPATIENT)
Dept: MEDICAL GROUP | Age: 65
End: 2020-11-03
Payer: COMMERCIAL

## 2020-11-03 VITALS
HEIGHT: 63 IN | DIASTOLIC BLOOD PRESSURE: 77 MMHG | BODY MASS INDEX: 28 KG/M2 | WEIGHT: 158 LBS | SYSTOLIC BLOOD PRESSURE: 120 MMHG

## 2020-11-03 DIAGNOSIS — Z01.818 PREOPERATIVE CLEARANCE: ICD-10-CM

## 2020-11-03 DIAGNOSIS — J38.00 VOCAL CORD PARALYSIS: ICD-10-CM

## 2020-11-03 DIAGNOSIS — M54.12 CERVICAL RADICULOPATHY: ICD-10-CM

## 2020-11-03 DIAGNOSIS — M54.16 LUMBAR RADICULOPATHY, CHRONIC: ICD-10-CM

## 2020-11-03 PROCEDURE — 93000 ELECTROCARDIOGRAM COMPLETE: CPT | Performed by: FAMILY MEDICINE

## 2020-11-03 PROCEDURE — 99215 OFFICE O/P EST HI 40 MIN: CPT | Mod: 25 | Performed by: FAMILY MEDICINE

## 2020-11-03 RX ORDER — TRAMADOL HYDROCHLORIDE 50 MG/1
50 TABLET ORAL EVERY 8 HOURS PRN
Qty: 90 TAB | Refills: 0 | Status: SHIPPED | OUTPATIENT
Start: 2020-11-03 | End: 2020-12-03 | Stop reason: SDUPTHER

## 2020-11-03 ASSESSMENT — FIBROSIS 4 INDEX: FIB4 SCORE: 0.67

## 2020-11-03 NOTE — Clinical Note
Please print the letter generated on 11/4/2020 for me to sign. Please fax to Dr. Rincon's office. Please send the letter to pt's home address after faxing.   Norma Holguin M.D.
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
## 2020-11-03 NOTE — LETTER
November 4, 2020        RE: Lizy Nat Merlos      Dear Dr. Rincon,     My name is Norma Holguin MD. I am taking care of Lizy Merlos, who is suffering chronic neck and back pain. She has planned surgery with you in near future. I saw patient in the clinic on 11/3/2020 for pre-op evaluation. Pt is cleared for surgery after resolution of residue Covid symptoms.     If you have any questions, please do not hesitate to call my office.     Best regards,                 Norma Holguin M.D.

## 2020-11-18 DIAGNOSIS — E78.00 HYPERCHOLESTEROLEMIA: ICD-10-CM

## 2020-11-18 RX ORDER — SIMVASTATIN 40 MG
TABLET ORAL
Qty: 90 TAB | Refills: 1 | Status: SHIPPED | OUTPATIENT
Start: 2020-11-18 | End: 2021-05-24

## 2020-11-19 DIAGNOSIS — G89.29 CHRONIC BILATERAL LOW BACK PAIN WITH BILATERAL SCIATICA: ICD-10-CM

## 2020-11-19 DIAGNOSIS — M54.41 CHRONIC BILATERAL LOW BACK PAIN WITH BILATERAL SCIATICA: ICD-10-CM

## 2020-11-19 DIAGNOSIS — M54.12 CERVICAL RADICULOPATHY: ICD-10-CM

## 2020-11-19 DIAGNOSIS — M54.42 CHRONIC BILATERAL LOW BACK PAIN WITH BILATERAL SCIATICA: ICD-10-CM

## 2020-11-20 RX ORDER — PREGABALIN 50 MG/1
CAPSULE ORAL
Qty: 60 CAP | Refills: 0 | Status: SHIPPED | OUTPATIENT
Start: 2020-11-20 | End: 2020-11-24 | Stop reason: SDUPTHER

## 2020-11-24 ENCOUNTER — TELEPHONE (OUTPATIENT)
Dept: MEDICAL GROUP | Age: 65
End: 2020-11-24

## 2020-11-24 DIAGNOSIS — M54.42 CHRONIC BILATERAL LOW BACK PAIN WITH BILATERAL SCIATICA: ICD-10-CM

## 2020-11-24 DIAGNOSIS — M54.41 CHRONIC BILATERAL LOW BACK PAIN WITH BILATERAL SCIATICA: ICD-10-CM

## 2020-11-24 DIAGNOSIS — M54.12 CERVICAL RADICULOPATHY: ICD-10-CM

## 2020-11-24 DIAGNOSIS — G89.29 CHRONIC BILATERAL LOW BACK PAIN WITH BILATERAL SCIATICA: ICD-10-CM

## 2020-11-24 RX ORDER — PREGABALIN 50 MG/1
50 CAPSULE ORAL 2 TIMES DAILY
Qty: 60 CAP | Refills: 0 | Status: SHIPPED | OUTPATIENT
Start: 2020-11-24 | End: 2021-01-06 | Stop reason: SDUPTHER

## 2020-11-24 NOTE — TELEPHONE ENCOUNTER
Phone Number Called: [phone]    Call outcome: Spoke to patient regarding message below.    Message: Patient informed that her RX was revised and sent.

## 2020-11-24 NOTE — TELEPHONE ENCOUNTER
Phone Number Called: [facility]    Call outcome: recieved fax    Message: pharmacy is needing the day supply for the pregabalin 50 mg

## 2020-12-03 ENCOUNTER — TELEMEDICINE (OUTPATIENT)
Dept: MEDICAL GROUP | Age: 65
End: 2020-12-03
Payer: COMMERCIAL

## 2020-12-03 VITALS
BODY MASS INDEX: 27.64 KG/M2 | HEIGHT: 63 IN | WEIGHT: 156 LBS | SYSTOLIC BLOOD PRESSURE: 135 MMHG | HEART RATE: 60 BPM | DIASTOLIC BLOOD PRESSURE: 76 MMHG

## 2020-12-03 DIAGNOSIS — J38.3 VOCAL CORD DYSFUNCTION: ICD-10-CM

## 2020-12-03 DIAGNOSIS — M54.12 CERVICAL RADICULOPATHY: Primary | ICD-10-CM

## 2020-12-03 PROCEDURE — 99214 OFFICE O/P EST MOD 30 MIN: CPT | Mod: 95,CR | Performed by: FAMILY MEDICINE

## 2020-12-03 RX ORDER — TRAMADOL HYDROCHLORIDE 50 MG/1
50 TABLET ORAL EVERY 8 HOURS PRN
Qty: 90 TAB | Refills: 0 | Status: ON HOLD
Start: 2020-12-03 | End: 2020-12-18

## 2020-12-03 ASSESSMENT — FIBROSIS 4 INDEX: FIB4 SCORE: 0.67
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
## 2020-12-08 ENCOUNTER — TELEMEDICINE (OUTPATIENT)
Dept: BEHAVIORAL HEALTH | Facility: CLINIC | Age: 65
End: 2020-12-08
Payer: COMMERCIAL

## 2020-12-08 DIAGNOSIS — F41.1 GAD (GENERALIZED ANXIETY DISORDER): ICD-10-CM

## 2020-12-08 DIAGNOSIS — F33.41 RECURRENT MAJOR DEPRESSIVE DISORDER, IN PARTIAL REMISSION (HCC): ICD-10-CM

## 2020-12-08 DIAGNOSIS — F33.0 MILD EPISODE OF RECURRENT MAJOR DEPRESSIVE DISORDER (HCC): ICD-10-CM

## 2020-12-08 DIAGNOSIS — F41.1 GENERALIZED ANXIETY DISORDER: ICD-10-CM

## 2020-12-08 PROCEDURE — 99213 OFFICE O/P EST LOW 20 MIN: CPT | Mod: 95,CR | Performed by: PSYCHIATRY & NEUROLOGY

## 2020-12-08 RX ORDER — ESCITALOPRAM OXALATE 20 MG/1
20 TABLET ORAL DAILY
Qty: 90 TAB | Refills: 1 | Status: SHIPPED | OUTPATIENT
Start: 2020-12-08 | End: 2021-04-21 | Stop reason: SDUPTHER

## 2020-12-08 RX ORDER — BUSPIRONE HYDROCHLORIDE 15 MG/1
15 TABLET ORAL 2 TIMES DAILY
Qty: 180 TAB | Refills: 1 | Status: SHIPPED | OUTPATIENT
Start: 2020-12-08 | End: 2021-04-21 | Stop reason: SDUPTHER
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
## 2020-12-11 ENCOUNTER — HOSPITAL ENCOUNTER (OUTPATIENT)
Dept: RADIOLOGY | Facility: MEDICAL CENTER | Age: 65
DRG: 454 | End: 2020-12-11
Attending: NEUROLOGICAL SURGERY | Admitting: NEUROLOGICAL SURGERY
Payer: COMMERCIAL

## 2020-12-11 ENCOUNTER — PRE-ADMISSION TESTING (OUTPATIENT)
Dept: ADMISSIONS | Facility: MEDICAL CENTER | Age: 65
DRG: 454 | End: 2020-12-11
Attending: NEUROLOGICAL SURGERY
Payer: COMMERCIAL

## 2020-12-11 DIAGNOSIS — Z01.810 PRE-OPERATIVE CARDIOVASCULAR EXAMINATION: ICD-10-CM

## 2020-12-11 DIAGNOSIS — Z01.812 PRE-OPERATIVE LABORATORY EXAMINATION: ICD-10-CM

## 2020-12-11 DIAGNOSIS — Z01.811 PRE-OPERATIVE RESPIRATORY EXAMINATION: ICD-10-CM

## 2020-12-11 LAB
ABO GROUP BLD: NORMAL
ANION GAP SERPL CALC-SCNC: 10 MMOL/L (ref 7–16)
APTT PPP: 31 SEC (ref 24.7–36)
BASOPHILS # BLD AUTO: 0.9 % (ref 0–1.8)
BASOPHILS # BLD: 0.09 K/UL (ref 0–0.12)
BLD GP AB SCN SERPL QL: NORMAL
BUN SERPL-MCNC: 13 MG/DL (ref 8–22)
CALCIUM SERPL-MCNC: 10.2 MG/DL (ref 8.5–10.5)
CHLORIDE SERPL-SCNC: 105 MMOL/L (ref 96–112)
CO2 SERPL-SCNC: 22 MMOL/L (ref 20–33)
COVID ORDER STATUS COVID19: NORMAL
CREAT SERPL-MCNC: 0.72 MG/DL (ref 0.5–1.4)
EKG IMPRESSION: NORMAL
EOSINOPHIL # BLD AUTO: 0.15 K/UL (ref 0–0.51)
EOSINOPHIL NFR BLD: 1.5 % (ref 0–6.9)
ERYTHROCYTE [DISTWIDTH] IN BLOOD BY AUTOMATED COUNT: 41.9 FL (ref 35.9–50)
GLUCOSE SERPL-MCNC: 90 MG/DL (ref 65–99)
HCT VFR BLD AUTO: 42.3 % (ref 37–47)
HGB BLD-MCNC: 13.8 G/DL (ref 12–16)
IMM GRANULOCYTES # BLD AUTO: 0.05 K/UL (ref 0–0.11)
IMM GRANULOCYTES NFR BLD AUTO: 0.5 % (ref 0–0.9)
INR PPP: 0.98 (ref 0.87–1.13)
LYMPHOCYTES # BLD AUTO: 2.23 K/UL (ref 1–4.8)
LYMPHOCYTES NFR BLD: 21.8 % (ref 22–41)
MCH RBC QN AUTO: 30.1 PG (ref 27–33)
MCHC RBC AUTO-ENTMCNC: 32.6 G/DL (ref 33.6–35)
MCV RBC AUTO: 92.2 FL (ref 81.4–97.8)
MONOCYTES # BLD AUTO: 0.65 K/UL (ref 0–0.85)
MONOCYTES NFR BLD AUTO: 6.4 % (ref 0–13.4)
NEUTROPHILS # BLD AUTO: 7.06 K/UL (ref 2–7.15)
NEUTROPHILS NFR BLD: 68.9 % (ref 44–72)
NRBC # BLD AUTO: 0 K/UL
NRBC BLD-RTO: 0 /100 WBC
PLATELET # BLD AUTO: 423 K/UL (ref 164–446)
PMV BLD AUTO: 9.3 FL (ref 9–12.9)
POTASSIUM SERPL-SCNC: 4.3 MMOL/L (ref 3.6–5.5)
PROTHROMBIN TIME: 13.3 SEC (ref 12–14.6)
RBC # BLD AUTO: 4.59 M/UL (ref 4.2–5.4)
RH BLD: NORMAL
SARS-COV-2 RNA RESP QL NAA+PROBE: NOTDETECTED
SODIUM SERPL-SCNC: 137 MMOL/L (ref 135–145)
SPECIMEN SOURCE: NORMAL
WBC # BLD AUTO: 10.2 K/UL (ref 4.8–10.8)

## 2020-12-11 PROCEDURE — 86901 BLOOD TYPING SEROLOGIC RH(D): CPT

## 2020-12-11 PROCEDURE — 80048 BASIC METABOLIC PNL TOTAL CA: CPT

## 2020-12-11 PROCEDURE — 86850 RBC ANTIBODY SCREEN: CPT

## 2020-12-11 PROCEDURE — 71046 X-RAY EXAM CHEST 2 VIEWS: CPT

## 2020-12-11 PROCEDURE — 36415 COLL VENOUS BLD VENIPUNCTURE: CPT

## 2020-12-11 PROCEDURE — 85730 THROMBOPLASTIN TIME PARTIAL: CPT

## 2020-12-11 PROCEDURE — U0003 INFECTIOUS AGENT DETECTION BY NUCLEIC ACID (DNA OR RNA); SEVERE ACUTE RESPIRATORY SYNDROME CORONAVIRUS 2 (SARS-COV-2) (CORONAVIRUS DISEASE [COVID-19]), AMPLIFIED PROBE TECHNIQUE, MAKING USE OF HIGH THROUGHPUT TECHNOLOGIES AS DESCRIBED BY CMS-2020-01-R: HCPCS

## 2020-12-11 PROCEDURE — 93010 ELECTROCARDIOGRAM REPORT: CPT | Performed by: INTERNAL MEDICINE

## 2020-12-11 PROCEDURE — 85025 COMPLETE CBC W/AUTO DIFF WBC: CPT

## 2020-12-11 PROCEDURE — 86900 BLOOD TYPING SEROLOGIC ABO: CPT

## 2020-12-11 PROCEDURE — 93005 ELECTROCARDIOGRAM TRACING: CPT

## 2020-12-11 PROCEDURE — C9803 HOPD COVID-19 SPEC COLLECT: HCPCS

## 2020-12-11 PROCEDURE — 85610 PROTHROMBIN TIME: CPT

## 2020-12-11 ASSESSMENT — FIBROSIS 4 INDEX: FIB4 SCORE: 0.67

## 2020-12-17 ENCOUNTER — ANESTHESIA EVENT (OUTPATIENT)
Dept: SURGERY | Facility: MEDICAL CENTER | Age: 65
DRG: 454 | End: 2020-12-17
Payer: COMMERCIAL

## 2020-12-17 NOTE — PROGRESS NOTES
COVID-19 Pre-surgery screenin. Do you have an undiagnosed respiratory illness or symptoms such as coughing or sneezing? no        2. Do you have an unexplained fever greater than 100.4 degrees Fahrenheit or 38 degrees Celsius?     no    3. Have you had direct exposure to a patient who tested positive for Covid-19?    no    4. Have you had any loss of your sense of taste or smell? Have you had N/V or sore throat?no    Patient has been informed of visitor policy and asked to wear a mask upon entering the hospital   yes

## 2020-12-18 ENCOUNTER — ANESTHESIA (OUTPATIENT)
Dept: SURGERY | Facility: MEDICAL CENTER | Age: 65
DRG: 454 | End: 2020-12-18
Payer: COMMERCIAL

## 2020-12-18 ENCOUNTER — APPOINTMENT (OUTPATIENT)
Dept: RADIOLOGY | Facility: MEDICAL CENTER | Age: 65
DRG: 454 | End: 2020-12-18
Attending: NEUROLOGICAL SURGERY
Payer: COMMERCIAL

## 2020-12-18 ENCOUNTER — HOSPITAL ENCOUNTER (INPATIENT)
Facility: MEDICAL CENTER | Age: 65
LOS: 3 days | DRG: 454 | End: 2020-12-21
Attending: NEUROLOGICAL SURGERY | Admitting: NEUROLOGICAL SURGERY
Payer: COMMERCIAL

## 2020-12-18 DIAGNOSIS — M47.12 CERVICAL SPONDYLOSIS WITH MYELOPATHY: ICD-10-CM

## 2020-12-18 DIAGNOSIS — G89.18 POSTOPERATIVE PAIN: ICD-10-CM

## 2020-12-18 LAB
ABO + RH BLD: NORMAL
HCT VFR BLD AUTO: 36.6 % (ref 37–47)
HGB BLD-MCNC: 12.1 G/DL (ref 12–16)

## 2020-12-18 PROCEDURE — 95861 NEEDLE EMG 2 EXTREMITIES: CPT | Performed by: NEUROLOGICAL SURGERY

## 2020-12-18 PROCEDURE — 700102 HCHG RX REV CODE 250 W/ 637 OVERRIDE(OP): Performed by: STUDENT IN AN ORGANIZED HEALTH CARE EDUCATION/TRAINING PROGRAM

## 2020-12-18 PROCEDURE — A9270 NON-COVERED ITEM OR SERVICE: HCPCS | Performed by: PHYSICIAN ASSISTANT

## 2020-12-18 PROCEDURE — 500002 HCHG ADHESIVE, DERMABOND: Performed by: NEUROLOGICAL SURGERY

## 2020-12-18 PROCEDURE — 700111 HCHG RX REV CODE 636 W/ 250 OVERRIDE (IP): Performed by: STUDENT IN AN ORGANIZED HEALTH CARE EDUCATION/TRAINING PROGRAM

## 2020-12-18 PROCEDURE — 00QT0ZZ REPAIR SPINAL MENINGES, OPEN APPROACH: ICD-10-PCS | Performed by: NEUROLOGICAL SURGERY

## 2020-12-18 PROCEDURE — A9270 NON-COVERED ITEM OR SERVICE: HCPCS | Performed by: NEUROLOGICAL SURGERY

## 2020-12-18 PROCEDURE — 00NW0ZZ RELEASE CERVICAL SPINAL CORD, OPEN APPROACH: ICD-10-PCS | Performed by: NEUROLOGICAL SURGERY

## 2020-12-18 PROCEDURE — 700105 HCHG RX REV CODE 258: Performed by: STUDENT IN AN ORGANIZED HEALTH CARE EDUCATION/TRAINING PROGRAM

## 2020-12-18 PROCEDURE — 160002 HCHG RECOVERY MINUTES (STAT): Performed by: NEUROLOGICAL SURGERY

## 2020-12-18 PROCEDURE — 01N10ZZ RELEASE CERVICAL NERVE, OPEN APPROACH: ICD-10-PCS | Performed by: NEUROLOGICAL SURGERY

## 2020-12-18 PROCEDURE — 700111 HCHG RX REV CODE 636 W/ 250 OVERRIDE (IP): Performed by: PHYSICIAN ASSISTANT

## 2020-12-18 PROCEDURE — C1713 ANCHOR/SCREW BN/BN,TIS/BN: HCPCS | Performed by: NEUROLOGICAL SURGERY

## 2020-12-18 PROCEDURE — 500367 HCHG DRAIN KIT, HEMOVAC: Performed by: NEUROLOGICAL SURGERY

## 2020-12-18 PROCEDURE — 160035 HCHG PACU - 1ST 60 MINS PHASE I: Performed by: NEUROLOGICAL SURGERY

## 2020-12-18 PROCEDURE — 36415 COLL VENOUS BLD VENIPUNCTURE: CPT

## 2020-12-18 PROCEDURE — 72040 X-RAY EXAM NECK SPINE 2-3 VW: CPT

## 2020-12-18 PROCEDURE — 160031 HCHG SURGERY MINUTES - 1ST 30 MINS LEVEL 5: Performed by: NEUROLOGICAL SURGERY

## 2020-12-18 PROCEDURE — 95937 NEUROMUSCULAR JUNCTION TEST: CPT | Performed by: NEUROLOGICAL SURGERY

## 2020-12-18 PROCEDURE — L8699 PROSTHETIC IMPLANT NOS: HCPCS | Performed by: NEUROLOGICAL SURGERY

## 2020-12-18 PROCEDURE — 500112 HCHG BONE WAX: Performed by: NEUROLOGICAL SURGERY

## 2020-12-18 PROCEDURE — 160009 HCHG ANES TIME/MIN: Performed by: NEUROLOGICAL SURGERY

## 2020-12-18 PROCEDURE — 700101 HCHG RX REV CODE 250: Performed by: STUDENT IN AN ORGANIZED HEALTH CARE EDUCATION/TRAINING PROGRAM

## 2020-12-18 PROCEDURE — 502000 HCHG MISC OR IMPLANTS RC 0278: Performed by: NEUROLOGICAL SURGERY

## 2020-12-18 PROCEDURE — 700105 HCHG RX REV CODE 258: Performed by: NEUROLOGICAL SURGERY

## 2020-12-18 PROCEDURE — 160042 HCHG SURGERY MINUTES - EA ADDL 1 MIN LEVEL 5: Performed by: NEUROLOGICAL SURGERY

## 2020-12-18 PROCEDURE — 95938 SOMATOSENSORY TESTING: CPT | Performed by: NEUROLOGICAL SURGERY

## 2020-12-18 PROCEDURE — 0RG6071 FUSION OF THORACIC VERTEBRAL JOINT WITH AUTOLOGOUS TISSUE SUBSTITUTE, POSTERIOR APPROACH, POSTERIOR COLUMN, OPEN APPROACH: ICD-10-PCS | Performed by: NEUROLOGICAL SURGERY

## 2020-12-18 PROCEDURE — 110371 HCHG SHELL REV 272: Performed by: NEUROLOGICAL SURGERY

## 2020-12-18 PROCEDURE — 500864 HCHG NEEDLE, SPINAL 18G: Performed by: NEUROLOGICAL SURGERY

## 2020-12-18 PROCEDURE — A9270 NON-COVERED ITEM OR SERVICE: HCPCS | Performed by: STUDENT IN AN ORGANIZED HEALTH CARE EDUCATION/TRAINING PROGRAM

## 2020-12-18 PROCEDURE — 00NX0ZZ RELEASE THORACIC SPINAL CORD, OPEN APPROACH: ICD-10-PCS | Performed by: NEUROLOGICAL SURGERY

## 2020-12-18 PROCEDURE — 3E0U0GB INTRODUCTION OF RECOMBINANT BONE MORPHOGENETIC PROTEIN INTO JOINTS, OPEN APPROACH: ICD-10-PCS | Performed by: NEUROLOGICAL SURGERY

## 2020-12-18 PROCEDURE — 0RT30ZZ RESECTION OF CERVICAL VERTEBRAL DISC, OPEN APPROACH: ICD-10-PCS | Performed by: NEUROLOGICAL SURGERY

## 2020-12-18 PROCEDURE — 700101 HCHG RX REV CODE 250: Performed by: PHYSICIAN ASSISTANT

## 2020-12-18 PROCEDURE — 700101 HCHG RX REV CODE 250: Performed by: NEUROLOGICAL SURGERY

## 2020-12-18 PROCEDURE — 700105 HCHG RX REV CODE 258: Performed by: PHYSICIAN ASSISTANT

## 2020-12-18 PROCEDURE — 0RG4071 FUSION OF CERVICOTHORACIC VERTEBRAL JOINT WITH AUTOLOGOUS TISSUE SUBSTITUTE, POSTERIOR APPROACH, POSTERIOR COLUMN, OPEN APPROACH: ICD-10-PCS | Performed by: NEUROLOGICAL SURGERY

## 2020-12-18 PROCEDURE — 160036 HCHG PACU - EA ADDL 30 MINS PHASE I: Performed by: NEUROLOGICAL SURGERY

## 2020-12-18 PROCEDURE — 85018 HEMOGLOBIN: CPT

## 2020-12-18 PROCEDURE — 95939 C MOTOR EVOKED UPR&LWR LIMBS: CPT | Performed by: NEUROLOGICAL SURGERY

## 2020-12-18 PROCEDURE — 85014 HEMATOCRIT: CPT

## 2020-12-18 PROCEDURE — 700111 HCHG RX REV CODE 636 W/ 250 OVERRIDE (IP): Performed by: NEUROLOGICAL SURGERY

## 2020-12-18 PROCEDURE — 160048 HCHG OR STATISTICAL LEVEL 1-5: Performed by: NEUROLOGICAL SURGERY

## 2020-12-18 PROCEDURE — 95940 IONM IN OPERATNG ROOM 15 MIN: CPT | Performed by: NEUROLOGICAL SURGERY

## 2020-12-18 PROCEDURE — 110454 HCHG SHELL REV 250: Performed by: NEUROLOGICAL SURGERY

## 2020-12-18 PROCEDURE — 770006 HCHG ROOM/CARE - MED/SURG/GYN SEMI*

## 2020-12-18 PROCEDURE — 700102 HCHG RX REV CODE 250 W/ 637 OVERRIDE(OP): Performed by: PHYSICIAN ASSISTANT

## 2020-12-18 PROCEDURE — 4A11X4G MONITORING OF PERIPHERAL NERVOUS ELECTRICAL ACTIVITY, INTRAOPERATIVE, EXTERNAL APPROACH: ICD-10-PCS | Performed by: NEUROLOGICAL SURGERY

## 2020-12-18 PROCEDURE — 0RG20A0 FUSION OF 2 OR MORE CERVICAL VERTEBRAL JOINTS WITH INTERBODY FUSION DEVICE, ANTERIOR APPROACH, ANTERIOR COLUMN, OPEN APPROACH: ICD-10-PCS | Performed by: NEUROLOGICAL SURGERY

## 2020-12-18 PROCEDURE — 501838 HCHG SUTURE GENERAL: Performed by: NEUROLOGICAL SURGERY

## 2020-12-18 PROCEDURE — 0RG2071 FUSION OF 2 OR MORE CERVICAL VERTEBRAL JOINTS WITH AUTOLOGOUS TISSUE SUBSTITUTE, POSTERIOR APPROACH, POSTERIOR COLUMN, OPEN APPROACH: ICD-10-PCS | Performed by: NEUROLOGICAL SURGERY

## 2020-12-18 DEVICE — GRAPH BONE KIT INFUSE MEDIUM: Type: IMPLANTABLE DEVICE | Site: SPINE CERVICAL | Status: FUNCTIONAL

## 2020-12-18 DEVICE — ORTHOBLEND 5CC: Type: IMPLANTABLE DEVICE | Site: SPINE CERVICAL | Status: FUNCTIONAL

## 2020-12-18 DEVICE — STRUT CENTER CORNERSTONE PSR 14 X 11 X 6: Type: IMPLANTABLE DEVICE | Site: SPINE CERVICAL | Status: FUNCTIONAL

## 2020-12-18 DEVICE — GRAFT BONE MAGNIFUSE 1X10CM: Type: IMPLANTABLE DEVICE | Site: SPINE CERVICAL | Status: FUNCTIONAL

## 2020-12-18 DEVICE — IMPLANTABLE DEVICE: Type: IMPLANTABLE DEVICE | Site: SPINE CERVICAL | Status: FUNCTIONAL

## 2020-12-18 DEVICE — CAGE CORNERSTONE PSR 14 X 11 X 9: Type: IMPLANTABLE DEVICE | Site: SPINE CERVICAL | Status: FUNCTIONAL

## 2020-12-18 RX ORDER — HALOPERIDOL 5 MG/ML
1 INJECTION INTRAMUSCULAR
Status: DISCONTINUED | OUTPATIENT
Start: 2020-12-18 | End: 2020-12-18 | Stop reason: HOSPADM

## 2020-12-18 RX ORDER — METHOCARBAMOL 750 MG/1
750 TABLET, FILM COATED ORAL EVERY 8 HOURS PRN
Status: DISCONTINUED | OUTPATIENT
Start: 2020-12-18 | End: 2020-12-21 | Stop reason: HOSPADM

## 2020-12-18 RX ORDER — DIPHENHYDRAMINE HYDROCHLORIDE 50 MG/ML
25 INJECTION INTRAMUSCULAR; INTRAVENOUS EVERY 6 HOURS PRN
Status: DISCONTINUED | OUTPATIENT
Start: 2020-12-18 | End: 2020-12-21 | Stop reason: HOSPADM

## 2020-12-18 RX ORDER — HYDROMORPHONE HYDROCHLORIDE 2 MG/ML
INJECTION, SOLUTION INTRAMUSCULAR; INTRAVENOUS; SUBCUTANEOUS PRN
Status: DISCONTINUED | OUTPATIENT
Start: 2020-12-18 | End: 2020-12-18 | Stop reason: SURG

## 2020-12-18 RX ORDER — DIAZEPAM 5 MG/1
5 TABLET ORAL EVERY 4 HOURS PRN
Status: DISCONTINUED | OUTPATIENT
Start: 2020-12-18 | End: 2020-12-21 | Stop reason: HOSPADM

## 2020-12-18 RX ORDER — TIZANIDINE 4 MG/1
4 TABLET ORAL 3 TIMES DAILY
Qty: 42 TAB | Refills: 0 | Status: SHIPPED | OUTPATIENT
Start: 2020-12-18 | End: 2021-01-01

## 2020-12-18 RX ORDER — OXYCODONE HCL 5 MG/5 ML
5 SOLUTION, ORAL ORAL
Status: COMPLETED | OUTPATIENT
Start: 2020-12-18 | End: 2020-12-18

## 2020-12-18 RX ORDER — POLYETHYLENE GLYCOL 3350 17 G/17G
1 POWDER, FOR SOLUTION ORAL 2 TIMES DAILY PRN
Status: DISCONTINUED | OUTPATIENT
Start: 2020-12-18 | End: 2020-12-21 | Stop reason: HOSPADM

## 2020-12-18 RX ORDER — HYDROMORPHONE HYDROCHLORIDE 1 MG/ML
0.2 INJECTION, SOLUTION INTRAMUSCULAR; INTRAVENOUS; SUBCUTANEOUS
Status: DISCONTINUED | OUTPATIENT
Start: 2020-12-18 | End: 2020-12-18 | Stop reason: HOSPADM

## 2020-12-18 RX ORDER — MEPERIDINE HYDROCHLORIDE 25 MG/ML
12.5 INJECTION INTRAMUSCULAR; INTRAVENOUS; SUBCUTANEOUS
Status: DISCONTINUED | OUTPATIENT
Start: 2020-12-18 | End: 2020-12-18 | Stop reason: HOSPADM

## 2020-12-18 RX ORDER — SODIUM CHLORIDE, SODIUM GLUCONATE, SODIUM ACETATE, POTASSIUM CHLORIDE AND MAGNESIUM CHLORIDE 526; 502; 368; 37; 30 MG/100ML; MG/100ML; MG/100ML; MG/100ML; MG/100ML
INJECTION, SOLUTION INTRAVENOUS
Status: DISCONTINUED | OUTPATIENT
Start: 2020-12-18 | End: 2020-12-18 | Stop reason: SURG

## 2020-12-18 RX ORDER — MIDAZOLAM HYDROCHLORIDE 1 MG/ML
1 INJECTION INTRAMUSCULAR; INTRAVENOUS
Status: DISCONTINUED | OUTPATIENT
Start: 2020-12-18 | End: 2020-12-18 | Stop reason: HOSPADM

## 2020-12-18 RX ORDER — AMOXICILLIN 250 MG
1 CAPSULE ORAL
Status: DISCONTINUED | OUTPATIENT
Start: 2020-12-18 | End: 2020-12-21 | Stop reason: HOSPADM

## 2020-12-18 RX ORDER — SODIUM CHLORIDE, SODIUM LACTATE, POTASSIUM CHLORIDE, CALCIUM CHLORIDE 600; 310; 30; 20 MG/100ML; MG/100ML; MG/100ML; MG/100ML
INJECTION, SOLUTION INTRAVENOUS
Status: DISCONTINUED | OUTPATIENT
Start: 2020-12-18 | End: 2020-12-18 | Stop reason: SURG

## 2020-12-18 RX ORDER — HYDRALAZINE HYDROCHLORIDE 20 MG/ML
5 INJECTION INTRAMUSCULAR; INTRAVENOUS
Status: DISCONTINUED | OUTPATIENT
Start: 2020-12-18 | End: 2020-12-18 | Stop reason: HOSPADM

## 2020-12-18 RX ORDER — CEFAZOLIN SODIUM 1 G/3ML
INJECTION, POWDER, FOR SOLUTION INTRAMUSCULAR; INTRAVENOUS
Status: DISCONTINUED | OUTPATIENT
Start: 2020-12-18 | End: 2020-12-18 | Stop reason: HOSPADM

## 2020-12-18 RX ORDER — SODIUM CHLORIDE, SODIUM LACTATE, POTASSIUM CHLORIDE, CALCIUM CHLORIDE 600; 310; 30; 20 MG/100ML; MG/100ML; MG/100ML; MG/100ML
INJECTION, SOLUTION INTRAVENOUS CONTINUOUS
Status: ACTIVE | OUTPATIENT
Start: 2020-12-18 | End: 2020-12-18

## 2020-12-18 RX ORDER — CEFAZOLIN SODIUM 2 G/100ML
2 INJECTION, SOLUTION INTRAVENOUS EVERY 8 HOURS
Status: COMPLETED | OUTPATIENT
Start: 2020-12-18 | End: 2020-12-19

## 2020-12-18 RX ORDER — ROCURONIUM BROMIDE 10 MG/ML
INJECTION, SOLUTION INTRAVENOUS PRN
Status: DISCONTINUED | OUTPATIENT
Start: 2020-12-18 | End: 2020-12-18 | Stop reason: SURG

## 2020-12-18 RX ORDER — AMOXICILLIN 250 MG
1 CAPSULE ORAL NIGHTLY
Status: DISCONTINUED | OUTPATIENT
Start: 2020-12-18 | End: 2020-12-21 | Stop reason: HOSPADM

## 2020-12-18 RX ORDER — MORPHINE SULFATE 4 MG/ML
4 INJECTION, SOLUTION INTRAMUSCULAR; INTRAVENOUS
Status: COMPLETED | OUTPATIENT
Start: 2020-12-18 | End: 2020-12-18

## 2020-12-18 RX ORDER — HYDROMORPHONE HYDROCHLORIDE 1 MG/ML
0.1 INJECTION, SOLUTION INTRAMUSCULAR; INTRAVENOUS; SUBCUTANEOUS
Status: DISCONTINUED | OUTPATIENT
Start: 2020-12-18 | End: 2020-12-18 | Stop reason: HOSPADM

## 2020-12-18 RX ORDER — ENEMA 19; 7 G/133ML; G/133ML
1 ENEMA RECTAL
Status: DISCONTINUED | OUTPATIENT
Start: 2020-12-18 | End: 2020-12-21 | Stop reason: HOSPADM

## 2020-12-18 RX ORDER — BISACODYL 10 MG
10 SUPPOSITORY, RECTAL RECTAL
Status: DISCONTINUED | OUTPATIENT
Start: 2020-12-18 | End: 2020-12-21 | Stop reason: HOSPADM

## 2020-12-18 RX ORDER — LABETALOL HYDROCHLORIDE 5 MG/ML
10 INJECTION, SOLUTION INTRAVENOUS
Status: DISCONTINUED | OUTPATIENT
Start: 2020-12-18 | End: 2020-12-21 | Stop reason: HOSPADM

## 2020-12-18 RX ORDER — BUPIVACAINE HYDROCHLORIDE AND EPINEPHRINE 5; 5 MG/ML; UG/ML
INJECTION, SOLUTION PERINEURAL
Status: DISCONTINUED | OUTPATIENT
Start: 2020-12-18 | End: 2020-12-18 | Stop reason: HOSPADM

## 2020-12-18 RX ORDER — ONDANSETRON 2 MG/ML
4 INJECTION INTRAMUSCULAR; INTRAVENOUS EVERY 4 HOURS PRN
Status: DISCONTINUED | OUTPATIENT
Start: 2020-12-18 | End: 2020-12-21 | Stop reason: HOSPADM

## 2020-12-18 RX ORDER — LABETALOL HYDROCHLORIDE 5 MG/ML
5 INJECTION, SOLUTION INTRAVENOUS
Status: DISCONTINUED | OUTPATIENT
Start: 2020-12-18 | End: 2020-12-18 | Stop reason: HOSPADM

## 2020-12-18 RX ORDER — DIPHENHYDRAMINE HCL 25 MG
25 TABLET ORAL EVERY 6 HOURS PRN
Status: DISCONTINUED | OUTPATIENT
Start: 2020-12-18 | End: 2020-12-21 | Stop reason: HOSPADM

## 2020-12-18 RX ORDER — DIPHENHYDRAMINE HYDROCHLORIDE 50 MG/ML
12.5 INJECTION INTRAMUSCULAR; INTRAVENOUS
Status: DISCONTINUED | OUTPATIENT
Start: 2020-12-18 | End: 2020-12-18 | Stop reason: HOSPADM

## 2020-12-18 RX ORDER — OXYCODONE HYDROCHLORIDE AND ACETAMINOPHEN 5; 325 MG/1; MG/1
1 TABLET ORAL EVERY 4 HOURS PRN
Qty: 42 TAB | Refills: 0 | Status: SHIPPED | OUTPATIENT
Start: 2020-12-18 | End: 2020-12-25

## 2020-12-18 RX ORDER — CYCLOBENZAPRINE HCL 10 MG
10 TABLET ORAL EVERY 8 HOURS PRN
Status: DISCONTINUED | OUTPATIENT
Start: 2020-12-18 | End: 2020-12-21 | Stop reason: HOSPADM

## 2020-12-18 RX ORDER — MORPHINE SULFATE 4 MG/ML
4 INJECTION, SOLUTION INTRAMUSCULAR; INTRAVENOUS
Status: DISCONTINUED | OUTPATIENT
Start: 2020-12-18 | End: 2020-12-21 | Stop reason: HOSPADM

## 2020-12-18 RX ORDER — ONDANSETRON 2 MG/ML
INJECTION INTRAMUSCULAR; INTRAVENOUS PRN
Status: DISCONTINUED | OUTPATIENT
Start: 2020-12-18 | End: 2020-12-18 | Stop reason: SURG

## 2020-12-18 RX ORDER — DOCUSATE SODIUM 100 MG/1
100 CAPSULE, LIQUID FILLED ORAL 2 TIMES DAILY
Status: DISCONTINUED | OUTPATIENT
Start: 2020-12-18 | End: 2020-12-21 | Stop reason: HOSPADM

## 2020-12-18 RX ORDER — ACETAMINOPHEN 500 MG
1000 TABLET ORAL EVERY 6 HOURS
Status: DISCONTINUED | OUTPATIENT
Start: 2020-12-18 | End: 2020-12-21 | Stop reason: HOSPADM

## 2020-12-18 RX ORDER — CEFAZOLIN SODIUM 1 G/3ML
INJECTION, POWDER, FOR SOLUTION INTRAMUSCULAR; INTRAVENOUS PRN
Status: DISCONTINUED | OUTPATIENT
Start: 2020-12-18 | End: 2020-12-18 | Stop reason: SURG

## 2020-12-18 RX ORDER — HYDROMORPHONE HYDROCHLORIDE 1 MG/ML
0.4 INJECTION, SOLUTION INTRAMUSCULAR; INTRAVENOUS; SUBCUTANEOUS
Status: DISCONTINUED | OUTPATIENT
Start: 2020-12-18 | End: 2020-12-18 | Stop reason: HOSPADM

## 2020-12-18 RX ORDER — ONDANSETRON 2 MG/ML
4 INJECTION INTRAMUSCULAR; INTRAVENOUS
Status: DISCONTINUED | OUTPATIENT
Start: 2020-12-18 | End: 2020-12-18 | Stop reason: HOSPADM

## 2020-12-18 RX ORDER — OXYCODONE HCL 5 MG/5 ML
10 SOLUTION, ORAL ORAL
Status: COMPLETED | OUTPATIENT
Start: 2020-12-18 | End: 2020-12-18

## 2020-12-18 RX ORDER — CALCIUM CARBONATE 500 MG/1
500 TABLET, CHEWABLE ORAL 2 TIMES DAILY
Status: DISCONTINUED | OUTPATIENT
Start: 2020-12-18 | End: 2020-12-21 | Stop reason: HOSPADM

## 2020-12-18 RX ORDER — DEXTROSE MONOHYDRATE, SODIUM CHLORIDE, AND POTASSIUM CHLORIDE 50; 1.49; 9 G/1000ML; G/1000ML; G/1000ML
INJECTION, SOLUTION INTRAVENOUS CONTINUOUS
Status: DISCONTINUED | OUTPATIENT
Start: 2020-12-18 | End: 2020-12-21 | Stop reason: HOSPADM

## 2020-12-18 RX ORDER — ONDANSETRON 4 MG/1
4 TABLET, ORALLY DISINTEGRATING ORAL EVERY 4 HOURS PRN
Status: DISCONTINUED | OUTPATIENT
Start: 2020-12-18 | End: 2020-12-21 | Stop reason: HOSPADM

## 2020-12-18 RX ORDER — PHENYLEPHRINE HYDROCHLORIDE 10 MG/ML
INJECTION, SOLUTION INTRAMUSCULAR; INTRAVENOUS; SUBCUTANEOUS PRN
Status: DISCONTINUED | OUTPATIENT
Start: 2020-12-18 | End: 2020-12-18 | Stop reason: SURG

## 2020-12-18 RX ADMIN — PHENYLEPHRINE HYDROCHLORIDE 100 MCG: 10 INJECTION INTRAVENOUS at 12:44

## 2020-12-18 RX ADMIN — ROCURONIUM BROMIDE 30 MG: 10 INJECTION, SOLUTION INTRAVENOUS at 12:45

## 2020-12-18 RX ADMIN — CEFAZOLIN 2 G: 330 INJECTION, POWDER, FOR SOLUTION INTRAMUSCULAR; INTRAVENOUS at 07:40

## 2020-12-18 RX ADMIN — SODIUM CHLORIDE, POTASSIUM CHLORIDE, SODIUM LACTATE AND CALCIUM CHLORIDE: 600; 310; 30; 20 INJECTION, SOLUTION INTRAVENOUS at 05:52

## 2020-12-18 RX ADMIN — SUFENTANIL CITRATE 200 MCG: 50 INJECTION, SOLUTION EPIDURAL; INTRAVENOUS at 07:55

## 2020-12-18 RX ADMIN — MIDAZOLAM 2 MG: 1 INJECTION INTRAMUSCULAR; INTRAVENOUS at 07:34

## 2020-12-18 RX ADMIN — FENTANYL CITRATE 50 MCG: 50 INJECTION, SOLUTION INTRAMUSCULAR; INTRAVENOUS at 15:30

## 2020-12-18 RX ADMIN — PHENYLEPHRINE HYDROCHLORIDE 50 MCG: 10 INJECTION INTRAVENOUS at 10:32

## 2020-12-18 RX ADMIN — MORPHINE SULFATE: 50 INJECTION, SOLUTION, CONCENTRATE INTRAVENOUS at 22:13

## 2020-12-18 RX ADMIN — FENTANYL CITRATE 100 MCG: 50 INJECTION, SOLUTION INTRAMUSCULAR; INTRAVENOUS at 07:36

## 2020-12-18 RX ADMIN — HYDROMORPHONE HYDROCHLORIDE 0.4 MG: 1 INJECTION, SOLUTION INTRAMUSCULAR; INTRAVENOUS; SUBCUTANEOUS at 15:51

## 2020-12-18 RX ADMIN — CEFAZOLIN 2 G: 330 INJECTION, POWDER, FOR SOLUTION INTRAMUSCULAR; INTRAVENOUS at 11:52

## 2020-12-18 RX ADMIN — PHENYLEPHRINE HYDROCHLORIDE 25 MCG/MIN: 10 INJECTION INTRAVENOUS at 08:22

## 2020-12-18 RX ADMIN — DOCUSATE SODIUM 50 MG AND SENNOSIDES 8.6 MG 1 TABLET: 8.6; 5 TABLET, FILM COATED ORAL at 20:31

## 2020-12-18 RX ADMIN — ROCURONIUM BROMIDE 50 MG: 10 INJECTION, SOLUTION INTRAVENOUS at 11:25

## 2020-12-18 RX ADMIN — PHENYLEPHRINE HYDROCHLORIDE 100 MCG: 10 INJECTION INTRAVENOUS at 08:22

## 2020-12-18 RX ADMIN — SUFENTANIL CITRATE 0.5 MCG/KG/HR: 50 INJECTION, SOLUTION EPIDURAL; INTRAVENOUS at 07:50

## 2020-12-18 RX ADMIN — SUFENTANIL CITRATE 0.5 MCG/KG/HR: 50 INJECTION, SOLUTION EPIDURAL; INTRAVENOUS at 07:48

## 2020-12-18 RX ADMIN — HYDROMORPHONE HYDROCHLORIDE 0.4 MG: 1 INJECTION, SOLUTION INTRAMUSCULAR; INTRAVENOUS; SUBCUTANEOUS at 16:02

## 2020-12-18 RX ADMIN — PROPOFOL 100 MCG/KG/MIN: 10 INJECTION, EMULSION INTRAVENOUS at 07:38

## 2020-12-18 RX ADMIN — PHENYLEPHRINE HYDROCHLORIDE 2000 MCG/KG/MIN: 10 INJECTION INTRAVENOUS at 14:36

## 2020-12-18 RX ADMIN — HYDROMORPHONE HYDROCHLORIDE 0.2 MG: 1 INJECTION, SOLUTION INTRAMUSCULAR; INTRAVENOUS; SUBCUTANEOUS at 16:20

## 2020-12-18 RX ADMIN — POVIDONE IODINE 15 ML: 100 SOLUTION TOPICAL at 05:52

## 2020-12-18 RX ADMIN — POTASSIUM CHLORIDE, DEXTROSE MONOHYDRATE AND SODIUM CHLORIDE: 150; 5; 900 INJECTION, SOLUTION INTRAVENOUS at 17:09

## 2020-12-18 RX ADMIN — FENTANYL CITRATE 50 MCG: 50 INJECTION, SOLUTION INTRAMUSCULAR; INTRAVENOUS at 15:22

## 2020-12-18 RX ADMIN — ACETAMINOPHEN 1000 MG: 500 TABLET ORAL at 17:13

## 2020-12-18 RX ADMIN — ANTACID TABLETS 500 MG: 500 TABLET, CHEWABLE ORAL at 17:14

## 2020-12-18 RX ADMIN — HYDROMORPHONE HYDROCHLORIDE 0.6 MG: 2 INJECTION, SOLUTION INTRAMUSCULAR; INTRAVENOUS; SUBCUTANEOUS at 09:54

## 2020-12-18 RX ADMIN — SODIUM CHLORIDE, POTASSIUM CHLORIDE, SODIUM LACTATE AND CALCIUM CHLORIDE: 600; 310; 30; 20 INJECTION, SOLUTION INTRAVENOUS at 07:37

## 2020-12-18 RX ADMIN — PROPOFOL 100 MG: 10 INJECTION, EMULSION INTRAVENOUS at 07:36

## 2020-12-18 RX ADMIN — OXYCODONE HYDROCHLORIDE 10 MG: 5 SOLUTION ORAL at 15:17

## 2020-12-18 RX ADMIN — ROCURONIUM BROMIDE 20 MG: 10 INJECTION, SOLUTION INTRAVENOUS at 13:43

## 2020-12-18 RX ADMIN — HYDROMORPHONE HYDROCHLORIDE 0.4 MG: 2 INJECTION, SOLUTION INTRAMUSCULAR; INTRAVENOUS; SUBCUTANEOUS at 12:22

## 2020-12-18 RX ADMIN — ONDANSETRON 4 MG: 2 INJECTION INTRAMUSCULAR; INTRAVENOUS at 14:35

## 2020-12-18 RX ADMIN — SODIUM CHLORIDE, SODIUM GLUCONATE, SODIUM ACETATE, POTASSIUM CHLORIDE AND MAGNESIUM CHLORIDE: 526; 502; 368; 37; 30 INJECTION, SOLUTION INTRAVENOUS at 07:29

## 2020-12-18 RX ADMIN — HYDROMORPHONE HYDROCHLORIDE 0.6 MG: 2 INJECTION, SOLUTION INTRAMUSCULAR; INTRAVENOUS; SUBCUTANEOUS at 08:09

## 2020-12-18 RX ADMIN — CYCLOBENZAPRINE 10 MG: 10 TABLET, FILM COATED ORAL at 20:29

## 2020-12-18 RX ADMIN — Medication 100 MG: at 07:36

## 2020-12-18 RX ADMIN — SODIUM CHLORIDE, SODIUM GLUCONATE, SODIUM ACETATE, POTASSIUM CHLORIDE AND MAGNESIUM CHLORIDE: 526; 502; 368; 37; 30 INJECTION, SOLUTION INTRAVENOUS at 07:36

## 2020-12-18 RX ADMIN — PHENYLEPHRINE HYDROCHLORIDE 100 MCG: 10 INJECTION INTRAVENOUS at 12:59

## 2020-12-18 RX ADMIN — MORPHINE SULFATE 4 MG: 4 INJECTION INTRAVENOUS at 17:18

## 2020-12-18 RX ADMIN — PHENYLEPHRINE HYDROCHLORIDE 25 MCG: 10 INJECTION INTRAVENOUS at 08:07

## 2020-12-18 RX ADMIN — DOCUSATE SODIUM 100 MG: 100 CAPSULE ORAL at 17:06

## 2020-12-18 RX ADMIN — SUFENTANIL CITRATE 100 MCG: 50 INJECTION, SOLUTION EPIDURAL; INTRAVENOUS at 08:17

## 2020-12-18 RX ADMIN — SUFENTANIL CITRATE 100 MCG: 50 INJECTION, SOLUTION EPIDURAL; INTRAVENOUS at 10:22

## 2020-12-18 RX ADMIN — PHENYLEPHRINE HYDROCHLORIDE 200 MCG: 10 INJECTION INTRAVENOUS at 13:29

## 2020-12-18 RX ADMIN — HYDROMORPHONE HYDROCHLORIDE 0.4 MG: 2 INJECTION, SOLUTION INTRAMUSCULAR; INTRAVENOUS; SUBCUTANEOUS at 08:57

## 2020-12-18 RX ADMIN — LIDOCAINE HYDROCHLORIDE 0.5 ML: 10 INJECTION, SOLUTION EPIDURAL; INFILTRATION; INTRACAUDAL at 05:45

## 2020-12-18 RX ADMIN — CEFAZOLIN SODIUM 2 G: 2 INJECTION, SOLUTION INTRAVENOUS at 20:33

## 2020-12-18 RX ADMIN — ROCURONIUM BROMIDE 30 MG: 10 INJECTION, SOLUTION INTRAVENOUS at 13:32

## 2020-12-18 ASSESSMENT — PATIENT HEALTH QUESTIONNAIRE - PHQ9
SUM OF ALL RESPONSES TO PHQ9 QUESTIONS 1 AND 2: 0
2. FEELING DOWN, DEPRESSED, IRRITABLE, OR HOPELESS: NOT AT ALL
1. LITTLE INTEREST OR PLEASURE IN DOING THINGS: NOT AT ALL

## 2020-12-18 ASSESSMENT — LIFESTYLE VARIABLES
AVERAGE NUMBER OF DAYS PER WEEK YOU HAVE A DRINK CONTAINING ALCOHOL: 0
TOTAL SCORE: 0
ON A TYPICAL DAY WHEN YOU DRINK ALCOHOL HOW MANY DRINKS DO YOU HAVE: 0
TOTAL SCORE: 0
CONSUMPTION TOTAL: NEGATIVE
EVER HAD A DRINK FIRST THING IN THE MORNING TO STEADY YOUR NERVES TO GET RID OF A HANGOVER: NO
ALCOHOL_USE: NO
HOW MANY TIMES IN THE PAST YEAR HAVE YOU HAD 5 OR MORE DRINKS IN A DAY: 0
TOTAL SCORE: 0
HAVE PEOPLE ANNOYED YOU BY CRITICIZING YOUR DRINKING: NO
HAVE YOU EVER FELT YOU SHOULD CUT DOWN ON YOUR DRINKING: NO
EVER FELT BAD OR GUILTY ABOUT YOUR DRINKING: NO

## 2020-12-18 ASSESSMENT — PAIN SCALES - GENERAL: PAIN_LEVEL: 8

## 2020-12-18 ASSESSMENT — COGNITIVE AND FUNCTIONAL STATUS - GENERAL
MOBILITY SCORE: 12
TOILETING: A LOT
STANDING UP FROM CHAIR USING ARMS: A LOT
CLIMB 3 TO 5 STEPS WITH RAILING: A LOT
SUGGESTED CMS G CODE MODIFIER DAILY ACTIVITY: CL
DRESSING REGULAR LOWER BODY CLOTHING: A LOT
TURNING FROM BACK TO SIDE WHILE IN FLAT BAD: A LOT
MOVING FROM LYING ON BACK TO SITTING ON SIDE OF FLAT BED: A LOT
WALKING IN HOSPITAL ROOM: A LOT
MOVING TO AND FROM BED TO CHAIR: A LOT
EATING MEALS: A LOT
HELP NEEDED FOR BATHING: A LOT
DAILY ACTIVITIY SCORE: 12
DRESSING REGULAR UPPER BODY CLOTHING: A LOT
SUGGESTED CMS G CODE MODIFIER MOBILITY: CL
PERSONAL GROOMING: A LOT

## 2020-12-18 ASSESSMENT — PAIN DESCRIPTION - PAIN TYPE
TYPE: ACUTE PAIN;SURGICAL PAIN
TYPE: SURGICAL PAIN
TYPE: SURGICAL PAIN
TYPE: ACUTE PAIN;SURGICAL PAIN
TYPE: SURGICAL PAIN
TYPE: ACUTE PAIN;SURGICAL PAIN

## 2020-12-18 ASSESSMENT — FIBROSIS 4 INDEX: FIB4 SCORE: 0.64

## 2020-12-18 NOTE — ANESTHESIA PREPROCEDURE EVALUATION
Relevant Problems   ANESTHESIA   (+) PRAVEEN on CPAP      CARDIAC   (+) Heart murmur       Physical Exam    Airway   Mallampati: II  TM distance: >3 FB  Neck ROM: full       Cardiovascular - normal exam  Rhythm: regular  Rate: normal  (+) murmur (systolic)     Dental - normal exam           Pulmonary - normal exam  Breath sounds clear to auscultation     Abdominal    Neurological - normal exam                 Anesthesia Plan    ASA 3   ASA physical status 3 criteria: alcohol and/or substance dependence or abuse    Plan - general       Airway plan will be ETT  (NIMS tube)      Induction: intravenous    Postoperative Plan: Postoperative administration of opioids is intended.    Pertinent diagnostic labs and testing reviewed    Informed Consent:    Anesthetic plan and risks discussed with patient.    Use of blood products discussed with: patient whom consented to blood products.         
11316 Detailed

## 2020-12-18 NOTE — PROGRESS NOTES
Pharmacy Medication Reconciliation      Medication reconciliation updated and complete per pt at bedside  Allergies have been verified and updated   No oral ABX within the last 14 days  Patient home pharmacy:[facility]

## 2020-12-18 NOTE — OP REPORT
DATE OF SERVICE:  12/18/2020     PREOPERATIVE DIAGNOSES:    1.  Cervical spondylosis and myelopathy.  2.  Right partial true vocal fold paralysis.     POSTOPERATIVE DIAGNOSES:  1.  Cervical spondylosis and myelopathy.  2.  Right partial true vocal fold paralysis.     OPERATIONS PERFORMED:  1.  Right neck exploration.  2.  Right cervical spine exposure for ACDF by Dr. Rincon.     ANESTHESIA:  General.     ESTIMATED BLOOD LOSS:  25 mL     COMPLICATIONS:  None.     FINDINGS:  1.  Significant scarring of the right prevertebral and anterior soft tissue   structures including the SCM, platysma, strap muscles and carotid sheath.  2.  Right recurrent laryngeal nerve electrically intact without any   stimulation throughout the case.     INDICATIONS FOR PROCEDURE:  The patient has a longstanding history of severe   cervical neck disease with prior surgery.  She did experience significant   hoarseness postoperatively and was found to have a partial paralysis of her   right true vocal fold.  Given the recurrent nature of her disease and her   complex neck anatomy, it was requested that I assist with neck exposure.  As   such, the above-stated procedures were offered.  These were explained in   detail.  Risks were discussed include but not limited to pain, bleeding,   infection, scar formation, skin numbness, worsening hoarseness, worsening   dysphagia, vocal fold paralysis, recurrence of symptoms and need for further   procedures.  Informed surgical consent was obtained.     DESCRIPTION OF PROCEDURE:  The patient was met in the preoperative holding   area and again the consent and history were reviewed.  She was brought back to   the operating room in good condition.  After appropriate anesthetic markers   were placed, a surgical timeout was taken and general endotracheal anesthesia   was induced.  The EMG nerve monitoring was calibrated and noted to be   functioning properly and monitored by the nerve monitoring team.  She did  have   a prior cervical neck incision.  The superior most one of these was   infiltrated with 0.5% Marcaine with epinephrine.  The patient was then prepped   and draped in the usual sterile fashion.  A 15 blade was used to make an   incision through the skin and subcutaneous tissue.  There was scarring of the   platysma.  This was divided.  Subplatysmal planes were then elevated both   superiorly and inferiorly.  The anterior border of the SCM was identified.    The fascia was divided and the SCM was retracted laterally.  The strap muscles   were identified and these were retracted medially.  The carotid sheath was   identified and dissection continued between the carotid sheath and the strap   muscles.  There was noted to be a significant amount of scarring in this area   and small amount of dissection was performed in between the internal jugular   vein and the carotid artery, but this dissection plane was recognized and then   proceeded with further dissection more medial to the carotid.  The esophagus   was palpated and protected.  The prevertebral soft tissue was then divided and   elevated and the prevertebral fascia was exposed.  We did have a wide   exposure from about C3 to C6.  The case was then turned over to Dr. Rincon for   further exposure of the cervical vertebra and completion of his portion of the   procedure.  Please see his previously dictated operative report.  At the   conclusion of my portion of the procedure, there was no significant   stimulation of the recurrent laryngeal nerve and hemostasis was noted to be   adequate.  There were no apparent complications.  All instrument counts were   correct x2 at the completion of the case.        ______________________________  MD JENNIFER Loepz/LAUREN    DD:  12/18/2020 15:04  DT:  12/18/2020 15:35    Job#:  [id number]

## 2020-12-18 NOTE — OR NURSING
Pt completed swallow screen with this nurse.  Pt was not pocketing food and no cough/chocking noted during screening.  Pt states it felt fine during screening and no pain.

## 2020-12-18 NOTE — ANESTHESIA PROCEDURE NOTES
Arterial Line  Performed by: Jake Lomeli M.D.  Authorized by: Jake Lomeli M.D.     Start Time:  8/15/2020 8:10 AM  End Time:  8/25/2020 8:13 AM  Localization: surface landmarks    Patient Location:  OR  Indication: continuous blood pressure monitoring        Catheter Size:  20 G  Seldinger Technique?: Yes    Laterality:  Left  Site:  Radial artery  Line Secured:  Antimicrobial disc, tape and transparent dressing  Events: patient tolerated procedure well with no complications     Very calcified vessels

## 2020-12-18 NOTE — ANESTHESIA PROCEDURE NOTES
Airway    Date/Time: 12/18/2020 7:37 AM  Performed by: Jake Lomeli M.D.  Authorized by: Jake Lomeli M.D.     Location:  OR  Urgency:  Elective  Difficult Airway: No    Indications for Airway Management:  Anesthesia      Spontaneous Ventilation: absent    Sedation Level:  Deep  Preoxygenated: Yes    Patient Position:  Sniffing  MILS Maintained Throughout: Yes    Mask Difficulty Assessment:  1 - vent by mask  Final Airway Type:  Endotracheal airway  Final Endotracheal Airway:  ETT and NIM tube  Cuffed: Yes    Technique Used for Successful ETT Placement:  Direct laryngoscopy  Devices/Methods Used in Placement:  Anterior pressure/BURP    Insertion Site:  Oral  Blade Type:  Fernando  Laryngoscope Blade/Videolaryngoscope Blade Size:  3  ETT Size (mm):  7.0  Leak Pressue (cm H2O):  20  Measured from:  Teeth  ETT to Teeth (cm):  21  Placement Verified by: auscultation and capnometry    Cormack-Lehane Classification:  Grade IIa - partial view of glottis  Number of Attempts at Approach:  1  Ventilation Between Attempts:  BVM  Number of Other Approaches Attempted:  0

## 2020-12-18 NOTE — DISCHARGE SUMMARY
Discharge Summary    CHIEF COMPLAINT ON ADMISSION  No chief complaint on file.      Reason for Admission  CERVICAL SPONDYLOSIS WITH MYELOPAT*     Admission Date  12/18/2020    CODE STATUS  No Order    HPI & HOSPITAL COURSE  This is a 65 y.o. female here with cervical radiculopathy. Surgery proceeded in 2 stages, anterior corpectomy with dural repair, followed by posterior decompression & fusion. 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 The patient developed a hematoma in the anterior cervical site during the posterior approach, likely dependent, improved after patient returned to supine position with hvac & gentle pressure. Pt was successfully extubated &  Kept on bed rest at 30 degrees for 24hr, then HOB raised as tolerated. Pt was kept in house until cleared by therapy, meeting criteria.   No notes on file    Therefore, she is discharged in good and stable condition to home with close outpatient follow-up.    The patient met 2-midnight criteria for an inpatient stay at the time of discharge.    Discharge Date  Anticipate 12/20/2020 if meeting criteria    FOLLOW UP ITEMS POST DISCHARGE  Keep scheduled apt at [facility] neurosurgery group    DISCHARGE DIAGNOSES  Active Problems:    * No active hospital problems. *  Resolved Problems:    * No resolved hospital problems. *      FOLLOW UP  Future Appointments   Date Time Provider Department Center   3/1/2021 12:00 PM Norma Holguin M.D. [address] [facility]     No follow-up provider specified.    MEDICATIONS ON DISCHARGE     Medication List      START taking these medications      Instructions   oxyCODONE-acetaminophen 5-325 MG Tabs  Commonly known as: PERCOCET   Take 1 Tab by mouth every four hours as needed for Severe Pain for up to 7 days.  Dose: 1 Tab     tizanidine 4 MG Tabs  Commonly known as: ZANAFLEX   Take 1 Tab by mouth 3 times a day for 14 days.  Dose: 4 mg        CONTINUE taking these medications       Instructions   B-12 PO   Take  by mouth.     busPIRone 15 MG tablet  Commonly known as: BUSPAR   Take 1 Tab by mouth 2 times a day.  Dose: 15 mg     CALCIUM PO   Take 1 Dose by mouth every day.  Dose: 1 Dose     escitalopram 20 MG tablet  Commonly known as: LEXAPRO   Take 1 Tab by mouth every day.  Dose: 20 mg     POTASSIUM PO   Take 1 Dose by mouth every day.  Dose: 1 Dose     pregabalin 50 MG capsule  Commonly known as: LYRICA   Take 1 Cap by mouth 2 times a day for 30 days.  Dose: 50 mg     simvastatin 40 MG Tabs  Commonly known as: ZOCOR   TAKE ONE TABLET BY MOUTH EVERY EVENING *ZOCOR*        STOP taking these medications    traMADol 50 MG Tabs  Commonly known as: ULTRAM          Pt will be discharged with percocet 5/325mg 1 tab q4hr prn, 7d, #42, NR & tizanidine 4mg TID prn, 14d   reviewed, low risk per ORT, informed consent obtained    Allergies  No Known Allergies    DIET  No orders of the defined types were placed in this encounter.      ACTIVITY  Light duty.  Weight bearing as tolerated    CONSULTATIONS       PROCEDURES  Stage 1: C3-5 anterior cervical discectomy, C4 corpectomy, anterior fusion, Dr Hirsch & Dr Rincon  Stage 2: Posterior C2-T3 laminectomy & fusion, Dr Rincon  12/18/2020    LABORATORY  Lab Results   Component Value Date    SODIUM 137 12/11/2020    POTASSIUM 4.3 12/11/2020    CHLORIDE 105 12/11/2020    CO2 22 12/11/2020    GLUCOSE 90 12/11/2020    BUN 13 12/11/2020    CREATININE 0.72 12/11/2020        Lab Results   Component Value Date    WBC 10.2 12/11/2020    HEMOGLOBIN 13.8 12/11/2020    HEMATOCRIT 42.3 12/11/2020    PLATELETCT 423 12/11/2020        Total time of the discharge process exceeds 30 minutes.

## 2020-12-18 NOTE — OR SURGEON
Immediate Post OP Note    PreOp Diagnosis:   1. Cervical radiculopathy    PostOp Diagnosis:   Same    Procedure(s):  DISCECTOMY, SPINE, CERVICAL, ANTERIOR APPROACH, WITH FUSION-STAGE #1 C3-5 DISCECTOMY WITH RECURRENT LARYNGEAL NERVE MONITORING SSEPS - Wound Class: Clean with Drain  CORPECTOMY, SPINE-ANTERIOR C4 - Wound Class: Clean with Drain  FUSION, SPINE, CERVICAL, POSTERIOR APPROACH, WITH O-ARM IMAGING GUIDANCE-STAGE #2 C3-T3 LATERAL FUSION - Wound Class: Clean with Drain  LAMINECTOMY, SPINE, CERVICAL, POSTERIOR APPROACH-STAGE#3 C6-T1 LAMI AND C5-7 FACETECTOMY - Wound Class: Clean with Drain  EXPLORATION, NECK AND CERVICAL SPINE EXPOSURE - Wound Class: Clean with Drain    Surgeon(s):  ANILA Mchugh M.D. Caitlin M Clarkin, P.A.-C.    Anesthesiologist/Type of Anesthesia:  Anesthesiologist: Jake Lomeli M.D.  Anesthesia Technician: Araceli Jim/General    Surgical Staff:  Cell Saver : Nkechi Becerra  Circulator: Maren Eden R.N.  Relief Circulator: Dora Pablo R.N.  Scrub Person: Reina Pan  Radiology Technologist: Anisa Luther    Specimens removed if any:  * No specimens in log *    Estimated Blood Loss: 15cc    Findings: see op note    Complications: none        12/18/2020 9:04 AM Natalee Hirsch M.D.

## 2020-12-18 NOTE — ANESTHESIA TIME REPORT
Anesthesia Start and Stop Event Times     Date Time Event    12/18/2020 0730 Anesthesia Start     1457 Anesthesia Stop        Responsible Staff  12/18/20    Name Role Begin End    Jake Lomeli M.D. Anesth 0730 1151    Lavell Mancilla M.D. Anesth 1151 1159    Jake Lomeli M.D. Anesth 1159 1457        Preop Diagnosis (Free Text):  Pre-op Diagnosis     CERVICAL SPONDYLOSIS WITH MYELOPATHY        Preop Diagnosis (Codes):    Post op Diagnosis  Cervical myelopathy (HCC)      Premium Reason  Non-Premium    Comments:

## 2020-12-19 LAB
ERYTHROCYTE [DISTWIDTH] IN BLOOD BY AUTOMATED COUNT: 42.5 FL (ref 35.9–50)
HCT VFR BLD AUTO: 34.9 % (ref 37–47)
HGB BLD-MCNC: 11.2 G/DL (ref 12–16)
MCH RBC QN AUTO: 29.6 PG (ref 27–33)
MCHC RBC AUTO-ENTMCNC: 32.1 G/DL (ref 33.6–35)
MCV RBC AUTO: 92.3 FL (ref 81.4–97.8)
PLATELET # BLD AUTO: 325 K/UL (ref 164–446)
PMV BLD AUTO: 9.1 FL (ref 9–12.9)
RBC # BLD AUTO: 3.78 M/UL (ref 4.2–5.4)
WBC # BLD AUTO: 11.8 K/UL (ref 4.8–10.8)

## 2020-12-19 PROCEDURE — 36415 COLL VENOUS BLD VENIPUNCTURE: CPT

## 2020-12-19 PROCEDURE — 97166 OT EVAL MOD COMPLEX 45 MIN: CPT

## 2020-12-19 PROCEDURE — 770006 HCHG ROOM/CARE - MED/SURG/GYN SEMI*

## 2020-12-19 PROCEDURE — 97162 PT EVAL MOD COMPLEX 30 MIN: CPT

## 2020-12-19 PROCEDURE — A9270 NON-COVERED ITEM OR SERVICE: HCPCS | Performed by: PHYSICIAN ASSISTANT

## 2020-12-19 PROCEDURE — 85027 COMPLETE CBC AUTOMATED: CPT

## 2020-12-19 PROCEDURE — 700111 HCHG RX REV CODE 636 W/ 250 OVERRIDE (IP): Performed by: PHYSICIAN ASSISTANT

## 2020-12-19 PROCEDURE — 700111 HCHG RX REV CODE 636 W/ 250 OVERRIDE (IP): Performed by: NURSE PRACTITIONER

## 2020-12-19 PROCEDURE — 700102 HCHG RX REV CODE 250 W/ 637 OVERRIDE(OP): Performed by: PHYSICIAN ASSISTANT

## 2020-12-19 PROCEDURE — 700101 HCHG RX REV CODE 250: Performed by: PHYSICIAN ASSISTANT

## 2020-12-19 PROCEDURE — 700102 HCHG RX REV CODE 250 W/ 637 OVERRIDE(OP): Performed by: NURSE PRACTITIONER

## 2020-12-19 PROCEDURE — A9270 NON-COVERED ITEM OR SERVICE: HCPCS | Performed by: NURSE PRACTITIONER

## 2020-12-19 RX ORDER — MORPHINE SULFATE 4 MG/ML
2-4 INJECTION, SOLUTION INTRAMUSCULAR; INTRAVENOUS EVERY 4 HOURS PRN
Status: DISCONTINUED | OUTPATIENT
Start: 2020-12-19 | End: 2020-12-21 | Stop reason: HOSPADM

## 2020-12-19 RX ORDER — HYDROMORPHONE HYDROCHLORIDE 1 MG/ML
0.5 INJECTION, SOLUTION INTRAMUSCULAR; INTRAVENOUS; SUBCUTANEOUS ONCE
Status: COMPLETED | OUTPATIENT
Start: 2020-12-19 | End: 2020-12-19

## 2020-12-19 RX ORDER — OXYCODONE HYDROCHLORIDE 5 MG/1
5 TABLET ORAL EVERY 4 HOURS PRN
Status: DISCONTINUED | OUTPATIENT
Start: 2020-12-19 | End: 2020-12-21 | Stop reason: HOSPADM

## 2020-12-19 RX ORDER — OXYCODONE HYDROCHLORIDE 10 MG/1
10 TABLET ORAL EVERY 4 HOURS PRN
Status: DISCONTINUED | OUTPATIENT
Start: 2020-12-19 | End: 2020-12-21 | Stop reason: HOSPADM

## 2020-12-19 RX ADMIN — ACETAMINOPHEN 1000 MG: 500 TABLET ORAL at 11:59

## 2020-12-19 RX ADMIN — DOCUSATE SODIUM 100 MG: 100 CAPSULE ORAL at 16:39

## 2020-12-19 RX ADMIN — CEFAZOLIN SODIUM 2 G: 2 INJECTION, SOLUTION INTRAVENOUS at 05:28

## 2020-12-19 RX ADMIN — POTASSIUM CHLORIDE, DEXTROSE MONOHYDRATE AND SODIUM CHLORIDE: 150; 5; 900 INJECTION, SOLUTION INTRAVENOUS at 05:28

## 2020-12-19 RX ADMIN — OXYCODONE HYDROCHLORIDE 10 MG: 10 TABLET ORAL at 22:27

## 2020-12-19 RX ADMIN — HYDROMORPHONE HYDROCHLORIDE 0.5 MG: 1 INJECTION, SOLUTION INTRAMUSCULAR; INTRAVENOUS; SUBCUTANEOUS at 10:38

## 2020-12-19 RX ADMIN — OXYCODONE 5 MG: 5 TABLET ORAL at 12:00

## 2020-12-19 RX ADMIN — OXYCODONE HYDROCHLORIDE 10 MG: 10 TABLET ORAL at 16:40

## 2020-12-19 RX ADMIN — ACETAMINOPHEN 1000 MG: 500 TABLET ORAL at 16:39

## 2020-12-19 RX ADMIN — ANTACID TABLETS 500 MG: 500 TABLET, CHEWABLE ORAL at 05:26

## 2020-12-19 RX ADMIN — DOCUSATE SODIUM 100 MG: 100 CAPSULE ORAL at 05:26

## 2020-12-19 RX ADMIN — ACETAMINOPHEN 1000 MG: 500 TABLET ORAL at 05:26

## 2020-12-19 ASSESSMENT — GAIT ASSESSMENTS
DEVIATION: OTHER (COMMENT);DECREASED BASE OF SUPPORT;DECREASED HEEL STRIKE;DECREASED TOE OFF
ASSISTIVE DEVICE: FRONT WHEEL WALKER
DISTANCE (FEET): 100

## 2020-12-19 ASSESSMENT — COGNITIVE AND FUNCTIONAL STATUS - GENERAL
SUGGESTED CMS G CODE MODIFIER MOBILITY: CK
SUGGESTED CMS G CODE MODIFIER DAILY ACTIVITY: CK
TOILETING: A LITTLE
DRESSING REGULAR UPPER BODY CLOTHING: A LOT
DAILY ACTIVITIY SCORE: 15
TURNING FROM BACK TO SIDE WHILE IN FLAT BAD: A LITTLE
HELP NEEDED FOR BATHING: A LOT
CLIMB 3 TO 5 STEPS WITH RAILING: A LITTLE
PERSONAL GROOMING: A LITTLE
EATING MEALS: A LITTLE
MOVING FROM LYING ON BACK TO SITTING ON SIDE OF FLAT BED: A LITTLE
STANDING UP FROM CHAIR USING ARMS: A LITTLE
DRESSING REGULAR LOWER BODY CLOTHING: A LOT
MOBILITY SCORE: 18
MOVING TO AND FROM BED TO CHAIR: A LITTLE
WALKING IN HOSPITAL ROOM: A LITTLE

## 2020-12-19 ASSESSMENT — PAIN DESCRIPTION - PAIN TYPE
TYPE: SURGICAL PAIN

## 2020-12-19 ASSESSMENT — ACTIVITIES OF DAILY LIVING (ADL): TOILETING: INDEPENDENT

## 2020-12-19 NOTE — OP REPORT
DATE OF SERVICE:  12/18/2020     SURGEON:  Lavell Rincon MD     COSURGEON:  Natalee Hirsch MD of ENT as the approach surgeon.     FIRST ASSISTANT:  Izabella Mancini PA-C        PREOPERATIVE DIAGNOSES:  [place] neck deformity with cervical spondylosis and   cervical myelopathy at the C4 level with a C3-4, C4-5 disk extrusion.     POSTOPERATIVE DIAGNOSES:  [place] neck deformity with cervical spondylosis and   cervical myelopathy at the C4 level with a C3-4, C4-5 disk extrusion.      PROCEDURES:   1.  Anterior cervical diskectomy at C3-4, anterior cervical diskectomy at   C4-5, anterior cervical corpectomy at C4.  Placement of a PEEK stackable cage   for corpectomy cage between C3 and C5.  2.  Anterior cervical arthroplasty from C3 to C5 using DBM putty.  3.  Anterior plate fixation from C3 to C5 using [employer] system with a   37 mm plate and three 3.5x17 mm self-tapping drilling screws and one 3.5x15 mm   screw.  4.  Neuro monitoring for vocal cord monitoring.  The patient had a history of   vocal cord paralysis on the right and has a paretic residual vocal cord   diagnosed by laryngoscopy by Dr. Hirsch.  5.  ENT anterior approach for exposure of C4 vertebra to assist with   preservation of right vocal cord strength.  6.  Use of fluoroscopy for localization.  7.  Primary repair of CSF leak using muscle graft, Prolene, Adherus glue.  8.  Modifier 22.  This was at least 50% more difficult than the average   anterior cervical  corpectomy.  The patient had a swan neck deformity and also   had ossification of the posterior longitudinal ligament creating a very   difficult to separate C4-5 disk interspace.  This caused when we were   dissecting the patient's dura away and then removing it as part of the   diskectomy, a 4 mm rent in the dura that required patching using omohyoid   muscle, Prolene and Adherus glue.  9.  Autograft was harvested from the patient's C4 corpectomy and added to the   PEEK interbody cage.       COMPLICATIONS:  Primary CSF leak caused by myself when removing the 4-5 disk   interspace that was ossified to the patient's dura making it extremely   difficult to remove.  This required primary repair.     IMPLANTS:  A C3-5 C4 corpectomy cage of PEEK [employer] Zevo system using a 37   mm plate with three 3.5x17 mm screws and one 3.5x15 mm screw and one case of   [place] putty.     BRIEF HISTORY OF PRESENT ILLNESS:  This is a woman born in 1955 who presented   to neurosurgery clinic with profound cervical myelopathy, EMG evidence   demonstrating that she had residual radicular compression at C7.  She has a   history of C5-C6, C6-C7 ACDF performed twice through two different incisions   with paraplegic vocal cord.  She was scoped by ENT that showed that she had a   persistent paretic right vocal cord that partially worked.  The patient also   had a complete effacement of CSF at C3-4, C4-5 due to disk extrusion with cord   signal change at that location as well as another disk herniation at the   C7-T1 level, causing cord compression with cord signal change at that   location.  She was counseled heavily as to the degree of surgery that she   would need including a C4 corpectomy, followed by posterior laminectomy from   C3 through T1 to clear her disk segment as well as to disimpact her C7 nerve   roots by doing C6-C7 facetectomy.     CONSENT:  Consent was obtained from the patient apprising of the risks and   complications and indications of the surgery, which included but were not   limited to CSF leak, plegia and need for more surgery.  She agreed and   consented.     PROCEDURE IN DETAIL:  The patient was brought into the operating room where   she was placed under general anesthesia with endotracheal intubation.  She was   placed in a neutral position with an interscapular roll and then in a slight   sniffing position, The patient was intubated with a NIMS tube for vocal cord   monitoring.  She also had her  SSEPs and MEPs wired.  She had diminished   sensory as well as motor-evoked potentials due to her myelopathy, but she did   have some signals.  We then turned the case over to Dr. Hirsch who did an   approach for us to expose C3 through C5 and we could also see the anterior   plate.  Please see her operative note for detail.  When she had the complete   exposure, we then brought in our physician assistant who assisted with   retraction and we bovied the ALL away from the C3 anterior face, the C4   anterior face and the C5 anterior face until we could see the disk interspace   at C4-5.  We placed a spinal needle into this location and then obtained a   lateral fluoroscopy shot to confirm our level.  We then placed [place] pins   into the C3 vertebral body as well as the C5 vertebral body.  We placed   self-retaining rainbow retractor into the wound to move back the esophagus as   well as other soft tissues and then placed the [place] pins on spread.  We then   removed the disk at the 3-4 disk interspace using a combination of 15 blade,   the drill and the curette.  We then did the same thing at C4-5 level.    However, this was completely effaced and just required us to drill the   endplate of C4 whereas to not drill into the endplate of C5.  Once we drilled   this down to the uncovertebral joints and then we reached the posterior   osteophyte complex.  We then began troughing from uncovertebral joint to   uncovertebral joint using an AM-8 drill bit.  Once we were able to drill down   to the patient's eggshell posterior cortex and we had drilled out most of the   patient's disk interspace, we were then able to undermine the PLL between C3   and C4 and then with a scissor action with the Kerrison, we bit out the PLL   along the undersurface of C3 and then began carrying this down to the   uncovertebral joint to C4-5.  While doing this, we noted that there was fusion   of the posterior aspect of C4 to the dura at the  inferior margin of our   corpectomy.  We were able to reach the 3-4 and 4-5 neural foramens,   disimpacting both the C4 as well as C5 nerve roots.  Once we were able to   reach that, we started cutting across the herniated disk osteophyte complex   underneath the C5 vertebral body.  At this point, we did get a durotomy as the   bone and dura were fused to the disk extrusion.  This was approximately a 3   mm rent.  There was still arachnoid intact and no CSF was becoming extruded.    At this point, we continued with our resection of the C4 corpectomy until we   had complete removal of the disk.  There was a small amount of egg-shelled   bone; however, the disk from behind C5 was completely removed and the dura was   no longer impacted causing an indentation in it.     At this point, we grabbed a piece of the omohyoid muscle with a pair of   Phu's and then cut ourselves an approximately 6 mm plug of musculature and   then using a 5-0 Prolene sutured the plug into place as there was no ability   to sew the dura together as it was completely fused to the bone as well as   having some degree of egg-shelled ossified PLL to the patient's dura.  Once we   did this, we then sized our corpectomy implant, packed this with DBM putty as   well as autograft.  We ran a set of motors while we impacted the trial into   place and then we FloSealed the area of the epidural space, again felt out   both the C3-4, C4-5 foramen using a nerve hook, again bit these out using a 2   punch Kerrison, undermined the posterior aspect of the C3 body to ensure that   there was no residual disk underneath it, which there was not as well as the   C5 body.  The corpectomy itself took a significant amount of time,   approximately an extra hour because of the degree of ossification of the PLL   and requirements to dissect this out.  Once this was completely removed, we   had our primary repair which also took a significant amount of time suturing a    primary dural patch in using musculature.  Once that patch was in place, we   then used Adherus glue to seal the muscle into place and then we tamped our   PEEK implant into place using the tamp, obtained a lateral and AP fluoroscopy   shot to ensure we were in appropriate alignment and then removed the [place]   pins, FloSealed these locations, ran a motor and sensory to ensure there were   no changes, which there were not, and then we placed our anterior plating   system in the wound and under direct fluoroscopy visualization, attached our   plate and locked it in place using the above denoted size.  Once this was   completed, we tunneled a medium Hemovac and then closed the wound primarily in   layers, closing the platysma with 2-0 Vicryls, followed by the skin dermis   with a 3-0 Vicryl and subcutaneous Monocryl stitch and Dermabond as a   dressing.  The drain was hooked up to suction.  There was no CSF leak at the   time of closure.  The drain was left to no suction for passive drainage.     My physician assistant was my first assist for the corpectomy and the surgical   fusion.  My physician assistant was necessary as she assisted with keeping   the field dry, placement of hardware as well as retraction of soft tissue   elements.     Again a reminder, this is a modifier 22.  This is at least 50% more   challenging as the patient had ossification of the posterior longitudinal   ligament, resulting in a CSF leak, which required substantial amount of   efforts to repair the leak, but also to safely resect the C4 corpectomy and   appropriately remove the disk segment impacting the spinal cord.        ______________________________  MD MARILEE Corona/DIAZ/JUNIOR    DD:  12/18/2020 16:06  DT:  12/18/2020 17:28    Job#:  [id number]

## 2020-12-19 NOTE — THERAPY
"Occupational Therapy   Initial Evaluation     Patient Name: Lizy Merlos  Age:  65 y.o., Sex:  female  Medical Record #: [id number]  Today's Date: 12/19/2020     Precautions  Precautions: Fall Risk, Spinal / Back Precautions , Cervical Collar  (aspen collar on HOB>30, may be removed showers/meals)    Assessment  Patient is 65 y.o. female s/p 3 staged surgeries- C3-C5 discectomy and fusion, C3-C6 & C7-T1 fusion, C6-T1 laminectomy & C5-C7 facectomy. Pt provided with c-spine surgery handout, educated on spinal precautions, brace wear and care, carryover of precautions into ADLs/IADLs, and AE/DME needed for home. Pt limited by dizziness this session, 1st time up post stage 3 surgery, this therapist unsure how much education pt will retain. Pt demonstrates decreased activity tolerance, decreased balance, decreased strength, and decreased knowledge of post op precautions limiting pt's safety/indep with ADLs/IADLs/mobility. Pt would benefit from skilled OT services in the acute care setting to address these deficits.    Plan    Recommend Occupational Therapy 4 times per week until therapy goals are met for the following treatments:  Adaptive Equipment, Cognitive Skill Development, Community Re-integration, Neuro Re-Education / Balance, Self Care/Activities of Daily Living, Therapeutic Activities and Therapeutic Exercises.    DC Equipment Recommendations: Front-Wheel Walker, Tub / Shower Seat, Bed Side Commode(may need LB dressing AE)  Discharge Recommendations: Other - Pt reported she will speak with her  today when he comes to visit. At time of OT eval pt would require 24/7 support upon initial return home, therefore  would need to take some time off of work. Anticipate pt will be safe to d/c home with  HH OT or OP depending on progress while in house. Will continue to assess.    Subjective    \"I'd rather do that another day...I'd like to lay back down\"     Objective     12/19/20 1147   Prior Living " Situation   Prior Services None   Housing / Facility 1 Story House   Steps Into Home 0   Steps In Home 0   Bathroom Set up Bathtub / Shower Combination   Equipment Owned None   Lives with - Patient's Self Care Capacity Spouse   Comments Pt reports spouse works, but will find out if he can take time off of work to provide 24/7 support upon initial return home.   Prior Level of ADL Function   Self Feeding Independent   Grooming / Hygiene Independent   Bathing Independent   Dressing Independent   Toileting Independent   Prior Level of IADL Function   Medication Management Independent   Laundry Independent   Kitchen Mobility Independent   Prior Level Of Mobility Independent Without Device in Community;Independent Without Device in Home   Precautions   Precautions Fall Risk;Spinal / Back Precautions ;Cervical Collar    (aspen collar on HOB>30, may be removed showers/meals)   Cognition    Cognition / Consciousness WDL   Level of Consciousness Alert   Comments pleasant and cooperative, receptive to education   Active ROM Upper Body   Active ROM Upper Body  WDL   Strength Upper Body   Upper Body Strength  X   Comments Pt reports BUE strength improving since surgery   Sensation Upper Body   Upper Extremity Sensation  X   Comments pt reports sensation improving since surgery   Coordination Upper Body   Coordination X   Fine Motor Coordination impaired   Gross Motor Coordination impaired   Comments pt reports improving since surgery   Balance Assessment   Sitting Balance (Static) Fair   Sitting Balance (Dynamic) Fair -   Standing Balance (Static) Fair -   Standing Balance (Dynamic) Fair -   Weight Shift Sitting Fair   Weight Shift Standing Fair   Comments FWW   Bed Mobility    Supine to Sit Supervised   Sit to Supine Supervised   Scooting Supervised   Rolling Supervised   Comments log roll technique, HOB slightly elevated, bed rail down   ADL Assessment   Grooming Supervision;Standing  (wash hands, brush teeth)   Upper Body  Dressing   (donning/doffing c-collar deferred)   Lower Body Dressing Moderate Assist  (min A adjust socks, mod A don underwear)   Toileting Minimal Assist  (grab bar assist)   Functional Mobility   Sit to Stand Minimal Assist  (for safety, physical assist not needed)   Bed, Chair, Wheelchair Transfer Minimal Assist  (for safety, physical assist not needed)   Toilet Transfers Minimal Assist  (for safety, physical assist not needed)   Transfer Method Stand Step  (FWW)   Comments min VCs for safe transfer technique   Activity Tolerance   Sitting Edge of Bed 5 mins   Standing 8 mins total   Patient / Family Goals   Patient / Family Goal #1 to go home   Short Term Goals   Short Term Goal # 1 Pt will complete LB dressing at supervision with AE PRN x 5 sessions   Short Term Goal # 2 Pt will doff/don c-collar at mod indep x 5 sessions   Short Term Goal # 3 Pt will complete toileting tasks at supervision x 5 sessions   Anticipated Discharge Equipment and Recommendations   DC Equipment Recommendations Front-Wheel Walker;Tub / Shower Seat;Bed Side Commode  (may need LB dressing AE)   Discharge Recommendations Other -

## 2020-12-19 NOTE — PROGRESS NOTES
Bedside Procedure documentation:  Patient received 0.5mg IV dilaudid just prior to procedure.   Anterior cervical drain site was prepped with iodine, hvac with mixed serous/light bloody output was removed. The site was closed with (1) 4-0 Monocryl suture & covered with a small Tegaderm. Posterior cervical hvac drain was prepped with iodine  & (1) 4-0 Monocryl suture was placed but not tied, to act as a leelee stitch when hvac is ready to be removed. Site was covered with a small Tegaderm. Negligible blood loss, patient tolerated this well. All sharps were counted & properly disposed of. Bedside sterile protocol was adhered to. ALONDRA Edwards was present during the entire procedure.

## 2020-12-19 NOTE — PROGRESS NOTES
Received bedside report from day shift nurse. Patient resting in bed, call light and personal belongings within reach, bed in the low and locked position with upper side rails up. Assessment complete, vital signs stable, all needs met at this time. Hourly rounding in place, plan of care discussed with patient, patient verbalized understanding

## 2020-12-19 NOTE — OP REPORT
DATE OF SERVICE:  12/18/2020     SURGEON:  Lavell Rincon MD     FIRST ASSISTANT:  Izabella Mancini PA-C     PREOPERATIVE DIAGNOSES:  1.  Cervical myelopathy with spondylosis.  2.  Bilateral C7 radiculopathy.  3.  C3-C4, C4-C5, C7-T1 cervical stenosis causing cord compression.     PROCEDURES:  1.  Use of Stealth stereotactic guidance for instrumentation placement.  2.  Placement of posterolateral hardware using facet screws and pedicle screws   at C3, C4, C5, T1, T2, T3 using Stealth navigation.  3.  Posterolateral arthrodesis from C3-C4, C4-C5, C5-C6, C6-C7, C7-T1, T1-T2,   T2-T3.  4.  Laminectomy at C3, C4, C5, C6, C7 and T1 complete.  5.  Complete facetectomy at C6-C7 for a posterior foraminotomy at the unroofed   C7.  6.  Use of intraoperative ultrasound for evaluation of disimpaction of cord.  7.  Monument of autograft from the spinous process and lamina of C3, C4, C5,   C6, C7, T1.  8.  Arthrodesis from C3-T3 using autograft as well as MagniFuse bone bags, BMP   and DBM putty.     IMPLANTS:  [employer]onic instrumentation with facet screws at C3, C4, C5 measuring   3.5x14 mm as well as pedicle screws measuring 4.5x24 at T1, 4.5x26 at T2,   4.0x28 at T3.  We used a 3.5 mm perico bilaterally.  We used a perico-to-perico   crosslink, 10 mL of [place] putty, two 1x10 MagniFuse bone bags as well as a   medium BMP.     COMPLICATIONS:  None.     BLOOD LOSS:  500 mL. Cell Saver one unit was given back.     BRIEF HISTORY OF PRESENT ILLNESS:  Please see our stage I note from the same   day for her HPI.     CONSENT:  Consent was obtained from the patient apprising the risks,   complications, indications, please see consent from stage I from the same day.     DESCRIPTION OF PROCEDURE:  The patient was brought in and was slid off of the   flat table after her anterior portion of her surgery was completed.  We then   placed her in [place] head bui and then flipped her into the prone   position on a chest roll, thigh and hip  pads.  Her head was placed in a slight   chin tuck position.  We then placed the bed in a reverse Trendelenburg   position to keep her as far off of her chest as possible.  We then clipped,   prepped and draped the patient in a sterile usual fashion for a posterior   cervical decompression and fusion from C3 through T3.  We then performed a   surgical time-out, confirming the correct side, site, and procedure and the   patient with all faculty and staff agreeing. We then infiltrated her scalp   with lidocaine with epinephrine.  We did take an AP and lateral fluoroscopy   shot to confirm that our instrumentation from the anterior aspect did not   change.  We then incised the patient's dermis from her cervical spine through   her upper thoracic spine using a 10 blade through the scalpel and dissected in   the avascular plane down to the spinous process, out over the lamina into the   lateral recesses from C3 through T3.  We then attached the spinous process   clamp for the Stealth navigation to the patient's T2 spinous process and then   obtained an O-arm spin for navigation.  Once this was completed, we then   placed  holes for our screws at the appropriate location for pedicle   screws at T3, T2, T1 and then used the hand drill on navigation for the screws   at T1, T2, T3 bilaterally followed by a 3.5 mm tap, followed by the above   directed screw size.  We then repeated this process doing facet trajectories   at C3, C4, C5 bilaterally.  We did this using the navigation system for   trajectory and then we used the 2.2 mm matchstick drill to drill  holes   approximately 16 mm deep, tap these to 3.0 and then felt each one of our holes   with the ball tip probe and then placed our 14 mm facet screws into C3, C4,   C5 bilaterally.  Once this had been completed and we had all of her   instrumentation in place, we then removed the spinous processes from C3   through T1 and passed this off for morselization for  bone graft and then used   the 5 mm round ball bur and egg shelled the bone from C3 through T1 and then   removed it with a combination of Kerrisons of various sizes until we had   complete decompression of the patient's cord.  This was then observed using an   ultrasound to ensure that we had adequate floating of the cord away from any   disk osteophyte complexes in the front.  When we were confirming this, we then   performed our facetectomy at the C6-C7 level to unroof the C7 nerve root as   the patient had persistent radiculopathy despite her original ACDFs at her   prior surgery.  We therefore egg shelled the facets over the nerve root and   then bit away the residual foramen posteriorly using a 2 punch Kerrison until   we could see the nerve root exiting the foramen and we could pass a smooth   nerve hook out the hole where the C7 nerve root was without challenge.  Once   that was completed, we then decorticated the bone from C3 through T3 and then   placed our perico as well as crosslink and final tightened all of her   instrumentation.  Obtained AP and lateral fluoroscopy shot to confirm that all   of her hardware was in good placement, which it was and then we placed our   medium BMP along the posterolateral gutters that had been decorticated.  We   then placed our MagniFuse bone bags between C3 and T1 along with our DBM putty   and autograft at the lower aspects of the construct.  The wound had a Hemovac   placed in it and then we closed our wound in layers after checking for   appropriate hemostasis using 0 Vicryls in the fascia followed by 2-0 Vicryls   on the dermis and surgical staples on the skin.  All counts were correct x2.    I was present for the entirety of the case.  The patient was flipped into the   prone position and extubated and taken to the PACU in stable condition without   any concerns.  My physician assistant was necessary for the case that she   greatly expedited the placement of hardware  exposure and the case.        ______________________________  MD MARILEE Corona/MOLLY    DD:  12/18/2020 16:16  DT:  12/18/2020 17:16    Job#:  [id number]

## 2020-12-19 NOTE — PROGRESS NOTES
Neurosurgery Progress Note    Subjective:  Pain controlled on PCA  Eating ok, no swallowing issues  Has not gotten out of the bed    Exam:  A/Ox4  RUE 5, BLE deltoid /, pt's baseline from shoulder injury, bic/tri 5/  incisions well approximated, no drainage  HVAC anterior: 130ml/8hrs, posterior 75ml/8hrs    BP  Min: 117/85  Max: 156/80  Pulse  Av.3  Min: 72  Max: 84  Resp  Av.5  Min: 14  Max: 20  Temp  Av.3 °C (97.3 °F)  Min: 36 °C (96.8 °F)  Max: 36.6 °C (97.9 °F)  SpO2  Av.6 %  Min: 95 %  Max: 100 %    No data recorded    Recent Labs     20  1514 20  0147   WBC  --  11.8*   RBC  --  3.78*   HEMOGLOBIN 12.1 11.2*   HEMATOCRIT 36.6* 34.9*   MCV  --  92.3   MCH  --  29.6   MCHC  --  32.1*   RDW  --  42.5   PLATELETCT  --  325   MPV  --  9.1                   Intake/Output       20 0700 - 20 0659 20 0700 - 20 0659      4962-7951 8020-1064 Total 5360-7067 6314-1730 Total       Intake    P.O.  --  240 240  --  -- --    P.O. -- 240 240 -- -- --    I.V.  3600  -- 3600  --  -- --    Volume (mL) (electrolyte-A (PLASMALYTE-A) infusion) 1100 -- 1100 -- -- --    Volume (mL) (lactated ringers infusion) 2500 -- 2500 -- -- --    Blood  250  -- 250  --  -- --    Cell Saver 250 -- 250 -- -- --    Total Intake 3850 240 4090 -- -- --       Output    Urine  1300  -- 1300  --  -- --    Urine 1300 -- 1300 -- -- --    Drains  75  425 500  --  -- --    Output (mL) (Closed/Suction Drain 1 Anterior Neck Hemovac) -- 250 250 -- -- --    Output (mL) (Closed/Suction Drain 1 Posterior Neck Hemovac) 75 175 250 -- -- --    Blood  650  -- 650  --  -- --    Est. Blood Loss 650 -- 650 -- -- --    Total Output 2025 2450 -- -- --       Net I/O     1825 -185 1640 -- -- --            Intake/Output Summary (Last 24 hours) at 2020 0939  Last data filed at 2020 0400  Gross per 24 hour   Intake 3190 ml   Output 2050 ml   Net 1140 ml            • Pharmacy Consult Request  1 Each  PHARMACY TO DOSE   • MD ALERT...DO NOT ADMINISTER NSAIDS or ASPIRIN unless ORDERED By Neurosurgery  1 Each PRN   • docusate sodium  100 mg BID   • senna-docusate  1 Tab Nightly   • senna-docusate  1 Tab Q24HRS PRN   • polyethylene glycol/lytes  1 Packet BID PRN   • magnesium hydroxide  30 mL QDAY PRN   • bisacodyl  10 mg Q24HRS PRN   • fleet  1 Each Once PRN   • dextrose 5 % and 0.9 % NaCl with KCl 20 mEq   Continuous   • acetaminophen  1,000 mg Q6HRS   • morphine   Continuous   • morphine injection  4 mg Once PRN   • diphenhydrAMINE  25 mg Q6HRS PRN    Or   • diphenhydrAMINE  25 mg Q6HRS PRN   • ondansetron  4 mg Q4HRS PRN   • ondansetron  4 mg Q4HRS PRN   • methocarbamol  750 mg Q8HRS PRN    Or   • cyclobenzaprine  10 mg Q8HRS PRN    Or   • diazePAM  5 mg Q4HRS PRN   • labetalol  10 mg Q HOUR PRN   • benzocaine-menthol  1 Lozenge Q2HRS PRN   • calcium carbonate  500 mg BID       Assessment and Plan:  Hospital day # 2  POD #1 Ant C4 corpectomy, C3-5 discectomy, posterior C3-T3 fusion  Prophylactic anticoagulation: no         Start date/time: TBD    No swelling around incision, tolerating diet  Start working with PT/OT today  Will remove anterior hemovac today: monitor for swelling and notify us if notice swelling  Continue with posterior hemovac for today  Will DC morphine PCA and start oral pain meds  Labs stable today

## 2020-12-19 NOTE — PROGRESS NOTES
2 RN Skin Check    2 RN skin check complete. W/ Ewelina RN  Devices in place: SCDs, Nasal Cannula and 2 x hemovacs to anterior and posterior neck. .  Skin assessed under devices: yes.  Confirmed pressure ulcers found on: None.  New potential pressure ulcers noted on None. Wound consult placed No.  The following interventions in place Pillows.  2 x surgical sites to anterior and posterior neck.

## 2020-12-19 NOTE — OR NURSING
Pt recovered well. Had some pain in posterior neck that was managed with medication and non pharmacological interventions. Denies nausea, bedside swallow eval was performed, pt tolerated well able to swallow with no issues, denies any throat discomfort. Pt is A&Ox4, VSS, incision to anterior and posterior neck is CDI with hemovacs present. Pt has all belongings on bed along with brace.  was called and updated on pt status and room assignment. Dr. Rincon present at bedside x2 assessing pt, all orders entered.    Pt to be transported to room on 2L O2 via NC. Tank on bed is half full.

## 2020-12-19 NOTE — ANESTHESIA POSTPROCEDURE EVALUATION
Patient: Lizy Merlos    Procedure Summary     Date: 12/18/20 Room / Location: [facility] 03 / SURGERY [facility]    Anesthesia Start: 0730 Anesthesia Stop: 1457    Procedures:       DISCECTOMY, SPINE, CERVICAL, ANTERIOR APPROACH, WITH FUSION-STAGE #1 C3-5 DISCECTOMY WITH RECURRENT LARYNGEAL NERVE MONITORING SSEPS (Neck)      CORPECTOMY, SPINE-ANTERIOR C4 (Neck)      FUSION, SPINE, CERVICAL, POSTERIOR APPROACH, WITH O-ARM IMAGING GUIDANCE-STAGE #2 C3-C6, C7-T3 LATERAL FUSION (Spine Cervical)      LAMINECTOMY, SPINE, CERVICAL, POSTERIOR APPROACH-STAGE#3 C6-T1 LAMI AND C5-7 FACETECTOMY (Spine Cervical)      EXPLORATION, NECK AND CERVICAL SPINE EXPOSURE (Right Neck) Diagnosis: (CERVICAL SPONDYLOSIS WITH MYELOPATHY)    Surgeons: Lavell Rincon M.D. Responsible Provider: Jake Lomeil M.D.    Anesthesia Type: general ASA Status: 3          Final Anesthesia Type: general  Last vitals  BP   Blood Pressure : 131/79    Temp   36 °C (96.8 °F)    Pulse   Pulse: 78   Resp   16    SpO2   98 %      Anesthesia Post Evaluation    Patient location during evaluation: PACU  Patient participation: complete - patient participated  Level of consciousness: awake and alert  Pain score: 8    Airway patency: patent  Anesthetic complications: no  Cardiovascular status: hemodynamically stable  Respiratory status: acceptable  Hydration status: euvolemic    PONV: none           Nurse Pain Score: 8 (NPRS)

## 2020-12-20 LAB
ERYTHROCYTE [DISTWIDTH] IN BLOOD BY AUTOMATED COUNT: 42.1 FL (ref 35.9–50)
HCT VFR BLD AUTO: 35.6 % (ref 37–47)
HGB BLD-MCNC: 11.6 G/DL (ref 12–16)
MCH RBC QN AUTO: 29.9 PG (ref 27–33)
MCHC RBC AUTO-ENTMCNC: 32.6 G/DL (ref 33.6–35)
MCV RBC AUTO: 91.8 FL (ref 81.4–97.8)
PLATELET # BLD AUTO: 319 K/UL (ref 164–446)
PMV BLD AUTO: 9.2 FL (ref 9–12.9)
RBC # BLD AUTO: 3.88 M/UL (ref 4.2–5.4)
WBC # BLD AUTO: 11.9 K/UL (ref 4.8–10.8)

## 2020-12-20 PROCEDURE — 700102 HCHG RX REV CODE 250 W/ 637 OVERRIDE(OP): Performed by: PHYSICIAN ASSISTANT

## 2020-12-20 PROCEDURE — 700102 HCHG RX REV CODE 250 W/ 637 OVERRIDE(OP): Performed by: NURSE PRACTITIONER

## 2020-12-20 PROCEDURE — A9270 NON-COVERED ITEM OR SERVICE: HCPCS | Performed by: NURSE PRACTITIONER

## 2020-12-20 PROCEDURE — 770006 HCHG ROOM/CARE - MED/SURG/GYN SEMI*

## 2020-12-20 PROCEDURE — A9270 NON-COVERED ITEM OR SERVICE: HCPCS | Performed by: PHYSICIAN ASSISTANT

## 2020-12-20 PROCEDURE — 36415 COLL VENOUS BLD VENIPUNCTURE: CPT

## 2020-12-20 PROCEDURE — 85027 COMPLETE CBC AUTOMATED: CPT

## 2020-12-20 RX ADMIN — DOCUSATE SODIUM 50 MG AND SENNOSIDES 8.6 MG 1 TABLET: 8.6; 5 TABLET, FILM COATED ORAL at 20:41

## 2020-12-20 RX ADMIN — DOCUSATE SODIUM 100 MG: 100 CAPSULE ORAL at 05:19

## 2020-12-20 RX ADMIN — OXYCODONE HYDROCHLORIDE 10 MG: 10 TABLET ORAL at 10:15

## 2020-12-20 RX ADMIN — DOCUSATE SODIUM 100 MG: 100 CAPSULE ORAL at 16:50

## 2020-12-20 RX ADMIN — ACETAMINOPHEN 1000 MG: 500 TABLET ORAL at 16:50

## 2020-12-20 RX ADMIN — ACETAMINOPHEN 1000 MG: 500 TABLET ORAL at 11:35

## 2020-12-20 RX ADMIN — ACETAMINOPHEN 1000 MG: 500 TABLET ORAL at 06:00

## 2020-12-20 RX ADMIN — ANTACID TABLETS 500 MG: 500 TABLET, CHEWABLE ORAL at 05:20

## 2020-12-20 RX ADMIN — OXYCODONE HYDROCHLORIDE 10 MG: 10 TABLET ORAL at 02:24

## 2020-12-20 RX ADMIN — OXYCODONE HYDROCHLORIDE 10 MG: 10 TABLET ORAL at 20:41

## 2020-12-20 ASSESSMENT — PAIN DESCRIPTION - PAIN TYPE
TYPE: SURGICAL PAIN

## 2020-12-20 NOTE — FACE TO FACE
Face to Face Note  -  Durable Medical Equipment    TONY Dotson - NPI: [id number]  I certify that this patient is under my care and that they have had a durable medical equipment(DME)face to face encounter by myself that meets the physician DME face-to-face encounter requirements with this patient on:    Date of encounter:   Patient:                    MRN:                       YOB: 2020  Lizy Merlos  [id number]  1955     The encounter with the patient was in whole, or in part, for the following medical condition, which is the primary reason for durable medical equipment:  Post-Op Surgery    I certify that, based on my findings, the following durable medical equipment is medically necessary:  Walkers and Other DME Equipment - FWW, bath/shower chair, bedside commode.    HOME O2 Saturation Measurements:(Values must be present for Home Oxygen orders)         ,     ,         My Clinical findings support the need for the above equipment due to:  Abnormal Gait, Other - decreased endurance, mobility    Supporting Symptoms:        ------------------------------------------------------------------------------------------------------------------    Face to Face Supporting Documentation - Home Health    The encounter with this patient was in whole or in part the primary reason for home health admission.    Date of encounter:   Patient:                    MRN:                       YOB: 2020  Lizy Merlos  [id number]  1955     Home health to see patient for:  Physical Therapy evaluation and treatment and Occupational therapy evaluation and treatment    Skilled need for:  Surgical Aftercare PT/OT to increase mobility, endurance, balance/gait    Skilled nursing interventions to include:  Comment: PT/OT    Homebound evidenced status by:  Need the aid of supportive devices such as crutches, canes, wheelchairs or walkers or Needs the assistance of  another person in order to leave the home. Leaving home must require a considerable and taxing effort. There must exist a normal inability to leave the home.    Community Physician to provide follow up care: Norma Holguin M.D.     Optional Interventions    Wound information & treatment:    Home Infusion Therapy orders:    Line/Drain/Airway:    I certify the face to face encounter for this home care referral meets the CMS requirements and the encounter/clinical assessment with the patient was, in whole, or in part, for the medical condition(s) listed above, which is the primary reason for home health care. Based on my clinical findings: the service(s) are medically necessary, support the need for home health care, and the homebound criteria are met.  I certify that this patient has had a face to face encounter by myself.  CHAZ Dotson. - NPI: [id number]    *Debility, frailty and advanced age in the absence of an acute deterioration or exacerbation of a condition do not qualify a patient for home health.

## 2020-12-20 NOTE — DISCHARGE PLANNING
Received Choice form at 1037  Agency/Facility Name: [facility]  Referral sent per Choice form @ [phone]

## 2020-12-20 NOTE — THERAPY
Physical Therapy   Initial Evaluation     Patient Name: Lizy Merlos  Age:  65 y.o., Sex:  female  Medical Record #: [id number]  Today's Date: 12/19/2020     Precautions: Fall Risk, Cervical Collar  (aspen >30' s/p cervical surgery)    Assessment  Pt presents to PT s/p cervical surgery. She was able to demonstrate hallway ambulation with FWW with close SBA/CGA. She was primarily limited 2' to decreased endurance and mild balance deficits. Anticipate that she will continue to progress well and be capable of dc to home with spouse assist. However given current presentation would recommend home health PT at time of dc to progress towards I as spouse to return to work in ~1 week and pt will be alone for periods of time at that time. See below for details/recs and would also defer to OT recs as well.     Plan    Recommend Physical Therapy 4 times per week until therapy goals are met for the following treatments:  Bed Mobility, Community Re-integration, Equipment, Gait Training, Manual Therapy, Neuro Re-Education / Balance, Self Care/Home Evaluation, Stair Training, Therapeutic Activities and Therapeutic Exercises    DC Equipment Recommendations: Front-Wheel Walker  Discharge Recommendations: Recommend home health for continued physical therapy services(given current presentation; may progress to outpatient)        12/19/20 1231   Prior Living Situation   Prior Services None   Housing / Facility 1 Story House   Steps Into Home 0   Steps In Home 0   Bathroom Set up Bathtub / Shower Combination   Equipment Owned None   Lives with - Patient's Self Care Capacity Spouse   Comments reports spouse works though will be assiting 1st week 24/7   Prior Level of Functional Mobility   Bed Mobility Independent   Transfer Status Independent   Ambulation Independent   Distance Ambulation (Feet)   (community)   Assistive Devices Used None   Stairs Independent   Comments I with IADLs and ADLs prior; noted hx COVID per pt, pt repors  "has \"brain fog\" since   Cognition    Cognition / Consciousness X   Level of Consciousness Alert   Comments very pleasant and cooperative; noted hx of \"brain fog\" post COVID and pt endorses she feels somewhat cognitively delayed at times   Passive ROM Lower Body   Passive ROM Lower Body WDL   Active ROM Lower Body    Active ROM Lower Body  WDL   Strength Lower Body   Lower Body Strength  WDL   Sensation Lower Body   Lower Extremity Sensation   WDL   Balance Assessment   Sitting Balance (Static) Fair   Sitting Balance (Dynamic) Fair   Standing Balance (Static) Fair -   Standing Balance (Dynamic) Fair -   Weight Shift Sitting Fair   Weight Shift Standing Fair   Comments no artie LOB during ambulation with FWW; however slight veering and uncoordinated mechancis at times, though does fair with FWW; would rec SPv/close SBA at this time   Gait Analysis   Gait Level Of Assist   (close SBA/CGA; see balance)   Assistive Device Front Wheel Walker   Distance (Feet) 100   # of Times Distance was Traveled 1   Deviation Other (Comment);Decreased Base Of Support;Decreased Heel Strike;Decreased Toe Off  (reciprocal gait; dec denton/clearance)   # of Stairs Climbed 0   Weight Bearing Status no restrictions   Comments see balance   Bed Mobility    Supine to Sit Supervised   Sit to Supine Supervised   Scooting Supervised   Rolling Supervised   Comments cueing for neutral spine; HOB slightly elevated   Functional Mobility   Sit to Stand   (CGA)   Bed, Chair, Wheelchair Transfer   (CGA)   Transfer Method Other (Comments)  (walks to EOB)   Mobility with FWW   How much difficulty does the patient currently have...   Turning over in bed (including adjusting bedclothes, sheets and blankets)? 3   Sitting down on and standing up from a chair with arms (e.g., wheelchair, bedside commode, etc.) 3   Moving from lying on back to sitting on the side of the bed? 3   How much help from another person does the patient currently need...   Moving to " and from a bed to a chair (including a wheelchair)? 3   Need to walk in a hospital room? 3   Climbing 3-5 steps with a railing? 3   6 clicks Mobility Score 18   Activity Tolerance   Sitting in Chair NT   Sitting Edge of Bed functional   Standing at least 5 mins   Comments no overt/acute fatigue; general dec endurance though pt endorses she has been this way post COVID   Edema / Skin Assessment   Edema / Skin  Not Assessed   Patient / Family Goals    Patient / Family Goal #1 to return home   Short Term Goals    Short Term Goal # 1 Pt will be able to perform sit<>stand/transfers with FWW with SPv in 6tx to promote fnx progression to I    Short Term Goal # 2 Pt will be able to ambulate 150ft with FWW with Spv in 6tx to promote fnx progression to I    Education Group   Education Provided Role of Physical Therapist;Spine Precautions;Use of Assistive Device   Spine Precautions Patient Response Patient;Acceptance;Explanation;Demonstration;Verbal Demonstration;Action Demonstration   Role of Physical Therapist Patient Response Patient;Acceptance;Explanation;Verbal Demonstration   Use of Assistive Device Patient Response Patient;Acceptance;Explanation;Demonstration;Verbal Demonstration;Action Demonstration   Additional Comments education re: activity recs, dc planning, positioning and recs for dispo with re: PT ggiven current presentation

## 2020-12-21 VITALS
DIASTOLIC BLOOD PRESSURE: 75 MMHG | SYSTOLIC BLOOD PRESSURE: 139 MMHG | TEMPERATURE: 98.1 F | RESPIRATION RATE: 16 BRPM | HEART RATE: 83 BPM | BODY MASS INDEX: 27.81 KG/M2 | OXYGEN SATURATION: 96 % | WEIGHT: 156.97 LBS | HEIGHT: 63 IN

## 2020-12-21 PROCEDURE — 97535 SELF CARE MNGMENT TRAINING: CPT

## 2020-12-21 PROCEDURE — 700102 HCHG RX REV CODE 250 W/ 637 OVERRIDE(OP): Performed by: NURSE PRACTITIONER

## 2020-12-21 PROCEDURE — A9270 NON-COVERED ITEM OR SERVICE: HCPCS | Performed by: PHYSICIAN ASSISTANT

## 2020-12-21 PROCEDURE — A9270 NON-COVERED ITEM OR SERVICE: HCPCS | Performed by: NURSE PRACTITIONER

## 2020-12-21 PROCEDURE — 97116 GAIT TRAINING THERAPY: CPT

## 2020-12-21 PROCEDURE — 700102 HCHG RX REV CODE 250 W/ 637 OVERRIDE(OP): Performed by: PHYSICIAN ASSISTANT

## 2020-12-21 PROCEDURE — 700101 HCHG RX REV CODE 250: Performed by: PHYSICIAN ASSISTANT

## 2020-12-21 RX ADMIN — ACETAMINOPHEN 1000 MG: 500 TABLET ORAL at 01:29

## 2020-12-21 RX ADMIN — ACETAMINOPHEN 1000 MG: 500 TABLET ORAL at 11:19

## 2020-12-21 RX ADMIN — CYCLOBENZAPRINE 10 MG: 10 TABLET, FILM COATED ORAL at 11:19

## 2020-12-21 RX ADMIN — DOCUSATE SODIUM 100 MG: 100 CAPSULE ORAL at 04:59

## 2020-12-21 RX ADMIN — POLYETHYLENE GLYCOL 3350 1 PACKET: 17 POWDER, FOR SOLUTION ORAL at 04:59

## 2020-12-21 RX ADMIN — MAGNESIUM HYDROXIDE 30 ML: 400 SUSPENSION ORAL at 04:59

## 2020-12-21 RX ADMIN — OXYCODONE HYDROCHLORIDE 10 MG: 10 TABLET ORAL at 09:12

## 2020-12-21 RX ADMIN — OXYCODONE HYDROCHLORIDE 10 MG: 10 TABLET ORAL at 01:29

## 2020-12-21 RX ADMIN — ANTACID TABLETS 500 MG: 500 TABLET, CHEWABLE ORAL at 04:59

## 2020-12-21 ASSESSMENT — PAIN DESCRIPTION - PAIN TYPE: TYPE: SURGICAL PAIN

## 2020-12-21 ASSESSMENT — COGNITIVE AND FUNCTIONAL STATUS - GENERAL
DAILY ACTIVITIY SCORE: 23
TURNING FROM BACK TO SIDE WHILE IN FLAT BAD: A LITTLE
SUGGESTED CMS G CODE MODIFIER MOBILITY: CJ
SUGGESTED CMS G CODE MODIFIER DAILY ACTIVITY: CI
MOBILITY SCORE: 22
CLIMB 3 TO 5 STEPS WITH RAILING: A LITTLE
HELP NEEDED FOR BATHING: A LITTLE

## 2020-12-21 ASSESSMENT — GAIT ASSESSMENTS
GAIT LEVEL OF ASSIST: SUPERVISED
ASSISTIVE DEVICE: FRONT WHEEL WALKER
DISTANCE (FEET): 200

## 2020-12-21 NOTE — DISCHARGE INSTRUCTIONS
How to Use a Hard Cervical Collar  A hard cervical collar limits the movement of the top part of your spine (cervical spine). The collar holds your head and neck in a straight position. A cervical collar may be used to treat:  · A fracture in the neck.  · Damage to the ligaments. Ligaments are tissues that connect bones.  · Injury to the spinal cord.  There are several types of hard cervical collars. Follow the 's instructions for use. These are general guidelines.  What are the risks?  Wearing a cervical collar is safe. However, problems may occur, including:  · Skin breakdown.  · Sores that form due to rubbing or pressure on the skin (pressure ulcers).  · Pain.  · Difficulty breathing.  · Worsening of your condition if the collar is not placed correctly.  · Increased risk of inhaling food or liquid into your lungs (aspiration).  Supplies needed:  · Extra cervical collar and replacement pads.  · Ice.  · Plastic bag.  · Towel.  · A watertight covering to put over the collar during bathing, if needed.  How to use a cervical collar  · Wear the cervical collar as told by your health care provider. Do not remove it unless told to do so by your health care provider.  · You may be directed to remove it only when you check your skin and change the pads. While the collar is off:  ? Ask another person to assist you if needed.  ? Keep your head and neck straight.  Do not bend your neck or turn your head.  ? Check your skin daily for red areas. Ask for help or use a mirror to check areas you cannot see.  ? After checking your skin, wear the extra cervical collar while cleaning and changing the pads of the other collar.  · Change the pads daily or more often if they become wet or dirty. Keep a clean set of replacement pads.  · Do not let hard plastic edges touch your skin. Cover them with a soft pad.  · If your cervical collar is not waterproof:  ? Do not let it get wet.  ? Cover it with a watertight covering when  you take a bath or a shower.  Follow these instructions at home:    · Put ice on the injured area to manage pain, stiffness, and swelling.  ? Do not remove your cervical collar unless told to do so by your health care provider.  ? Put ice in a plastic bag.  ? Place a towel between your skin and the bag or between your cervical collar and the bag.  ? Leave the ice on for 20 minutes, 2-3 times a day for the first 2 days after your injury.  · Do not drive any vehicle until your health care provider approves.  · Keep all follow-up visits as told by your health care provider. This is important. Any delay in getting the care that you need can prevent proper healing of your injury.  Contact a health care provider if you have:  · Red areas of skin under your cervical collar.  · Pain that is not controlled with your medicines.  Get help right away if you have:  · Numbness or weakness in your arms or legs.  · Difficulty breathing.  These symptoms may represent a serious problem that is an emergency. Do not wait to see if the symptoms will go away. Get medical help right away. Call your local emergency services (911 in the [place] Do not drive yourself to the hospital.  Summary  · A cervical collar is a device that supports the chin and the back of the head. It restricts movement of the neck to prevent more damage after a severe injury.  · A cervical collar may be used to treat a fracture in the neck, damage to tissues that hold bones together (ligaments), or injury to the spinal cord.  · Wear the cervical collar as told by your health care provider. Ask if you may remove the collar to shower, bathe, or eat, or to put ice on your neck.  This information is not intended to replace advice given to you by your health care provider. Make sure you discuss any questions you have with your health care provider.  Document Released: 09/09/2005 Document Revised: 06/25/2019 Document Reviewed: 11/09/2018  Elsevier Patient Education © 2020  [employer]vier Inc[employer]    Discharge Instructions    Discharged to home by car with relative. Discharged via wheelchair, hospital escort: Yes.  Special equipment needed: Walker and Shower Chair and Bedside Commode    Be sure to schedule a follow-up appointment with your primary care doctor or any specialists as instructed.     Discharge Plan:   Influenza Vaccine Indication: Not indicated: Previously immunized this influenza season and > 8 years of age    I understand that a diet low in cholesterol, fat, and sodium is recommended for good health. Unless I have been given specific instructions below for another diet, I accept this instruction as my diet prescription.   Other diet: Easy To Chew Thin Liquids    Special Instructions:  Leave incision open to air. Dermabond glue will flake off over the next 2-3 weeks. Staples will be removed at your 2 week post-op visit  OK to shower & pat dry  No soaking in hot tubs, baths, pools, etc.  Avoid repetitive bending, lifting over 10lbs, twisting. We encourage you to take frequent walks as tolerated  Do not drive or consume alcohol while taking narcotic pain medications. Do not take additional acetaminophen (tylenol) without consulting our office.   Do not take NSAIDs (such as ibuprofen or naproxen) or Aspirin unless you have been told otherwise by Dr Rincon's team  Follow up at [facility] office in 2 weeks. Call with questions or concerns @ [phone]      · Is patient discharged on Warfarin / Coumadin?   No     Depression / Suicide Risk    As you are [facility] from [facility], it is important to learn how to keep safe from harming yourself.    Recognize the warning signs:  · Abrupt changes in personality, positive or negative- including increase in energy   · Giving away possessions  · Change in eating patterns- significant weight changes-  positive or negative  · Change in sleeping patterns- unable to sleep or sleeping all the time   · Unwillingness or  inability to communicate  · Depression  · Unusual sadness, discouragement and loneliness  · Talk of wanting to die  · Neglect of personal appearance   · Rebelliousness- reckless behavior  · Withdrawal from people/activities they love  · Confusion- inability to concentrate     If you or a loved one observes any of these behaviors or has concerns about self-harm, here's what you can do:  · Talk about it- your feelings and reasons for harming yourself  · Remove any means that you might use to hurt yourself (examples: pills, rope, extension cords, firearm)  · Get professional help from the community (Mental Health, Substance Abuse, psychological counseling)  · Do not be alone:Call your Safe Contact- someone whom you trust who will be there for you.  · Call your local CRISIS HOTLINE 646-5514 or 387-577-6546  · Call your local Children's Mobile Crisis Response Team [place] (003) 607-4975 or www.[employer]  · Call the toll free National Suicide Prevention Hotlines   · National Suicide Prevention Lifeline 155-039-XUGE (7624)  · National Hope Line Network 800-SUICIDE (171-8264)      Anterior Cervical Diskectomy and Fusion, Care After  This sheet gives you information about how to care for yourself after your procedure. Your health care provider may also give you more specific instructions. If you have problems or questions, contact your health care provider.  What can I expect after the procedure?  After the procedure, it is common to have:  · Neck pain.  · Discomfort when swallowing.  · Slight hoarseness.  Follow these instructions at home:  If you have a neck brace:  · Wear it as told by your health care provider. Remove it only as told by your health care provider.  · Keep the brace clean and dry.  · Ask your health care provider if you should remove the brace to bathe or shower.  Incision care    · Follow instructions from your health care provider about how to take care of your incision. Make sure  you:  ? Wash your hands with soap and water before and after you change your bandage (dressing). If soap and water are not available, use hand .  ? Change your dressing as told by your health care provider.  ? Leave stitches (sutures), skin glue, or adhesive strips in place. These skin closures may need to stay in place for 2 weeks or longer. If adhesive strip edges start to loosen and curl up, you may trim the loose edges. Do not remove adhesive strips completely unless your health care provider tells you to do that.  · Check your incision area every day for signs of infection. Check for:  ? Redness, swelling, or pain.  ? Fluid or blood.  ? Warmth.  ? Pus or a bad smell.  Managing pain, stiffness, and swelling    · Take over-the-counter and prescription medicines only as told by your health care provider.  · If directed, put ice on the injured area.  ? If you have a removable brace, remove it as told by your health care provider.  ? Put ice in a plastic bag.  ? Place a towel between your skin and the bag.  ? Leave the ice on for 20 minutes, 2-3 times a day.  Activity    · Return to your normal activities as told by your health care provider. Ask your health care provider what activities are safe for you.  · Do exercises as told by your health care provider.  · Do not take baths, swim, or use a hot tub until your health care provider approves.  · Do not lift anything that is heavier than 10 lb (4.5 kg), or the limit that you are told, until your health care provider says that it is safe.  General instructions  · Ask your health care provider if the medicine prescribed to you:  ? Requires you to avoid driving or using heavy machinery.  ? Can cause constipation. You may need to take actions to prevent or treat constipation, such as:  § Drink enough fluid to keep your urine pale yellow.  § Take over-the-counter or prescription medicines.  § Eat foods that are high in fiber, such as beans, whole grains, and  fresh fruits and vegetables.  § Limit foods that are high in fat and processed sugars, such as fried and sweet foods.  · Do not use any products that contain nicotine or tobacco, such as cigarettes, e-cigarettes, and chewing tobacco. These can delay healing. If you need help quitting, ask your health care provider.  · Keep all follow-up visits and physical therapy appointments as told by your health care provider. This is important.  Contact a health care provider if you have:  · A fever.  · Redness, swelling, or pain around your incision.  · Fluid or blood coming from your incision.  · Pus or a bad smell coming from your incision.  · Pain that is not controlled by your pain medicine.  · Increasing hoarseness or trouble swallowing.  Get help right away if you have:  · Severe pain.  · Sudden numbness or weakness in your arms.  · Warmth, tenderness, or swelling in your calf.  · Chest pain.  · Difficulty breathing.  Summary  · After the procedure, it is common to have neck pain, discomfort when swallowing, and slight hoarseness.  · Follow instructions from your health care provider about how to take care of your incision.  · Check your incision area every day for signs of infection.  · Return to your normal activities as told by your health care provider. Ask your health care provider what activities are safe for you.  · Contact a health care provider if you have signs of infection at your incision.  This information is not intended to replace advice given to you by your health care provider. Make sure you discuss any questions you have with your health care provider.  Document Released: 01/13/2017 Document Revised: 09/12/2019 Document Reviewed: 09/12/2019  [employer]Â° Patient Education © 2020 [employer]

## 2020-12-21 NOTE — DISCHARGE SUMMARY
Discharge Summary    CHIEF COMPLAINT ON ADMISSION  No chief complaint on file.      Reason for Admission  CERVICAL SPONDYLOSIS WITH MYELOPATHY    Admission Date  12/18/2020    CODE STATUS  Full Code    HPI & HOSPITAL COURSE  This is a 65 y.o. female here with cervical radiculopathy. Surgery proceeded in 2 stages, anterior corpectomy with dural repair, followed by posterior decompression & fusion. 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 The patient developed a hematoma in the anterior cervical site during the posterior approach, likely dependent, improved after patient returned to supine position with hvac & gentle pressure. Pt was successfully extubated &  Kept on bed rest at 30 degrees for 24hr, then HOB raised as tolerated. Pt was kept in house until cleared by therapy, meeting criteria.   No notes on file    Therefore, she is discharged in good and stable condition to home with close outpatient follow-up with home health care.     The patient met 2-midnight criteria for an inpatient stay at the time of discharge.    Discharge Date  12/21/2020    FOLLOW UP ITEMS POST DISCHARGE  Keep scheduled apt at [facility] neurosurgery group    DISCHARGE DIAGNOSES  Active Problems:    * No active hospital problems. *  Resolved Problems:    * No resolved hospital problems. *      FOLLOW UP  Future Appointments   Date Time Provider Department Center   3/1/2021 12:00 PM [facility]     No follow-up provider specified.    MEDICATIONS ON DISCHARGE     Medication List      START taking these medications      Instructions   oxyCODONE-acetaminophen 5-325 MG Tabs  Commonly known as: PERCOCET   Take 1 Tab by mouth every four hours as needed for Severe Pain for up to 7 days.  Dose: 1 Tab     tizanidine 4 MG Tabs  Commonly known as: ZANAFLEX   Take 1 Tab by mouth 3 times a day for 14 days.  Dose: 4 mg        CONTINUE taking these medications       Instructions   B-12 PO   Take  by mouth.     busPIRone 15 MG tablet  Commonly known as: BUSPAR   Take 1 Tab by mouth 2 times a day.  Dose: 15 mg     CALCIUM PO   Take 1 Dose by mouth every day.  Dose: 1 Dose     escitalopram 20 MG tablet  Commonly known as: LEXAPRO   Take 1 Tab by mouth every day.  Dose: 20 mg     POTASSIUM PO   Take 1 Dose by mouth every day.  Dose: 1 Dose     pregabalin 50 MG capsule  Commonly known as: LYRICA   Take 1 Cap by mouth 2 times a day for 30 days.  Dose: 50 mg     simvastatin 40 MG Tabs  Commonly known as: ZOCOR   TAKE ONE TABLET BY MOUTH EVERY EVENING *ZOCOR*        STOP taking these medications    traMADol 50 MG Tabs  Commonly known as: ULTRAM          Pt will be discharged with percocet 5/325mg 1 tab q4hr prn, 7d, #42, NR & tizanidine 4mg TID prn, 14d   reviewed, low risk per ORT, informed consent obtained    Allergies  No Known Allergies    DIET  Orders Placed This Encounter   Procedures   • Diet Order Diet: Level 7 - Easy to Chew; Liquid level: Level 0 - Thin     Standing Status:   Standing     Number of Occurrences:   1     Order Specific Question:   Diet:     Answer:   Level 7 - Easy to Chew [22]     Order Specific Question:   Liquid level     Answer:   Level 0 - Thin       ACTIVITY  Light duty.  Weight bearing as tolerated    CONSULTATIONS       PROCEDURES  Stage 1: C3-5 anterior cervical discectomy, C4 corpectomy, anterior fusion, Dr Hirsch & Dr Rincon  Stage 2: Posterior C2-T3 laminectomy & fusion, Dr Rincon  12/18/2020    LABORATORY  Lab Results   Component Value Date    SODIUM 137 12/11/2020    POTASSIUM 4.3 12/11/2020    CHLORIDE 105 12/11/2020    CO2 22 12/11/2020    GLUCOSE 90 12/11/2020    BUN 13 12/11/2020    CREATININE 0.72 12/11/2020        Lab Results   Component Value Date    WBC 11.9 (H) 12/20/2020    HEMOGLOBIN 11.6 (L) 12/20/2020    HEMATOCRIT 35.6 (L) 12/20/2020    PLATELETCT 319 12/20/2020        Total time of the discharge process exceeds 30 minutes.

## 2020-12-21 NOTE — PROGRESS NOTES
"Neurosurgery Progress Note    Subjective:  No acute events. \"I am ready to go home\"  Pt denies difficulty swallowing, breathing.   Pain well controlled with oral meds  Denies any radicular arm pain, just some residual numbness of bilat hands at fingertips    Exam:  A/Ox4. Fluent speech. Voice is slightly hoarse.  RUE 5/5, BLE deltoid 4/5, pt's baseline from shoulder injury, bic/tri 5/5  Anterior cervical incision without drainage. Mild bruising and swelling at right lateral aspect of neck. No midline swelling.   Posterior dressing c/d/i.   HVAC posterior 20ml/8hrs    BP  Min: 128/77  Max: 141/79  Pulse  Av.3  Min: 73  Max: 84  Resp  Av.8  Min: 16  Max: 18  Temp  Av.2 °C (97.2 °F)  Min: 36.1 °C (96.9 °F)  Max: 36.4 °C (97.5 °F)  SpO2  Av.7 %  Min: 93 %  Max: 96 %    No data recorded    Recent Labs     20  1514 20  0147 20  0641   WBC  --  11.8* 11.9*   RBC  --  3.78* 3.88*   HEMOGLOBIN 12.1 11.2* 11.6*   HEMATOCRIT 36.6* 34.9* 35.6*   MCV  --  92.3 91.8   MCH  --  29.6 29.9   MCHC  --  32.1* 32.6*   RDW  --  42.5 42.1   PLATELETCT  --  325 319   MPV  --  9.1 9.2                   Intake/Output       20 0700 - 20 0659 20 0700 - 20 0659      7410-8158 3507-7254 Total 2520-6716 4118-2337 Total       Intake    P.O.  --  480 480  --  -- --    P.O. -- 480 480 -- -- --    Total Intake -- 480 480 -- -- --       Output    Urine  --  -- --  --  -- --    Number of Times Voided 2 x -- 2 x -- -- --    Drains  60  90 150  --  -- --    Output (mL) (Closed/Suction Drain 1 Posterior Neck Hemovac) 60 90 150 -- -- --    Total Output 60 90 150 -- -- --       Net I/O     -60 390 330 -- -- --            Intake/Output Summary (Last 24 hours) at 2020 0837  Last data filed at 2020 0450  Gross per 24 hour   Intake 480 ml   Output 150 ml   Net 330 ml            • oxyCODONE immediate-release  5 mg Q4HRS PRN    Or   • oxyCODONE immediate-release  10 mg Q4HRS PRN   • " morphine injection  2-4 mg Q4HRS PRN   • Pharmacy Consult Request  1 Each PHARMACY TO DOSE   • MD ALERT...DO NOT ADMINISTER NSAIDS or ASPIRIN unless ORDERED By Neurosurgery  1 Each PRN   • docusate sodium  100 mg BID   • senna-docusate  1 Tab Nightly   • senna-docusate  1 Tab Q24HRS PRN   • polyethylene glycol/lytes  1 Packet BID PRN   • magnesium hydroxide  30 mL QDAY PRN   • bisacodyl  10 mg Q24HRS PRN   • fleet  1 Each Once PRN   • dextrose 5 % and 0.9 % NaCl with KCl 20 mEq   Continuous   • acetaminophen  1,000 mg Q6HRS   • morphine injection  4 mg Once PRN   • diphenhydrAMINE  25 mg Q6HRS PRN    Or   • diphenhydrAMINE  25 mg Q6HRS PRN   • ondansetron  4 mg Q4HRS PRN   • ondansetron  4 mg Q4HRS PRN   • methocarbamol  750 mg Q8HRS PRN    Or   • cyclobenzaprine  10 mg Q8HRS PRN    Or   • diazePAM  5 mg Q4HRS PRN   • labetalol  10 mg Q HOUR PRN   • benzocaine-menthol  1 Lozenge Q2HRS PRN   • calcium carbonate  500 mg BID       Assessment and Plan:  Hospital day # 4  POD #3 Ant C4 corpectomy, C3-5 discectomy, posterior C3-T3 fusion  Prophylactic anticoagulation: no         Start date/time: TBD      Plan:  Stable neuro  HH/DME ordered yesterday, appreciate update from CM/SW team  Mild swelling and bruising around anterior cervical incision, tolerating diet  Continue to monitor anterior cervical surgical site. If any difficulty with swallowing, breathing page NSG immediately    Dr Rincon will be by this afternoon to remove posterior hvac  Plan to discharge home this afternoon  Rx in chart

## 2020-12-21 NOTE — THERAPY
"Occupational Therapy  Daily Treatment     Patient Name: Lizy Merlos  Age:  65 y.o., Sex:  female  Medical Record #: [id number]  Today's Date: 12/21/2020     Precautions  Precautions: Cervical Collar  , Spinal / Back Precautions     Assessment    Pt was seen for OT tx, reports feeling much better. Pt was able to complete all ADL's at at spv/mod I level; review all spinal precautions and brace don/doff as well as how to change pads. Pt reports good support at d/c     Plan  Discharge secondary to goals met.    DC Equipment Recommendations: (all delivered )  Discharge Recommendations: Anticipate that the patient will have no further occupational therapy needs after discharge from the hospital    Subjective  \"I can have my  help me wash my hair\"      Objective     12/21/20 1103   Cognition    Cognition / Consciousness WDL   Level of Consciousness Alert   Comments pleasant and cooperative    Passive ROM Upper Body   Passive ROM Upper Body WDL   Active ROM Upper Body   Active ROM Upper Body  WDL   Strength Upper Body   Upper Body Strength  WDL   Sensation Upper Body   Upper Extremity Sensation  WDL   Upper Body Muscle Tone   Upper Body Muscle Tone  WDL   Other Treatments   Other Treatments Provided Reviewed all spinal prec, brace don/doff, shower safety, FB dressing and adaptive strategies for ADL's    Balance   Sitting Balance (Static) Fair +   Sitting Balance (Dynamic) Fair +   Standing Balance (Static) Fair +   Standing Balance (Dynamic) Fair +   Weight Shift Sitting Good   Weight Shift Standing Good   Skilled Intervention Facilitation;Compensatory Strategies   Comments w/fww    Bed Mobility    Supine to Sit Supervised   Sit to Supine Supervised   Scooting Supervised   Rolling Supervised   Skilled Intervention Verbal Cuing   Activities of Daily Living   Grooming Supervision;Standing   Upper Body Dressing Supervision   Lower Body Dressing Supervision   Toileting Supervision   Skilled Intervention Verbal " Cuing   Comments don/doffed collar standing at sink    Functional Mobility   Sit to Stand Supervised   Bed, Chair, Wheelchair Transfer Supervised   Toilet Transfers Supervised   Mobility walking in room w/fww    Skilled Intervention Verbal Cuing;Tactile Cuing   Activity Tolerance   Comments no overt c/o pain or fatigue    Patient / Family Goals   Patient / Family Goal #1 to go home   Goal #1 Outcome Progressing as expected   Short Term Goals   Short Term Goal # 1 Pt will complete LB dressing at supervision with AE PRN x 5 sessions   Goal Outcome # 1 Goal met   Short Term Goal # 2 Pt will doff/don c-collar at mod indep x 5 sessions   Goal Outcome # 2 Goal met   Short Term Goal # 3 Pt will complete toileting tasks at supervision x 5 sessions   Goal Outcome # 3 Goal met   Education Group   Role of Occupational Therapist Patient Response Patient;Acceptance;Explanation;Verbal Demonstration   Back Safety Patient Response Patient;Acceptance;Explanation;Handout;Demonstration;Verbal Demonstration;Action Demonstration   Home Safety Patient Response Patient;Acceptance;Explanation;Handout;Verbal Demonstration   Transfers Patient Response Patient;Acceptance;Explanation;Verbal Demonstration;Action Demonstration;Demonstration   ADL Patient Response Patient;Acceptance;Explanation;Demonstration;Verbal Demonstration;Action Demonstration   Adaptive Equipment Patient Response Patient;Acceptance;Explanation;Verbal Demonstration   Anticipated Discharge Equipment and Recommendations   DC Equipment Recommendations   (all delivered )   Discharge Recommendations Anticipate that the patient will have no further occupational therapy needs after discharge from the hospital   Interdisciplinary Plan of Care Collaboration   IDT Collaboration with  Nursing   Patient Position at End of Therapy In Bed;Call Light within Reach;Phone within Reach;Tray Table within Reach   Collaboration Comments RN aware of session    Session Information   Date / Session  Number  12/21 #2    Priority   (goals met )

## 2020-12-21 NOTE — DISCHARGE PLANNING
Agency[facility]Facility Name: [facility]  Spoke To: Micki  Outcome: L/m with Intake to have them call this CCA back with status update on referral     [facility] Name[facility]  Spoke To: Denise  Outcome: Patient Accepted @1204    [facility]  Outcome: Patient Accepted @1219

## 2020-12-21 NOTE — DISCHARGE PLANNING
[facility]  Spoke To: Talia  Outcome: Will schedule delivery for tomorrow since the patient is not discharging today.

## 2020-12-21 NOTE — PROGRESS NOTES
AVS form reviewed with patient. Prescriptions for oxycodone and tizanidine given to patient to fill at preferred pharmacy. Dr. Rincon at bedside and removed drain along with surgical dressing. Ok for patient to leave [facility]. No questions or concerns expressed from patient or . Pt d/c home with home health, walker, and BSC.

## 2020-12-21 NOTE — THERAPY
Physical Therapy   Daily Treatment     Patient Name: Lizy Merlos  Age:  65 y.o., Sex:  female  Medical Record #: [id number]  Today's Date: 12/21/2020     Precautions: Cervical Collar      Assessment    PT goals have been met.  Pt is able to move in/out of bed w/ log rolling, w/ spv, w/o use of bed features and no physical assist.  She is able to stand and ambulate 200 ft w/ a fww w/o loss of balance.  No acute PT needs.  PT will no longer formally follow but will be available for d/c needs.        D/C Equipment Recommendations: Front-Wheel Walker  Discharge Recommendations: Recommend home health for continued physical therapy services             Objective       12/21/20 1024   Balance   Sitting Balance (Static) Fair +   Sitting Balance (Dynamic) Fair +   Standing Balance (Static) Fair +   Standing Balance (Dynamic) Fair +   Weight Shift Sitting Good   Weight Shift Standing Good   Comments w/ fww   Gait Analysis   Gait Level Of Assist Supervised   Assistive Device Front Wheel Walker   Distance (Feet) 200   Bed Mobility    Supine to Sit Supervised   Sit to Supine Supervised   Scooting Supervised   Skilled Intervention Verbal Cuing   Comments log rolling technique   Functional Mobility   Sit to Stand Supervised   Bed, Chair, Wheelchair Transfer Supervised   Short Term Goals    Short Term Goal # 1 Pt will be able to perform sit<>stand/transfers with FWW with SPv in 6tx to promote fnx progression to I    Goal Outcome # 1 Goal met   Short Term Goal # 2 Pt will be able to ambulate 150ft with FWW with Spv in 6tx to promote fnx progression to I    Goal Outcome # 2 Goal met   Anticipated Discharge Equipment and Recommendations   DC Equipment Recommendations Front-Wheel Walker   Discharge Recommendations Recommend home health for continued physical therapy services

## 2020-12-27 ENCOUNTER — PATIENT MESSAGE (OUTPATIENT)
Dept: MEDICAL GROUP | Age: 65
End: 2020-12-27

## 2020-12-27 DIAGNOSIS — M54.42 CHRONIC BILATERAL LOW BACK PAIN WITH BILATERAL SCIATICA: ICD-10-CM

## 2020-12-27 DIAGNOSIS — M54.41 CHRONIC BILATERAL LOW BACK PAIN WITH BILATERAL SCIATICA: ICD-10-CM

## 2020-12-27 DIAGNOSIS — G89.29 CHRONIC BILATERAL LOW BACK PAIN WITH BILATERAL SCIATICA: ICD-10-CM

## 2020-12-27 DIAGNOSIS — M54.12 CERVICAL RADICULOPATHY: ICD-10-CM

## 2021-01-06 RX ORDER — PREGABALIN 50 MG/1
50 CAPSULE ORAL 2 TIMES DAILY
Qty: 60 CAP | Refills: 2 | Status: SHIPPED | OUTPATIENT
Start: 2021-01-06 | End: 2021-02-05

## 2021-02-26 ENCOUNTER — TELEMEDICINE (OUTPATIENT)
Dept: MEDICAL GROUP | Age: 66
End: 2021-02-26
Payer: COMMERCIAL

## 2021-02-26 VITALS — WEIGHT: 150 LBS | BODY MASS INDEX: 26.58 KG/M2 | HEIGHT: 63 IN

## 2021-02-26 DIAGNOSIS — F41.8 DEPRESSION WITH ANXIETY: ICD-10-CM

## 2021-02-26 DIAGNOSIS — E78.00 HYPERCHOLESTEROLEMIA: ICD-10-CM

## 2021-02-26 DIAGNOSIS — Z12.31 ENCOUNTER FOR SCREENING MAMMOGRAM FOR BREAST CANCER: ICD-10-CM

## 2021-02-26 DIAGNOSIS — G89.29 CHRONIC BILATERAL LOW BACK PAIN WITH BILATERAL SCIATICA: Primary | ICD-10-CM

## 2021-02-26 DIAGNOSIS — M54.41 CHRONIC BILATERAL LOW BACK PAIN WITH BILATERAL SCIATICA: Primary | ICD-10-CM

## 2021-02-26 DIAGNOSIS — M54.42 CHRONIC BILATERAL LOW BACK PAIN WITH BILATERAL SCIATICA: Primary | ICD-10-CM

## 2021-02-26 DIAGNOSIS — M54.12 CERVICAL RADICULOPATHY: ICD-10-CM

## 2021-02-26 PROBLEM — F41.1 GAD (GENERALIZED ANXIETY DISORDER): Status: RESOLVED | Noted: 2020-05-01 | Resolved: 2021-02-26

## 2021-02-26 PROBLEM — M76.61 ACHILLES TENDINITIS OF RIGHT LOWER EXTREMITY: Status: RESOLVED | Noted: 2020-09-10 | Resolved: 2021-02-26

## 2021-02-26 PROCEDURE — 99214 OFFICE O/P EST MOD 30 MIN: CPT | Mod: 95 | Performed by: FAMILY MEDICINE

## 2021-02-26 RX ORDER — MELOXICAM 15 MG/1
15 TABLET ORAL
Qty: 60 TABLET | Refills: 0 | Status: SHIPPED
Start: 2021-02-26 | End: 2021-11-02

## 2021-02-26 RX ORDER — PREGABALIN 50 MG/1
50 CAPSULE ORAL 2 TIMES DAILY
Qty: 60 CAPSULE | Refills: 5 | Status: SHIPPED | OUTPATIENT
Start: 2021-03-07 | End: 2021-06-08 | Stop reason: SDUPTHER

## 2021-02-26 ASSESSMENT — FIBROSIS 4 INDEX: FIB4 SCORE: 0.84

## 2021-02-26 NOTE — PROGRESS NOTES
Virtual Visit: Established Patient   This visit was conducted via Zoom using secure and encrypted videoconferencing technology. The patient was in a private location in the state [facility].    The patient's identity was confirmed and verbal consent was obtained for this virtual visit.    Subjective:   CC: chronic neck and back pain follow up     Lizy Merlos is a 65 y.o. female with history of cervical radiculopathy.  Patient recently underwent anterior corpectomy with dural repair followed by posterior decompression and fusion on December 18, 2020 with Dr. Rincon.  Surgical wound is healing well.  Chronic neck pain has resolved.  Patient still have residue mild numbness at the tip of her left index and thumb.  She no longer takes pain medication.  However, she continues to take Lyrica 50 mg twice daily for both neck and low back pain.  In regard to her low back pain, patient states that Dr. Rincon is planning to do epidural injection.  However, she has to wait for 6 months after surgery to have epidural injection.  She continues to take meloxicam 15mg as needed for her symptoms.    Her depression and anxiety are under good control with BuSpar 50 mg and Lexapro 10 mg daily.  This condition is currently being managed by psychiatry.  She does not work with behavioral health.  Since her neck pain is in much better control after the surgery, patient states that her mood is improving as well.  She denies suicidal ideation or plans.    ROS   Denies any recent fevers or chills. No nausea or vomiting. No chest pains or shortness of breath.     No Known Allergies    Current medicines (including changes today)  Current Outpatient Medications   Medication Sig Dispense Refill   • [START ON 3/7/2021] pregabalin (LYRICA) 50 MG capsule Take 1 capsule by mouth 2 times a day for 180 days. 60 capsule 5   • meloxicam (MOBIC) 15 MG tablet Take 1 tablet by mouth 1 time a day as needed. With foods 60 tablet 0   • CALCIUM PO Take 1  Dose by mouth every day.     • POTASSIUM PO Take 1 Dose by mouth every day.     • escitalopram (LEXAPRO) 20 MG tablet Take 1 Tab by mouth every day. 90 Tab 1   • busPIRone (BUSPAR) 15 MG tablet Take 1 Tab by mouth 2 times a day. 180 Tab 1   • simvastatin (ZOCOR) 40 MG Tab TAKE ONE TABLET BY MOUTH EVERY EVENING *ZOCOR* 90 Tab 1   • Cyanocobalamin (B-12 PO) Take  by mouth.       No current facility-administered medications for this visit.       Patient Active Problem List    Diagnosis Date Noted   • Vocal cord dysfunction_Dr Hirsch 12/03/2020   • Cervical radiculopathy_s/p decompression and fusion by Dr Rincon in 12/2020 09/10/2020   • Osteoarthritis of carpometacarpal (CMC) joint of both thumbs_Dr. Remy  03/27/2020   • Hx of SCC_R temporal_removed in 3/2018 in [place] 03/27/2020   • Heart murmur 03/27/2020   • PRAVEEN on CPAP 11/20/2019   • Depression with anxiety_psych 07/23/2019   • Hypercholesterolemia 07/23/2019   • Chronic bilateral low back pain with bilateral sciatica 07/23/2019       Family History   Problem Relation Age of Onset   • Dementia Mother    • Heart Disease Mother         pacemaker   • Heart Disease Father         congestive heart failure   • Alcohol/Drug Paternal Uncle    • Dementia Maternal Grandmother    • Depression Maternal Grandmother    • Alcohol/Drug Maternal Grandfather    • Stroke Paternal Grandmother    • Heart Attack Paternal Grandfather        She  has a past medical history of Alcohol abuse, Anxiety disorder (09/2019), Arthritis (09/2019), Depression (09/2019), High cholesterol, Liver disease, Restless leg syndrome, Sleep apnea, Snoring, and Urinary incontinence.  She  has a past surgical history that includes cervical disk and fusion anterior (1993 and 1994); pr shldr arthroscop,surg,w/rotat cuff repr (Left, 10/1/2019); pr shldr arthroscop,part acromioplas (Left, 10/1/2019); pr arthroscopy shoulder surgical biceps tenodes* (Left, 10/1/2019); tonsillectomy; sinuscope; primary c section;  "shoulder surgery (Left); pr jones w/o facetec foramot/dskc 1/2 vrt seg, ce* (12/18/2020); cervical disk and fusion anterior (12/18/2020); corpectomy (12/18/2020); posterior cervical fusion o-arm (12/18/2020); and neck exploration (Right, 12/18/2020).       Objective:   Ht 1.6 m (5' 3\")   Wt 68 kg (150 lb) Comment: pt stated  LMP  (LMP Unknown)   BMI 26.57 kg/m²     Physical Exam:  Constitutional: Alert, no distress, well-groomed.  Skin: No rashes in visible areas.  Eye: Round. Conjunctiva clear, lids normal. No icterus.   ENMT: Lips pink without lesions, good dentition, moist mucous membranes. Phonation normal.  Neck: No masses, no thyromegaly. Moves freely without pain.  Respiratory: Unlabored respiratory effort, no cough or audible wheeze  Psych: Alert and oriented x3, normal affect and mood.       Assessment and Plan:   The following treatment plan was discussed:     1. Chronic bilateral low back pain with bilateral sciatica  Working with Dr. Rincon, controlled with Lyrica 50 mg BID.   Epidural steroid injection is planned in about 6 months.   - weight loss, regular exercises  - Meloxicam 15 mg daily PRN   - I discussed health risks/complications associated with chronic consumption of NSAID. Appropriate counseling provided.    - activity modification  - f/u with Dr. Jain as directed.     2. Cervical radiculopathy_Dr Rincon  S/p anterior corpectomy with dural repair, followed by posterior decompression & fusion on 12/18/2020. Doing every well post-op.   No longer needs opioids. Continues to take Lyrica 50 mg BID and Meloxicam PRN.   She does have residue numbness at the tips of her L index and thumb.   - f/u with Dr. Rincon as directed  - cont Lyrica and Meloxicam     3. Depression with anxiety  Chronic, controlled with Buspar 15 mg and Lexapro 20 mg qd, no s/e reported, will continue.    - f/u with Psych as directed.     4. Encounter for screening mammogram for breast cancer  - MA-SCREENING MAMMO BILAT W/CAD; " Future      Follow-up: Return in about 6 months (around 8/26/2021) for Multiple issues.

## 2021-03-03 DIAGNOSIS — Z23 NEED FOR VACCINATION: ICD-10-CM

## 2021-03-04 ENCOUNTER — PATIENT MESSAGE (OUTPATIENT)
Dept: SLEEP MEDICINE | Facility: MEDICAL CENTER | Age: 66
End: 2021-03-04

## 2021-03-04 DIAGNOSIS — G47.33 OSA (OBSTRUCTIVE SLEEP APNEA): ICD-10-CM

## 2021-03-05 NOTE — PATIENT COMMUNICATION
Patient needs a new Rx for PAP supplies.    Last OV: 06/2020    Next OV: 06/2021    DME: [facility]    Last Rx dated 02/2020.

## 2021-03-05 NOTE — TELEPHONE ENCOUNTER
From: Lizy Merlos  To: Physician Fitz Richmond  Sent: 3/4/2021 8:59 AM PST  Subject: Prescription Question    Please have a prescription sent to [facility] saying that all supplies for my CPAP are authorized. The prescription they said  in February.     Thank you,  Lizy Nix

## 2021-04-08 ENCOUNTER — HOSPITAL ENCOUNTER (OUTPATIENT)
Dept: RADIOLOGY | Facility: MEDICAL CENTER | Age: 66
End: 2021-04-08
Attending: FAMILY MEDICINE
Payer: COMMERCIAL

## 2021-04-08 ENCOUNTER — APPOINTMENT (RX ONLY)
Dept: URBAN - METROPOLITAN AREA CLINIC 4 | Facility: CLINIC | Age: 66
Setting detail: DERMATOLOGY
End: 2021-04-08

## 2021-04-08 DIAGNOSIS — L81.4 OTHER MELANIN HYPERPIGMENTATION: ICD-10-CM

## 2021-04-08 DIAGNOSIS — D22 MELANOCYTIC NEVI: ICD-10-CM

## 2021-04-08 DIAGNOSIS — Z71.89 OTHER SPECIFIED COUNSELING: ICD-10-CM

## 2021-04-08 DIAGNOSIS — Z85.828 PERSONAL HISTORY OF OTHER MALIGNANT NEOPLASM OF SKIN: ICD-10-CM

## 2021-04-08 DIAGNOSIS — Z12.31 ENCOUNTER FOR SCREENING MAMMOGRAM FOR BREAST CANCER: ICD-10-CM

## 2021-04-08 DIAGNOSIS — D18.0 HEMANGIOMA: ICD-10-CM

## 2021-04-08 DIAGNOSIS — D17 BENIGN LIPOMATOUS NEOPLASM: ICD-10-CM

## 2021-04-08 DIAGNOSIS — L82.1 OTHER SEBORRHEIC KERATOSIS: ICD-10-CM

## 2021-04-08 PROBLEM — D22.62 MELANOCYTIC NEVI OF LEFT UPPER LIMB, INCLUDING SHOULDER: Status: ACTIVE | Noted: 2021-04-08

## 2021-04-08 PROBLEM — D22.61 MELANOCYTIC NEVI OF RIGHT UPPER LIMB, INCLUDING SHOULDER: Status: ACTIVE | Noted: 2021-04-08

## 2021-04-08 PROBLEM — D22.72 MELANOCYTIC NEVI OF LEFT LOWER LIMB, INCLUDING HIP: Status: ACTIVE | Noted: 2021-04-08

## 2021-04-08 PROBLEM — D23.71 OTHER BENIGN NEOPLASM OF SKIN OF RIGHT LOWER LIMB, INCLUDING HIP: Status: ACTIVE | Noted: 2021-04-08

## 2021-04-08 PROBLEM — D22.39 MELANOCYTIC NEVI OF OTHER PARTS OF FACE: Status: ACTIVE | Noted: 2021-04-08

## 2021-04-08 PROBLEM — D22.71 MELANOCYTIC NEVI OF RIGHT LOWER LIMB, INCLUDING HIP: Status: ACTIVE | Noted: 2021-04-08

## 2021-04-08 PROBLEM — D22.5 MELANOCYTIC NEVI OF TRUNK: Status: ACTIVE | Noted: 2021-04-08

## 2021-04-08 PROBLEM — D17.1 BENIGN LIPOMATOUS NEOPLASM OF SKIN AND SUBCUTANEOUS TISSUE OF TRUNK: Status: ACTIVE | Noted: 2021-04-08

## 2021-04-08 PROBLEM — D18.01 HEMANGIOMA OF SKIN AND SUBCUTANEOUS TISSUE: Status: ACTIVE | Noted: 2021-04-08

## 2021-04-08 PROCEDURE — ? COUNSELING

## 2021-04-08 PROCEDURE — ? SUNSCREEN TREATMENT REGIMEN

## 2021-04-08 PROCEDURE — 77063 BREAST TOMOSYNTHESIS BI: CPT

## 2021-04-08 PROCEDURE — 99203 OFFICE O/P NEW LOW 30 MIN: CPT

## 2021-04-08 PROCEDURE — ? ADDITIONAL NOTES

## 2021-04-08 ASSESSMENT — LOCATION ZONE DERM
LOCATION ZONE: FACE
LOCATION ZONE: LEG
LOCATION ZONE: SCALP
LOCATION ZONE: TRUNK
LOCATION ZONE: ARM

## 2021-04-08 ASSESSMENT — LOCATION DETAILED DESCRIPTION DERM
LOCATION DETAILED: INFERIOR THORACIC SPINE
LOCATION DETAILED: LEFT CENTRAL MALAR CHEEK
LOCATION DETAILED: LEFT MEDIAL UPPER BACK
LOCATION DETAILED: RIGHT SUPERIOR LATERAL MIDBACK
LOCATION DETAILED: LEFT INFERIOR CENTRAL MALAR CHEEK
LOCATION DETAILED: RIGHT ANTERIOR DISTAL UPPER ARM
LOCATION DETAILED: RIGHT SUPERIOR TEMPLE
LOCATION DETAILED: EPIGASTRIC SKIN
LOCATION DETAILED: RIGHT MEDIAL FRONTAL SCALP
LOCATION DETAILED: RIGHT CENTRAL MALAR CHEEK
LOCATION DETAILED: LEFT ANTERIOR DISTAL UPPER ARM
LOCATION DETAILED: SUPERIOR THORACIC SPINE
LOCATION DETAILED: LEFT ANTERIOR PROXIMAL UPPER ARM
LOCATION DETAILED: LEFT SUPERIOR MEDIAL UPPER BACK
LOCATION DETAILED: LEFT ANTERIOR PROXIMAL THIGH
LOCATION DETAILED: LEFT MEDIAL FRONTAL SCALP
LOCATION DETAILED: MIDDLE STERNUM
LOCATION DETAILED: RIGHT SUPERIOR PARIETAL SCALP
LOCATION DETAILED: RIGHT ANTERIOR PROXIMAL UPPER ARM
LOCATION DETAILED: LOWER STERNUM
LOCATION DETAILED: RIGHT SUPERIOR LATERAL BUCCAL CHEEK
LOCATION DETAILED: LEFT INFERIOR MEDIAL FOREHEAD
LOCATION DETAILED: RIGHT ANTERIOR PROXIMAL THIGH

## 2021-04-08 ASSESSMENT — LOCATION SIMPLE DESCRIPTION DERM
LOCATION SIMPLE: LEFT THIGH
LOCATION SIMPLE: RIGHT THIGH
LOCATION SIMPLE: RIGHT CHEEK
LOCATION SIMPLE: RIGHT UPPER ARM
LOCATION SIMPLE: RIGHT TEMPLE
LOCATION SIMPLE: LEFT SCALP
LOCATION SIMPLE: RIGHT LOWER BACK
LOCATION SIMPLE: LEFT CHEEK
LOCATION SIMPLE: LEFT FOREHEAD
LOCATION SIMPLE: ABDOMEN
LOCATION SIMPLE: UPPER BACK
LOCATION SIMPLE: RIGHT SCALP
LOCATION SIMPLE: LEFT UPPER ARM
LOCATION SIMPLE: LEFT UPPER BACK
LOCATION SIMPLE: SCALP
LOCATION SIMPLE: CHEST

## 2021-04-08 NOTE — PROCEDURE: ADDITIONAL NOTES
Render Risk Assessment In Note?: no
Additional Notes: Lesion of concern on the scalp an right cheek is clinically consistent with a seborrheic keratosis.
Detail Level: Simple
Additional Notes: Discussed treatment options including monitor vs excision with MD or General Surgeon\\nRisk vs benefits were discussed.\\nPatient would like to think about her options before moving forward.

## 2021-04-08 NOTE — HPI: SECONDARY COMPLAINT
How Severe Is This Condition?: mild
Additional History: Patient states she is concerned with lesion on scalp and right side of face.

## 2021-04-21 ENCOUNTER — TELEMEDICINE (OUTPATIENT)
Dept: BEHAVIORAL HEALTH | Facility: CLINIC | Age: 66
End: 2021-04-21
Payer: COMMERCIAL

## 2021-04-21 DIAGNOSIS — F33.41 RECURRENT MAJOR DEPRESSIVE DISORDER, IN PARTIAL REMISSION (HCC): ICD-10-CM

## 2021-04-21 DIAGNOSIS — F41.1 GAD (GENERALIZED ANXIETY DISORDER): ICD-10-CM

## 2021-04-21 PROCEDURE — 99214 OFFICE O/P EST MOD 30 MIN: CPT | Mod: 95,CR | Performed by: PSYCHIATRY & NEUROLOGY

## 2021-04-21 RX ORDER — ESCITALOPRAM OXALATE 20 MG/1
20 TABLET ORAL DAILY
Qty: 90 TABLET | Refills: 1 | Status: SHIPPED | OUTPATIENT
Start: 2021-04-21 | End: 2021-06-10 | Stop reason: SDUPTHER

## 2021-04-21 RX ORDER — BUSPIRONE HYDROCHLORIDE 15 MG/1
15 TABLET ORAL 2 TIMES DAILY
Qty: 180 TABLET | Refills: 1 | Status: SHIPPED | OUTPATIENT
Start: 2021-04-21 | End: 2021-06-24 | Stop reason: SDUPTHER

## 2021-04-21 ASSESSMENT — PATIENT HEALTH QUESTIONNAIRE - PHQ9: CLINICAL INTERPRETATION OF PHQ2 SCORE: 0
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
## 2021-05-19 ENCOUNTER — APPOINTMENT (RX ONLY)
Dept: URBAN - METROPOLITAN AREA CLINIC 4 | Facility: CLINIC | Age: 66
Setting detail: DERMATOLOGY
End: 2021-05-19

## 2021-05-19 DIAGNOSIS — D17 BENIGN LIPOMATOUS NEOPLASM: ICD-10-CM

## 2021-05-19 PROBLEM — D17.1 BENIGN LIPOMATOUS NEOPLASM OF SKIN AND SUBCUTANEOUS TISSUE OF TRUNK: Status: ACTIVE | Noted: 2021-05-19

## 2021-05-19 PROCEDURE — ? EXCISION

## 2021-05-19 PROCEDURE — 13101 CMPLX RPR TRUNK 2.6-7.5 CM: CPT

## 2021-05-19 PROCEDURE — 11406 EXC TR-EXT B9+MARG >4.0 CM: CPT

## 2021-05-19 PROCEDURE — 13102 CMPLX RPR TRUNK ADDL 5CM/<: CPT

## 2021-05-19 ASSESSMENT — LOCATION SIMPLE DESCRIPTION DERM: LOCATION SIMPLE: RIGHT LOWER BACK

## 2021-05-19 ASSESSMENT — LOCATION DETAILED DESCRIPTION DERM: LOCATION DETAILED: RIGHT INFERIOR MEDIAL MIDBACK

## 2021-05-19 ASSESSMENT — LOCATION ZONE DERM: LOCATION ZONE: TRUNK

## 2021-05-19 NOTE — PROCEDURE: EXCISION
Medical Necessity Information: It is in your best interest to select a reason for this procedure from the list below. All of these items fulfill various CMS LCD requirements except lesion extends to a margin.
Include Z78.9 (Other Specified Conditions Influencing Health Status) As An Associated Diagnosis?: No
Medical Necessity Clause: This procedure was medically necessary because the lesion that was treated was:
Lab: [id number]
Lab Facility: 
Referring Physician (Optional): AMISH Troy
Surgeon (Optional): Sai
Size Of Lesion In Cm: 12
X Size Of Lesion In Cm (Optional): 0
Size Of Margin In Cm: 0.2
Excision Method: Fusiform
Repair Type: Complex
Suturegard Retention Suture: 2-0 Nylon
Retention Suture Bite Size: 3 mm
Length To Time In Minutes Device Was In Place: 10
Number Of Hemigard Strips Per Side: 1
Intermediate / Complex Repair - Final Wound Length In Cm: 14
Complex Requirements: Extensive Undermining Performed?: Yes
Width Of Defect Perpendicular To Closure In Cm (Required): 6.6
Distance Of Undermining In Cm (Required): 8.9
Undermining Type: Entire Wound
Debridement Text: The wound edges were debrided prior to proceeding with the closure to facilitate wound healing.
Helical Rim Text: The closure involved the helical rim.
Vermilion Border Text: The closure involved the vermilion border.
Nostril Rim Text: The closure involved the nostril rim.
Retention Suture Text: Retention sutures were placed to support the closure and prevent dehiscence.
Suture Removal: 14 days
Epidermal Closure Graft Donor Site (Optional): simple interrupted
Graft Donor Site Bandage (Optional-Leave Blank If You Don't Want In Note): Steri-strips and a pressure bandage were applied to the donor site.
Detail Level: Detailed
Excision Depth: adipose tissue
Scalpel Size: 15 blade
Anesthesia Type: 1% lidocaine with epinephrine
Additional Anesthesia Volume In Cc: 6
Hemostasis: Electrocautery
Estimated Blood Loss (Cc): minimal
Repair Anesthesia Method: local infiltration
Anesthesia Volume In Cc: 20
Deep Sutures: 2-0 Vicryl
Dermal Closure: buried vertical mattress
Epidermal Sutures: 5-0 Caprosyn
Epidermal Closure: running subcuticular
Wound Care: Petrolatum
Dressing: dry sterile dressing
Suturegard Intro: Intraoperative tissue expansion was performed, utilizing the SUTUREGARD device, in order to reduce wound tension.
Suturegard Body: The suture ends were repeatedly re-tightened and re-clamped to achieve the desired tissue expansion.
Hemigard Intro: Due to skin fragility and wound tension, it was decided to use HEMIGARD adhesive retention suture devices to permit a linear closure. The skin was cleaned and dried for a 6cm distance away from the wound. Excessive hair, if present, was removed to allow for adhesion.
Hemigard Postcare Instructions: The HEMIGARD strips are to remain completely dry for at least 5-7 days.
Complex Repair Preamble Text (Leave Blank If You Do Not Want): Extensive wide undermining was performed.
Intermediate Repair Preamble Text (Leave Blank If You Do Not Want): Undermining was performed with blunt dissection.
Fusiform Excision Additional Text (Leave Blank If You Do Not Want): The margin was drawn around the clinically apparent lesion.  A fusiform shape was then drawn on the skin incorporating the lesion and margins.  Incisions were then made along these lines to the appropriate tissue plane and the lesion was extirpated.
Eliptical Excision Additional Text (Leave Blank If You Do Not Want): The margin was drawn around the clinically apparent lesion.  An elliptical shape was then drawn on the skin incorporating the lesion and margins.  Incisions were then made along these lines to the appropriate tissue plane and the lesion was extirpated.
Saucerization Excision Additional Text (Leave Blank If You Do Not Want): The margin was drawn around the clinically apparent lesion.  Incisions were then made along these lines, in a tangential fashion, to the appropriate tissue plane and the lesion was extirpated.
Slit Excision Additional Text (Leave Blank If You Do Not Want): A linear line was drawn on the skin overlying the lesion. An incision was made slowly until the lesion was visualized.  Once visualized, the lesion was removed with blunt dissection.
Excisional Biopsy Additional Text (Leave Blank If You Do Not Want): The margin was drawn around the clinically apparent lesion. An elliptical shape was then drawn on the skin incorporating the lesion and margins.  Incisions were then made along these lines to the appropriate tissue plane and the lesion was extirpated.
Perilesional Excision Additional Text (Leave Blank If You Do Not Want): The margin was drawn around the clinically apparent lesion. Incisions were then made along these lines to the appropriate tissue plane and the lesion was extirpated.
Repair Performed By Another Provider Text (Leave Blank If You Do Not Want): After the tissue was excised the defect was repaired by another provider.
No Repair - Repaired With Adjacent Surgical Defect Text (Leave Blank If You Do Not Want): After the excision the defect was repaired concurrently with another surgical defect which was in close approximation.
Advancement Flap (Single) Text: The defect edges were debeveled with a #15 scalpel blade.  Given the location of the defect and the proximity to free margins a single advancement flap was deemed most appropriate.  Using a sterile surgical marker, an appropriate advancement flap was drawn incorporating the defect and placing the expected incisions within the relaxed skin tension lines where possible.    The area thus outlined was incised deep to adipose tissue with a #15 scalpel blade.  The skin margins were undermined to an appropriate distance in all directions utilizing iris scissors.
Advancement Flap (Double) Text: The defect edges were debeveled with a #15 scalpel blade.  Given the location of the defect and the proximity to free margins a double advancement flap was deemed most appropriate.  Using a sterile surgical marker, the appropriate advancement flaps were drawn incorporating the defect and placing the expected incisions within the relaxed skin tension lines where possible.    The area thus outlined was incised deep to adipose tissue with a #15 scalpel blade.  The skin margins were undermined to an appropriate distance in all directions utilizing iris scissors.
Burow's Advancement Flap Text: The defect edges were debeveled with a #15 scalpel blade.  Given the location of the defect and the proximity to free margins a Burow's advancement flap was deemed most appropriate.  Using a sterile surgical marker, the appropriate advancement flap was drawn incorporating the defect and placing the expected incisions within the relaxed skin tension lines where possible.    The area thus outlined was incised deep to adipose tissue with a #15 scalpel blade.  The skin margins were undermined to an appropriate distance in all directions utilizing iris scissors.
Chonodrocutaneous Helical Advancement Flap Text: The defect edges were debeveled with a #15 scalpel blade.  Given the location of the defect and the proximity to free margins a chondrocutaneous helical advancement flap was deemed most appropriate.  Using a sterile surgical marker, the appropriate advancement flap was drawn incorporating the defect and placing the expected incisions within the relaxed skin tension lines where possible.    The area thus outlined was incised deep to adipose tissue with a #15 scalpel blade.  The skin margins were undermined to an appropriate distance in all directions utilizing iris scissors.
Crescentic Advancement Flap Text: The defect edges were debeveled with a #15 scalpel blade.  Given the location of the defect and the proximity to free margins a crescentic advancement flap was deemed most appropriate.  Using a sterile surgical marker, the appropriate advancement flap was drawn incorporating the defect and placing the expected incisions within the relaxed skin tension lines where possible.    The area thus outlined was incised deep to adipose tissue with a #15 scalpel blade.  The skin margins were undermined to an appropriate distance in all directions utilizing iris scissors.
A-T Advancement Flap Text: The defect edges were debeveled with a #15 scalpel blade.  Given the location of the defect, shape of the defect and the proximity to free margins an A-T advancement flap was deemed most appropriate.  Using a sterile surgical marker, an appropriate advancement flap was drawn incorporating the defect and placing the expected incisions within the relaxed skin tension lines where possible.    The area thus outlined was incised deep to adipose tissue with a #15 scalpel blade.  The skin margins were undermined to an appropriate distance in all directions utilizing iris scissors.
O-T Advancement Flap Text: The defect edges were debeveled with a #15 scalpel blade.  Given the location of the defect, shape of the defect and the proximity to free margins an O-T advancement flap was deemed most appropriate.  Using a sterile surgical marker, an appropriate advancement flap was drawn incorporating the defect and placing the expected incisions within the relaxed skin tension lines where possible.    The area thus outlined was incised deep to adipose tissue with a #15 scalpel blade.  The skin margins were undermined to an appropriate distance in all directions utilizing iris scissors.
O-L Flap Text: The defect edges were debeveled with a #15 scalpel blade.  Given the location of the defect, shape of the defect and the proximity to free margins an O-L flap was deemed most appropriate.  Using a sterile surgical marker, an appropriate advancement flap was drawn incorporating the defect and placing the expected incisions within the relaxed skin tension lines where possible.    The area thus outlined was incised deep to adipose tissue with a #15 scalpel blade.  The skin margins were undermined to an appropriate distance in all directions utilizing iris scissors.
O-Z Flap Text: The defect edges were debeveled with a #15 scalpel blade.  Given the location of the defect, shape of the defect and the proximity to free margins an O-Z flap was deemed most appropriate.  Using a sterile surgical marker, an appropriate transposition flap was drawn incorporating the defect and placing the expected incisions within the relaxed skin tension lines where possible. The area thus outlined was incised deep to adipose tissue with a #15 scalpel blade.  The skin margins were undermined to an appropriate distance in all directions utilizing iris scissors.
Double O-Z Flap Text: The defect edges were debeveled with a #15 scalpel blade.  Given the location of the defect, shape of the defect and the proximity to free margins a Double O-Z flap was deemed most appropriate.  Using a sterile surgical marker, an appropriate transposition flap was drawn incorporating the defect and placing the expected incisions within the relaxed skin tension lines where possible. The area thus outlined was incised deep to adipose tissue with a #15 scalpel blade.  The skin margins were undermined to an appropriate distance in all directions utilizing iris scissors.
V-Y Flap Text: The defect edges were debeveled with a #15 scalpel blade.  Given the location of the defect, shape of the defect and the proximity to free margins a V-Y flap was deemed most appropriate.  Using a sterile surgical marker, an appropriate advancement flap was drawn incorporating the defect and placing the expected incisions within the relaxed skin tension lines where possible.    The area thus outlined was incised deep to adipose tissue with a #15 scalpel blade.  The skin margins were undermined to an appropriate distance in all directions utilizing iris scissors.
Mercedes Flap Text: The defect edges were debeveled with a #15 scalpel blade.  Given the location of the defect, shape of the defect and the proximity to free margins a Mercedes flap was deemed most appropriate.  Using a sterile surgical marker, an appropriate advancement flap was drawn incorporating the defect and placing the expected incisions within the relaxed skin tension lines where possible. The area thus outlined was incised deep to adipose tissue with a #15 scalpel blade.  The skin margins were undermined to an appropriate distance in all directions utilizing iris scissors.
Modified Advancement Flap Text: The defect edges were debeveled with a #15 scalpel blade.  Given the location of the defect, shape of the defect and the proximity to free margins a modified advancement flap was deemed most appropriate.  Using a sterile surgical marker, an appropriate advancement flap was drawn incorporating the defect and placing the expected incisions within the relaxed skin tension lines where possible.    The area thus outlined was incised deep to adipose tissue with a #15 scalpel blade.  The skin margins were undermined to an appropriate distance in all directions utilizing iris scissors.
Mucosal Advancement Flap Text: Given the location of the defect, shape of the defect and the proximity to free margins a mucosal advancement flap was deemed most appropriate. Incisions were made with a 15 blade scalpel in the appropriate fashion along the cutaneous vermillion border and the mucosal lip. The remaining actinically damaged mucosal tissue was excised.  The mucosal advancement flap was then elevated to the gingival sulcus with care taken to preserve the neurovascular structures and advanced into the primary defect. Care was taken to ensure that precise realignment of the vermilion border was achieved.
Hatchet Flap Text: The defect edges were debeveled with a #15 scalpel blade.  Given the location of the defect, shape of the defect and the proximity to free margins a hatchet flap was deemed most appropriate.  Using a sterile surgical marker, an appropriate hatchet flap was drawn incorporating the defect and placing the expected incisions within the relaxed skin tension lines where possible.    The area thus outlined was incised deep to adipose tissue with a #15 scalpel blade.  The skin margins were undermined to an appropriate distance in all directions utilizing iris scissors.
Rotation Flap Text: The defect edges were debeveled with a #15 scalpel blade.  Given the location of the defect, shape of the defect and the proximity to free margins a rotation flap was deemed most appropriate.  Using a sterile surgical marker, an appropriate rotation flap was drawn incorporating the defect and placing the expected incisions within the relaxed skin tension lines where possible.    The area thus outlined was incised deep to adipose tissue with a #15 scalpel blade.  The skin margins were undermined to an appropriate distance in all directions utilizing iris scissors.
Spiral Flap Text: The defect edges were debeveled with a #15 scalpel blade.  Given the location of the defect, shape of the defect and the proximity to free margins a spiral flap was deemed most appropriate.  Using a sterile surgical marker, an appropriate rotation flap was drawn incorporating the defect and placing the expected incisions within the relaxed skin tension lines where possible. The area thus outlined was incised deep to adipose tissue with a #15 scalpel blade.  The skin margins were undermined to an appropriate distance in all directions utilizing iris scissors.
Star Wedge Flap Text: The defect edges were debeveled with a #15 scalpel blade.  Given the location of the defect, shape of the defect and the proximity to free margins a star wedge flap was deemed most appropriate.  Using a sterile surgical marker, an appropriate rotation flap was drawn incorporating the defect and placing the expected incisions within the relaxed skin tension lines where possible. The area thus outlined was incised deep to adipose tissue with a #15 scalpel blade.  The skin margins were undermined to an appropriate distance in all directions utilizing iris scissors.
Transposition Flap Text: The defect edges were debeveled with a #15 scalpel blade.  Given the location of the defect and the proximity to free margins a transposition flap was deemed most appropriate.  Using a sterile surgical marker, an appropriate transposition flap was drawn incorporating the defect.    The area thus outlined was incised deep to adipose tissue with a #15 scalpel blade.  The skin margins were undermined to an appropriate distance in all directions utilizing iris scissors.
Muscle Hinge Flap Text: The defect edges were debeveled with a #15 scalpel blade.  Given the size, depth and location of the defect and the proximity to free margins a muscle hinge flap was deemed most appropriate.  Using a sterile surgical marker, an appropriate hinge flap was drawn incorporating the defect. The area thus outlined was incised with a #15 scalpel blade.  The skin margins were undermined to an appropriate distance in all directions utilizing iris scissors.
Nasal Turnover Hinge Flap Text: The defect edges were debeveled with a #15 scalpel blade.  Given the size, depth, location of the defect and the defect being full thickness a nasal turnover hinge flap was deemed most appropriate.  Using a sterile surgical marker, an appropriate hinge flap was drawn incorporating the defect. The area thus outlined was incised with a #15 scalpel blade. The flap was designed to recreate the nasal mucosal lining and the alar rim. The skin margins were undermined to an appropriate distance in all directions utilizing iris scissors.
Nasalis-Muscle-Based Myocutaneous Island Pedicle Flap Text: Using a #15 blade, an incision was made around the donor flap to the level of the nasalis muscle. Wide lateral undermining was then performed in both the subcutaneous plane above the nasalis muscle, and in a submuscular plane just above periosteum. This allowed the formation of a free nasalis muscle axial pedicle (based on the angular artery) which was still attached to the actual cutaneous flap, increasing its mobility and vascular viability. Hemostasis was obtained with pinpoint electrocoagulation. The flap was mobilized into position and the pivotal anchor points positioned and stabilized with buried interrupted sutures. Subcutaneous and dermal tissues were closed in a multilayered fashion with sutures. Tissue redundancies were excised, and the epidermal edges were apposed without significant tension and sutured with sutures.
Orbicularis Oris Muscle Flap Text: The defect edges were debeveled with a #15 scalpel blade.  Given that the defect affected the competency of the oral sphincter an orbicularis oris muscle flap was deemed most appropriate to restore this competency and normal muscle function.  Using a sterile surgical marker, an appropriate flap was drawn incorporating the defect. The area thus outlined was incised with a #15 scalpel blade.
Melolabial Transposition Flap Text: The defect edges were debeveled with a #15 scalpel blade.  Given the location of the defect and the proximity to free margins a melolabial flap was deemed most appropriate.  Using a sterile surgical marker, an appropriate melolabial transposition flap was drawn incorporating the defect.    The area thus outlined was incised deep to adipose tissue with a #15 scalpel blade.  The skin margins were undermined to an appropriate distance in all directions utilizing iris scissors.
Rhombic Flap Text: The defect edges were debeveled with a #15 scalpel blade.  Given the location of the defect and the proximity to free margins a rhombic flap was deemed most appropriate.  Using a sterile surgical marker, an appropriate rhombic flap was drawn incorporating the defect.    The area thus outlined was incised deep to adipose tissue with a #15 scalpel blade.  The skin margins were undermined to an appropriate distance in all directions utilizing iris scissors.
Rhomboid Transposition Flap Text: The defect edges were debeveled with a #15 scalpel blade.  Given the location of the defect and the proximity to free margins a rhomboid transposition flap was deemed most appropriate.  Using a sterile surgical marker, an appropriate rhomboid flap was drawn incorporating the defect.    The area thus outlined was incised deep to adipose tissue with a #15 scalpel blade.  The skin margins were undermined to an appropriate distance in all directions utilizing iris scissors.
Bi-Rhombic Flap Text: The defect edges were debeveled with a #15 scalpel blade.  Given the location of the defect and the proximity to free margins a bi-rhombic flap was deemed most appropriate.  Using a sterile surgical marker, an appropriate rhombic flap was drawn incorporating the defect. The area thus outlined was incised deep to adipose tissue with a #15 scalpel blade.  The skin margins were undermined to an appropriate distance in all directions utilizing iris scissors.
Helical Rim Advancement Flap Text: The defect edges were debeveled with a #15 blade scalpel.  Given the location of the defect and the proximity to free margins (helical rim) a double helical rim advancement flap was deemed most appropriate.  Using a sterile surgical marker, the appropriate advancement flaps were drawn incorporating the defect and placing the expected incisions between the helical rim and antihelix where possible.  The area thus outlined was incised through and through with a #15 scalpel blade.  With a skin hook and iris scissors, the flaps were gently and sharply undermined and freed up.
Bilateral Helical Rim Advancement Flap Text: The defect edges were debeveled with a #15 blade scalpel.  Given the location of the defect and the proximity to free margins (helical rim) a bilateral helical rim advancement flap was deemed most appropriate.  Using a sterile surgical marker, the appropriate advancement flaps were drawn incorporating the defect and placing the expected incisions between the helical rim and antihelix where possible.  The area thus outlined was incised through and through with a #15 scalpel blade.  With a skin hook and iris scissors, the flaps were gently and sharply undermined and freed up.
Ear Star Wedge Flap Text: The defect edges were debeveled with a #15 blade scalpel.  Given the location of the defect and the proximity to free margins (helical rim) an ear star wedge flap was deemed most appropriate.  Using a sterile surgical marker, the appropriate flap was drawn incorporating the defect and placing the expected incisions between the helical rim and antihelix where possible.  The area thus outlined was incised through and through with a #15 scalpel blade.
Banner Transposition Flap Text: The defect edges were debeveled with a #15 scalpel blade.  Given the location of the defect and the proximity to free margins a Banner transposition flap was deemed most appropriate.  Using a sterile surgical marker, an appropriate flap drawn around the defect. The area thus outlined was incised deep to adipose tissue with a #15 scalpel blade.  The skin margins were undermined to an appropriate distance in all directions utilizing iris scissors.
Bilobed Flap Text: The defect edges were debeveled with a #15 scalpel blade.  Given the location of the defect and the proximity to free margins a bilobe flap was deemed most appropriate.  Using a sterile surgical marker, an appropriate bilobe flap drawn around the defect.    The area thus outlined was incised deep to adipose tissue with a #15 scalpel blade.  The skin margins were undermined to an appropriate distance in all directions utilizing iris scissors.
Bilobed Transposition Flap Text: The defect edges were debeveled with a #15 scalpel blade.  Given the location of the defect and the proximity to free margins a bilobed transposition flap was deemed most appropriate.  Using a sterile surgical marker, an appropriate bilobe flap drawn around the defect.    The area thus outlined was incised deep to adipose tissue with a #15 scalpel blade.  The skin margins were undermined to an appropriate distance in all directions utilizing iris scissors.
Trilobed Flap Text: The defect edges were debeveled with a #15 scalpel blade.  Given the location of the defect and the proximity to free margins a trilobed flap was deemed most appropriate.  Using a sterile surgical marker, an appropriate trilobed flap drawn around the defect.    The area thus outlined was incised deep to adipose tissue with a #15 scalpel blade.  The skin margins were undermined to an appropriate distance in all directions utilizing iris scissors.
Dorsal Nasal Flap Text: The defect edges were debeveled with a #15 scalpel blade.  Given the location of the defect and the proximity to free margins a dorsal nasal flap was deemed most appropriate.  Using a sterile surgical marker, an appropriate dorsal nasal flap was drawn around the defect.    The area thus outlined was incised deep to adipose tissue with a #15 scalpel blade.  The skin margins were undermined to an appropriate distance in all directions utilizing iris scissors.
Island Pedicle Flap Text: The defect edges were debeveled with a #15 scalpel blade.  Given the location of the defect, shape of the defect and the proximity to free margins an island pedicle advancement flap was deemed most appropriate.  Using a sterile surgical marker, an appropriate advancement flap was drawn incorporating the defect, outlining the appropriate donor tissue and placing the expected incisions within the relaxed skin tension lines where possible.    The area thus outlined was incised deep to adipose tissue with a #15 scalpel blade.  The skin margins were undermined to an appropriate distance in all directions around the primary defect and laterally outward around the island pedicle utilizing iris scissors.  There was minimal undermining beneath the pedicle flap.
Island Pedicle Flap With Canthal Suspension Text: The defect edges were debeveled with a #15 scalpel blade.  Given the location of the defect, shape of the defect and the proximity to free margins an island pedicle advancement flap was deemed most appropriate.  Using a sterile surgical marker, an appropriate advancement flap was drawn incorporating the defect, outlining the appropriate donor tissue and placing the expected incisions within the relaxed skin tension lines where possible. The area thus outlined was incised deep to adipose tissue with a #15 scalpel blade.  The skin margins were undermined to an appropriate distance in all directions around the primary defect and laterally outward around the island pedicle utilizing iris scissors.  There was minimal undermining beneath the pedicle flap. A suspension suture was placed in the canthal tendon to prevent tension and prevent ectropion.
Alar Island Pedicle Flap Text: The defect edges were debeveled with a #15 scalpel blade.  Given the location of the defect, shape of the defect and the proximity to the alar rim an island pedicle advancement flap was deemed most appropriate.  Using a sterile surgical marker, an appropriate advancement flap was drawn incorporating the defect, outlining the appropriate donor tissue and placing the expected incisions within the nasal ala running parallel to the alar rim. The area thus outlined was incised with a #15 scalpel blade.  The skin margins were undermined minimally to an appropriate distance in all directions around the primary defect and laterally outward around the island pedicle utilizing iris scissors.  There was minimal undermining beneath the pedicle flap.
Double Island Pedicle Flap Text: The defect edges were debeveled with a #15 scalpel blade.  Given the location of the defect, shape of the defect and the proximity to free margins a double island pedicle advancement flap was deemed most appropriate.  Using a sterile surgical marker, an appropriate advancement flap was drawn incorporating the defect, outlining the appropriate donor tissue and placing the expected incisions within the relaxed skin tension lines where possible.    The area thus outlined was incised deep to adipose tissue with a #15 scalpel blade.  The skin margins were undermined to an appropriate distance in all directions around the primary defect and laterally outward around the island pedicle utilizing iris scissors.  There was minimal undermining beneath the pedicle flap.
Island Pedicle Flap-Requiring Vessel Identification Text: The defect edges were debeveled with a #15 scalpel blade.  Given the location of the defect, shape of the defect and the proximity to free margins an island pedicle advancement flap was deemed most appropriate.  Using a sterile surgical marker, an appropriate advancement flap was drawn, based on the axial vessel mentioned above, incorporating the defect, outlining the appropriate donor tissue and placing the expected incisions within the relaxed skin tension lines where possible.    The area thus outlined was incised deep to adipose tissue with a #15 scalpel blade.  The skin margins were undermined to an appropriate distance in all directions around the primary defect and laterally outward around the island pedicle utilizing iris scissors.  There was minimal undermining beneath the pedicle flap.
Keystone Flap Text: The defect edges were debeveled with a #15 scalpel blade.  Given the location of the defect, shape of the defect a keystone flap was deemed most appropriate.  Using a sterile surgical marker, an appropriate keystone flap was drawn incorporating the defect, outlining the appropriate donor tissue and placing the expected incisions within the relaxed skin tension lines where possible. The area thus outlined was incised deep to adipose tissue with a #15 scalpel blade.  The skin margins were undermined to an appropriate distance in all directions around the primary defect and laterally outward around the flap utilizing iris scissors.
O-T Plasty Text: The defect edges were debeveled with a #15 scalpel blade.  Given the location of the defect, shape of the defect and the proximity to free margins an O-T plasty was deemed most appropriate.  Using a sterile surgical marker, an appropriate O-T plasty was drawn incorporating the defect and placing the expected incisions within the relaxed skin tension lines where possible.    The area thus outlined was incised deep to adipose tissue with a #15 scalpel blade.  The skin margins were undermined to an appropriate distance in all directions utilizing iris scissors.
O-Z Plasty Text: The defect edges were debeveled with a #15 scalpel blade.  Given the location of the defect, shape of the defect and the proximity to free margins an O-Z plasty (double transposition flap) was deemed most appropriate.  Using a sterile surgical marker, the appropriate transposition flaps were drawn incorporating the defect and placing the expected incisions within the relaxed skin tension lines where possible.    The area thus outlined was incised deep to adipose tissue with a #15 scalpel blade.  The skin margins were undermined to an appropriate distance in all directions utilizing iris scissors.  Hemostasis was achieved with electrocautery.  The flaps were then transposed into place, one clockwise and the other counterclockwise, and anchored with interrupted buried subcutaneous sutures.
Double O-Z Plasty Text: The defect edges were debeveled with a #15 scalpel blade.  Given the location of the defect, shape of the defect and the proximity to free margins a Double O-Z plasty (double transposition flap) was deemed most appropriate.  Using a sterile surgical marker, the appropriate transposition flaps were drawn incorporating the defect and placing the expected incisions within the relaxed skin tension lines where possible. The area thus outlined was incised deep to adipose tissue with a #15 scalpel blade.  The skin margins were undermined to an appropriate distance in all directions utilizing iris scissors.  Hemostasis was achieved with electrocautery.  The flaps were then transposed into place, one clockwise and the other counterclockwise, and anchored with interrupted buried subcutaneous sutures.
V-Y Plasty Text: The defect edges were debeveled with a #15 scalpel blade.  Given the location of the defect, shape of the defect and the proximity to free margins an V-Y advancement flap was deemed most appropriate.  Using a sterile surgical marker, an appropriate advancement flap was drawn incorporating the defect and placing the expected incisions within the relaxed skin tension lines where possible.    The area thus outlined was incised deep to adipose tissue with a #15 scalpel blade.  The skin margins were undermined to an appropriate distance in all directions utilizing iris scissors.
H Plasty Text: Given the location of the defect, shape of the defect and the proximity to free margins a H-plasty was deemed most appropriate for repair.  Using a sterile surgical marker, the appropriate advancement arms of the H-plasty were drawn incorporating the defect and placing the expected incisions within the relaxed skin tension lines where possible. The area thus outlined was incised deep to adipose tissue with a #15 scalpel blade. The skin margins were undermined to an appropriate distance in all directions utilizing iris scissors.  The opposing advancement arms were then advanced into place in opposite direction and anchored with interrupted buried subcutaneous sutures.
W Plasty Text: The lesion was extirpated to the level of the fat with a #15 scalpel blade.  Given the location of the defect, shape of the defect and the proximity to free margins a W-plasty was deemed most appropriate for repair.  Using a sterile surgical marker, the appropriate transposition arms of the W-plasty were drawn incorporating the defect and placing the expected incisions within the relaxed skin tension lines where possible.    The area thus outlined was incised deep to adipose tissue with a #15 scalpel blade.  The skin margins were undermined to an appropriate distance in all directions utilizing iris scissors.  The opposing transposition arms were then transposed into place in opposite direction and anchored with interrupted buried subcutaneous sutures.
Z Plasty Text: The lesion was extirpated to the level of the fat with a #15 scalpel blade.  Given the location of the defect, shape of the defect and the proximity to free margins a Z-plasty was deemed most appropriate for repair.  Using a sterile surgical marker, the appropriate transposition arms of the Z-plasty were drawn incorporating the defect and placing the expected incisions within the relaxed skin tension lines where possible.    The area thus outlined was incised deep to adipose tissue with a #15 scalpel blade.  The skin margins were undermined to an appropriate distance in all directions utilizing iris scissors.  The opposing transposition arms were then transposed into place in opposite direction and anchored with interrupted buried subcutaneous sutures.
Zygomaticofacial Flap Text: Given the location of the defect, shape of the defect and the proximity to free margins a zygomaticofacial flap was deemed most appropriate for repair.  Using a sterile surgical marker, the appropriate flap was drawn incorporating the defect and placing the expected incisions within the relaxed skin tension lines where possible. The area thus outlined was incised deep to adipose tissue with a #15 scalpel blade with preservation of a vascular pedicle.  The skin margins were undermined to an appropriate distance in all directions utilizing iris scissors.  The flap was then placed into the defect and anchored with interrupted buried subcutaneous sutures.
Cheek Interpolation Flap Text: A decision was made to reconstruct the defect utilizing an interpolation axial flap and a staged reconstruction.  A telfa template was made of the defect.  This telfa template was then used to outline the Cheek Interpolation flap.  The donor area for the pedicle flap was then injected with anesthesia.  The flap was excised through the skin and subcutaneous tissue down to the layer of the underlying musculature.  The interpolation flap was carefully excised within this deep plane to maintain its blood supply.  The edges of the donor site were undermined.   The donor site was closed in a primary fashion.  The pedicle was then rotated into position and sutured.  Once the tube was sutured into place, adequate blood supply was confirmed with blanching and refill.  The pedicle was then wrapped with xeroform gauze and dressed appropriately with a telfa and gauze bandage to ensure continued blood supply and protect the attached pedicle.
Cheek-To-Nose Interpolation Flap Text: A decision was made to reconstruct the defect utilizing an interpolation axial flap and a staged reconstruction.  A telfa template was made of the defect.  This telfa template was then used to outline the Cheek-To-Nose Interpolation flap.  The donor area for the pedicle flap was then injected with anesthesia.  The flap was excised through the skin and subcutaneous tissue down to the layer of the underlying musculature.  The interpolation flap was carefully excised within this deep plane to maintain its blood supply.  The edges of the donor site were undermined.   The donor site was closed in a primary fashion.  The pedicle was then rotated into position and sutured.  Once the tube was sutured into place, adequate blood supply was confirmed with blanching and refill.  The pedicle was then wrapped with xeroform gauze and dressed appropriately with a telfa and gauze bandage to ensure continued blood supply and protect the attached pedicle.
Interpolation Flap Text: A decision was made to reconstruct the defect utilizing an interpolation axial flap and a staged reconstruction.  A telfa template was made of the defect.  This telfa template was then used to outline the interpolation flap.  The donor area for the pedicle flap was then injected with anesthesia.  The flap was excised through the skin and subcutaneous tissue down to the layer of the underlying musculature.  The interpolation flap was carefully excised within this deep plane to maintain its blood supply.  The edges of the donor site were undermined.   The donor site was closed in a primary fashion.  The pedicle was then rotated into position and sutured.  Once the tube was sutured into place, adequate blood supply was confirmed with blanching and refill.  The pedicle was then wrapped with xeroform gauze and dressed appropriately with a telfa and gauze bandage to ensure continued blood supply and protect the attached pedicle.
Melolabial Interpolation Flap Text: A decision was made to reconstruct the defect utilizing an interpolation axial flap and a staged reconstruction.  A telfa template was made of the defect.  This telfa template was then used to outline the melolabial interpolation flap.  The donor area for the pedicle flap was then injected with anesthesia.  The flap was excised through the skin and subcutaneous tissue down to the layer of the underlying musculature.  The pedicle flap was carefully excised within this deep plane to maintain its blood supply.  The edges of the donor site were undermined.   The donor site was closed in a primary fashion.  The pedicle was then rotated into position and sutured.  Once the tube was sutured into place, adequate blood supply was confirmed with blanching and refill.  The pedicle was then wrapped with xeroform gauze and dressed appropriately with a telfa and gauze bandage to ensure continued blood supply and protect the attached pedicle.
Mastoid Interpolation Flap Text: A decision was made to reconstruct the defect utilizing an interpolation axial flap and a staged reconstruction.  A telfa template was made of the defect.  This telfa template was then used to outline the mastoid interpolation flap.  The donor area for the pedicle flap was then injected with anesthesia.  The flap was excised through the skin and subcutaneous tissue down to the layer of the underlying musculature.  The pedicle flap was carefully excised within this deep plane to maintain its blood supply.  The edges of the donor site were undermined.   The donor site was closed in a primary fashion.  The pedicle was then rotated into position and sutured.  Once the tube was sutured into place, adequate blood supply was confirmed with blanching and refill.  The pedicle was then wrapped with xeroform gauze and dressed appropriately with a telfa and gauze bandage to ensure continued blood supply and protect the attached pedicle.
Posterior Auricular Interpolation Flap Text: A decision was made to reconstruct the defect utilizing an interpolation axial flap and a staged reconstruction.  A telfa template was made of the defect.  This telfa template was then used to outline the posterior auricular interpolation flap.  The donor area for the pedicle flap was then injected with anesthesia.  The flap was excised through the skin and subcutaneous tissue down to the layer of the underlying musculature.  The pedicle flap was carefully excised within this deep plane to maintain its blood supply.  The edges of the donor site were undermined.   The donor site was closed in a primary fashion.  The pedicle was then rotated into position and sutured.  Once the tube was sutured into place, adequate blood supply was confirmed with blanching and refill.  The pedicle was then wrapped with xeroform gauze and dressed appropriately with a telfa and gauze bandage to ensure continued blood supply and protect the attached pedicle.
Paramedian Forehead Flap Text: A decision was made to reconstruct the defect utilizing an interpolation axial flap and a staged reconstruction.  A telfa template was made of the defect.  This telfa template was then used to outline the paramedian forehead pedicle flap.  The donor area for the pedicle flap was then injected with anesthesia.  The flap was excised through the skin and subcutaneous tissue down to the layer of the underlying musculature.  The pedicle flap was carefully excised within this deep plane to maintain its blood supply.  The edges of the donor site were undermined.   The donor site was closed in a primary fashion.  The pedicle was then rotated into position and sutured.  Once the tube was sutured into place, adequate blood supply was confirmed with blanching and refill.  The pedicle was then wrapped with xeroform gauze and dressed appropriately with a telfa and gauze bandage to ensure continued blood supply and protect the attached pedicle.
Lip Wedge Excision Repair Text: Given the location of the defect and the proximity to free margins a full thickness wedge repair was deemed most appropriate.  Using a sterile surgical marker, the appropriate repair was drawn incorporating the defect and placing the expected incisions perpendicular to the vermilion border.  The vermilion border was also meticulously outlined to ensure appropriate reapproximation during the repair.  The area thus outlined was incised through and through with a #15 scalpel blade.  The muscularis and dermis were reaproximated with deep sutures following hemostasis. Care was taken to realign the vermilion border before proceeding with the superficial closure.  Once the vermilion was realigned the superfical and mucosal closure was finished.
Ftsg Text: The defect edges were debeveled with a #15 scalpel blade.  Given the location of the defect, shape of the defect and the proximity to free margins a full thickness skin graft was deemed most appropriate.  Using a sterile surgical marker, the primary defect shape was transferred to the donor site. The area thus outlined was incised deep to adipose tissue with a #15 scalpel blade.  The harvested graft was then trimmed of adipose tissue until only dermis and epidermis was left.  The skin margins of the secondary defect were undermined to an appropriate distance in all directions utilizing iris scissors.  The secondary defect was closed with interrupted buried subcutaneous sutures.  The skin edges were then re-apposed with running  sutures.  The skin graft was then placed in the primary defect and oriented appropriately.
Split-Thickness Skin Graft Text: The defect edges were debeveled with a #15 scalpel blade.  Given the location of the defect, shape of the defect and the proximity to free margins a split thickness skin graft was deemed most appropriate.  Using a sterile surgical marker, the primary defect shape was transferred to the donor site. The split thickness graft was then harvested.  The skin graft was then placed in the primary defect and oriented appropriately.
Burow's Graft Text: The defect edges were debeveled with a #15 scalpel blade.  Given the location of the defect, shape of the defect, the proximity to free margins and the presence of a standing cone deformity a Burow's skin graft was deemed most appropriate. The standing cone was removed and this tissue was then trimmed to the shape of the primary defect. The adipose tissue was also removed until only dermis and epidermis were left.  The skin margins of the secondary defect were undermined to an appropriate distance in all directions utilizing iris scissors.  The secondary defect was closed with interrupted buried subcutaneous sutures.  The skin edges were then re-apposed with running  sutures.  The skin graft was then placed in the primary defect and oriented appropriately.
Cartilage Graft Text: The defect edges were debeveled with a #15 scalpel blade.  Given the location of the defect, shape of the defect, the fact the defect involved a full thickness cartilage defect a cartilage graft was deemed most appropriate.  An appropriate donor site was identified, cleansed, and anesthetized. The cartilage graft was then harvested and transferred to the recipient site, oriented appropriately and then sutured into place.  The secondary defect was then repaired using a primary closure.
Composite Graft Text: The defect edges were debeveled with a #15 scalpel blade.  Given the location of the defect, shape of the defect, the proximity to free margins and the fact the defect was full thickness a composite graft was deemed most appropriate.  The defect was outline and then transferred to the donor site.  A full thickness graft was then excised from the donor site. The graft was then placed in the primary defect, oriented appropriately and then sutured into place.  The secondary defect was then repaired using a primary closure.
Epidermal Autograft Text: The defect edges were debeveled with a #15 scalpel blade.  Given the location of the defect, shape of the defect and the proximity to free margins an epidermal autograft was deemed most appropriate.  Using a sterile surgical marker, the primary defect shape was transferred to the donor site. The epidermal graft was then harvested.  The skin graft was then placed in the primary defect and oriented appropriately.
Dermal Autograft Text: The defect edges were debeveled with a #15 scalpel blade.  Given the location of the defect, shape of the defect and the proximity to free margins a dermal autograft was deemed most appropriate.  Using a sterile surgical marker, the primary defect shape was transferred to the donor site. The area thus outlined was incised deep to adipose tissue with a #15 scalpel blade.  The harvested graft was then trimmed of adipose and epidermal tissue until only dermis was left.  The skin graft was then placed in the primary defect and oriented appropriately.
Skin Substitute Text: The defect edges were debeveled with a #15 scalpel blade.  Given the location of the defect, shape of the defect and the proximity to free margins a skin substitute graft was deemed most appropriate.  The graft material was trimmed to fit the size of the defect. The graft was then placed in the primary defect and oriented appropriately.
Tissue Cultured Epidermal Autograft Text: The defect edges were debeveled with a #15 scalpel blade.  Given the location of the defect, shape of the defect and the proximity to free margins a tissue cultured epidermal autograft was deemed most appropriate.  The graft was then trimmed to fit the size of the defect.  The graft was then placed in the primary defect and oriented appropriately.
Xenograft Text: The defect edges were debeveled with a #15 scalpel blade.  Given the location of the defect, shape of the defect and the proximity to free margins a xenograft was deemed most appropriate.  The graft was then trimmed to fit the size of the defect.  The graft was then placed in the primary defect and oriented appropriately.
Purse String (Intermediate) Text: Given the location of the defect and the characteristics of the surrounding skin a purse string intermediate closure was deemed most appropriate.  Undermining was performed circumferentially around the surgical defect.  A purse string suture was then placed and tightened.
Purse String (Simple) Text: Given the location of the defect and the characteristics of the surrounding skin a purse string simple closure was deemed most appropriate.  Undermining was performed circumferentially around the surgical defect.  A purse string suture was then placed and tightened.
Complex Repair And Single Advancement Flap Text: The defect edges were debeveled with a #15 scalpel blade.  The primary defect was closed partially with a complex linear closure.  Given the location of the remaining defect, shape of the defect and the proximity to free margins a single advancement flap was deemed most appropriate for complete closure of the defect.  Using a sterile surgical marker, an appropriate advancement flap was drawn incorporating the defect and placing the expected incisions within the relaxed skin tension lines where possible.    The area thus outlined was incised deep to adipose tissue with a #15 scalpel blade.  The skin margins were undermined to an appropriate distance in all directions utilizing iris scissors.
Complex Repair And Double Advancement Flap Text: The defect edges were debeveled with a #15 scalpel blade.  The primary defect was closed partially with a complex linear closure.  Given the location of the remaining defect, shape of the defect and the proximity to free margins a double advancement flap was deemed most appropriate for complete closure of the defect.  Using a sterile surgical marker, an appropriate advancement flap was drawn incorporating the defect and placing the expected incisions within the relaxed skin tension lines where possible.    The area thus outlined was incised deep to adipose tissue with a #15 scalpel blade.  The skin margins were undermined to an appropriate distance in all directions utilizing iris scissors.
Complex Repair And Modified Advancement Flap Text: The defect edges were debeveled with a #15 scalpel blade.  The primary defect was closed partially with a complex linear closure.  Given the location of the remaining defect, shape of the defect and the proximity to free margins a modified advancement flap was deemed most appropriate for complete closure of the defect.  Using a sterile surgical marker, an appropriate advancement flap was drawn incorporating the defect and placing the expected incisions within the relaxed skin tension lines where possible.    The area thus outlined was incised deep to adipose tissue with a #15 scalpel blade.  The skin margins were undermined to an appropriate distance in all directions utilizing iris scissors.
Complex Repair And A-T Advancement Flap Text: The defect edges were debeveled with a #15 scalpel blade.  The primary defect was closed partially with a complex linear closure.  Given the location of the remaining defect, shape of the defect and the proximity to free margins an A-T advancement flap was deemed most appropriate for complete closure of the defect.  Using a sterile surgical marker, an appropriate advancement flap was drawn incorporating the defect and placing the expected incisions within the relaxed skin tension lines where possible.    The area thus outlined was incised deep to adipose tissue with a #15 scalpel blade.  The skin margins were undermined to an appropriate distance in all directions utilizing iris scissors.
Complex Repair And O-T Advancement Flap Text: The defect edges were debeveled with a #15 scalpel blade.  The primary defect was closed partially with a complex linear closure.  Given the location of the remaining defect, shape of the defect and the proximity to free margins an O-T advancement flap was deemed most appropriate for complete closure of the defect.  Using a sterile surgical marker, an appropriate advancement flap was drawn incorporating the defect and placing the expected incisions within the relaxed skin tension lines where possible.    The area thus outlined was incised deep to adipose tissue with a #15 scalpel blade.  The skin margins were undermined to an appropriate distance in all directions utilizing iris scissors.
Complex Repair And O-L Flap Text: The defect edges were debeveled with a #15 scalpel blade.  The primary defect was closed partially with a complex linear closure.  Given the location of the remaining defect, shape of the defect and the proximity to free margins an O-L flap was deemed most appropriate for complete closure of the defect.  Using a sterile surgical marker, an appropriate flap was drawn incorporating the defect and placing the expected incisions within the relaxed skin tension lines where possible.    The area thus outlined was incised deep to adipose tissue with a #15 scalpel blade.  The skin margins were undermined to an appropriate distance in all directions utilizing iris scissors.
Complex Repair And Bilobe Flap Text: The defect edges were debeveled with a #15 scalpel blade.  The primary defect was closed partially with a complex linear closure.  Given the location of the remaining defect, shape of the defect and the proximity to free margins a bilobe flap was deemed most appropriate for complete closure of the defect.  Using a sterile surgical marker, an appropriate advancement flap was drawn incorporating the defect and placing the expected incisions within the relaxed skin tension lines where possible.    The area thus outlined was incised deep to adipose tissue with a #15 scalpel blade.  The skin margins were undermined to an appropriate distance in all directions utilizing iris scissors.
Complex Repair And Melolabial Flap Text: The defect edges were debeveled with a #15 scalpel blade.  The primary defect was closed partially with a complex linear closure.  Given the location of the remaining defect, shape of the defect and the proximity to free margins a melolabial flap was deemed most appropriate for complete closure of the defect.  Using a sterile surgical marker, an appropriate advancement flap was drawn incorporating the defect and placing the expected incisions within the relaxed skin tension lines where possible.    The area thus outlined was incised deep to adipose tissue with a #15 scalpel blade.  The skin margins were undermined to an appropriate distance in all directions utilizing iris scissors.
Complex Repair And Rotation Flap Text: The defect edges were debeveled with a #15 scalpel blade.  The primary defect was closed partially with a complex linear closure.  Given the location of the remaining defect, shape of the defect and the proximity to free margins a rotation flap was deemed most appropriate for complete closure of the defect.  Using a sterile surgical marker, an appropriate advancement flap was drawn incorporating the defect and placing the expected incisions within the relaxed skin tension lines where possible.    The area thus outlined was incised deep to adipose tissue with a #15 scalpel blade.  The skin margins were undermined to an appropriate distance in all directions utilizing iris scissors.
Complex Repair And Rhombic Flap Text: The defect edges were debeveled with a #15 scalpel blade.  The primary defect was closed partially with a complex linear closure.  Given the location of the remaining defect, shape of the defect and the proximity to free margins a rhombic flap was deemed most appropriate for complete closure of the defect.  Using a sterile surgical marker, an appropriate advancement flap was drawn incorporating the defect and placing the expected incisions within the relaxed skin tension lines where possible.    The area thus outlined was incised deep to adipose tissue with a #15 scalpel blade.  The skin margins were undermined to an appropriate distance in all directions utilizing iris scissors.
Complex Repair And Transposition Flap Text: The defect edges were debeveled with a #15 scalpel blade.  The primary defect was closed partially with a complex linear closure.  Given the location of the remaining defect, shape of the defect and the proximity to free margins a transposition flap was deemed most appropriate for complete closure of the defect.  Using a sterile surgical marker, an appropriate advancement flap was drawn incorporating the defect and placing the expected incisions within the relaxed skin tension lines where possible.    The area thus outlined was incised deep to adipose tissue with a #15 scalpel blade.  The skin margins were undermined to an appropriate distance in all directions utilizing iris scissors.
Complex Repair And V-Y Plasty Text: The defect edges were debeveled with a #15 scalpel blade.  The primary defect was closed partially with a complex linear closure.  Given the location of the remaining defect, shape of the defect and the proximity to free margins a V-Y plasty was deemed most appropriate for complete closure of the defect.  Using a sterile surgical marker, an appropriate advancement flap was drawn incorporating the defect and placing the expected incisions within the relaxed skin tension lines where possible.    The area thus outlined was incised deep to adipose tissue with a #15 scalpel blade.  The skin margins were undermined to an appropriate distance in all directions utilizing iris scissors.
Complex Repair And M Plasty Text: The defect edges were debeveled with a #15 scalpel blade.  The primary defect was closed partially with a complex linear closure.  Given the location of the remaining defect, shape of the defect and the proximity to free margins an M plasty was deemed most appropriate for complete closure of the defect.  Using a sterile surgical marker, an appropriate advancement flap was drawn incorporating the defect and placing the expected incisions within the relaxed skin tension lines where possible.    The area thus outlined was incised deep to adipose tissue with a #15 scalpel blade.  The skin margins were undermined to an appropriate distance in all directions utilizing iris scissors.
Complex Repair And Double M Plasty Text: The defect edges were debeveled with a #15 scalpel blade.  The primary defect was closed partially with a complex linear closure.  Given the location of the remaining defect, shape of the defect and the proximity to free margins a double M plasty was deemed most appropriate for complete closure of the defect.  Using a sterile surgical marker, an appropriate advancement flap was drawn incorporating the defect and placing the expected incisions within the relaxed skin tension lines where possible.    The area thus outlined was incised deep to adipose tissue with a #15 scalpel blade.  The skin margins were undermined to an appropriate distance in all directions utilizing iris scissors.
Complex Repair And W Plasty Text: The defect edges were debeveled with a #15 scalpel blade.  The primary defect was closed partially with a complex linear closure.  Given the location of the remaining defect, shape of the defect and the proximity to free margins a W plasty was deemed most appropriate for complete closure of the defect.  Using a sterile surgical marker, an appropriate advancement flap was drawn incorporating the defect and placing the expected incisions within the relaxed skin tension lines where possible.    The area thus outlined was incised deep to adipose tissue with a #15 scalpel blade.  The skin margins were undermined to an appropriate distance in all directions utilizing iris scissors.
Complex Repair And Z Plasty Text: The defect edges were debeveled with a #15 scalpel blade.  The primary defect was closed partially with a complex linear closure.  Given the location of the remaining defect, shape of the defect and the proximity to free margins a Z plasty was deemed most appropriate for complete closure of the defect.  Using a sterile surgical marker, an appropriate advancement flap was drawn incorporating the defect and placing the expected incisions within the relaxed skin tension lines where possible.    The area thus outlined was incised deep to adipose tissue with a #15 scalpel blade.  The skin margins were undermined to an appropriate distance in all directions utilizing iris scissors.
Complex Repair And Dorsal Nasal Flap Text: The defect edges were debeveled with a #15 scalpel blade.  The primary defect was closed partially with a complex linear closure.  Given the location of the remaining defect, shape of the defect and the proximity to free margins a dorsal nasal flap was deemed most appropriate for complete closure of the defect.  Using a sterile surgical marker, an appropriate flap was drawn incorporating the defect and placing the expected incisions within the relaxed skin tension lines where possible.    The area thus outlined was incised deep to adipose tissue with a #15 scalpel blade.  The skin margins were undermined to an appropriate distance in all directions utilizing iris scissors.
Complex Repair And Ftsg Text: The defect edges were debeveled with a #15 scalpel blade.  The primary defect was closed partially with a complex linear closure.  Given the location of the defect, shape of the defect and the proximity to free margins a full thickness skin graft was deemed most appropriate to repair the remaining defect.  The graft was trimmed to fit the size of the remaining defect.  The graft was then placed in the primary defect, oriented appropriately, and sutured into place.
Complex Repair And Burow's Graft Text: The defect edges were debeveled with a #15 scalpel blade.  The primary defect was closed partially with a complex linear closure.  Given the location of the defect, shape of the defect, the proximity to free margins and the presence of a standing cone deformity a Burow's graft was deemed most appropriate to repair the remaining defect.  The graft was trimmed to fit the size of the remaining defect.  The graft was then placed in the primary defect, oriented appropriately, and sutured into place.
Complex Repair And Split-Thickness Skin Graft Text: The defect edges were debeveled with a #15 scalpel blade.  The primary defect was closed partially with a complex linear closure.  Given the location of the defect, shape of the defect and the proximity to free margins a split thickness skin graft was deemed most appropriate to repair the remaining defect.  The graft was trimmed to fit the size of the remaining defect.  The graft was then placed in the primary defect, oriented appropriately, and sutured into place.
Complex Repair And Epidermal Autograft Text: The defect edges were debeveled with a #15 scalpel blade.  The primary defect was closed partially with a complex linear closure.  Given the location of the defect, shape of the defect and the proximity to free margins an epidermal autograft was deemed most appropriate to repair the remaining defect.  The graft was trimmed to fit the size of the remaining defect.  The graft was then placed in the primary defect, oriented appropriately, and sutured into place.
Complex Repair And Dermal Autograft Text: The defect edges were debeveled with a #15 scalpel blade.  The primary defect was closed partially with a complex linear closure.  Given the location of the defect, shape of the defect and the proximity to free margins an dermal autograft was deemed most appropriate to repair the remaining defect.  The graft was trimmed to fit the size of the remaining defect.  The graft was then placed in the primary defect, oriented appropriately, and sutured into place.
Complex Repair And Tissue Cultured Epidermal Autograft Text: The defect edges were debeveled with a #15 scalpel blade.  The primary defect was closed partially with a complex linear closure.  Given the location of the defect, shape of the defect and the proximity to free margins an tissue cultured epidermal autograft was deemed most appropriate to repair the remaining defect.  The graft was trimmed to fit the size of the remaining defect.  The graft was then placed in the primary defect, oriented appropriately, and sutured into place.
Complex Repair And Xenograft Text: The defect edges were debeveled with a #15 scalpel blade.  The primary defect was closed partially with a complex linear closure.  Given the location of the defect, shape of the defect and the proximity to free margins a xenograft was deemed most appropriate to repair the remaining defect.  The graft was trimmed to fit the size of the remaining defect.  The graft was then placed in the primary defect, oriented appropriately, and sutured into place.
Complex Repair And Skin Substitute Graft Text: The defect edges were debeveled with a #15 scalpel blade.  The primary defect was closed partially with a complex linear closure.  Given the location of the remaining defect, shape of the defect and the proximity to free margins a skin substitute graft was deemed most appropriate to repair the remaining defect.  The graft was trimmed to fit the size of the remaining defect.  The graft was then placed in the primary defect, oriented appropriately, and sutured into place.
Consent was obtained from the patient. The risks and benefits to therapy were discussed in detail. Specifically, the risks of infection, scarring, bleeding, prolonged wound healing, incomplete removal, allergy to anesthesia, nerve injury and recurrence were addressed. Prior to the procedure, the treatment site was clearly identified and confirmed by the patient. All components of Universal Protocol/PAUSE Rule completed.
Post-Care Instructions: I reviewed with the patient in detail post-care instructions. Patient is not to engage in any heavy lifting, exercise, or swimming for the next 14 days. Should the patient develop any fevers, chills, bleeding, severe pain patient will contact the office immediately.
Home Suture Removal Text: Patient was provided a home suture removal kit and will remove their sutures at home.  If they have any questions or difficulties they will call the office.
Where Do You Want The Question To Include Opioid Counseling Located?: Case Summary Tab
Billing Type: Third-Party Bill
Information: Selecting Yes will display possible errors in your note based on the variables you have selected. This validation is only offered as a suggestion for you. PLEASE NOTE THAT THE VALIDATION TEXT WILL BE REMOVED WHEN YOU FINALIZE YOUR NOTE. IF YOU WANT TO FAX A PRELIMINARY NOTE YOU WILL NEED TO TOGGLE THIS TO 'NO' IF YOU DO NOT WANT IT IN YOUR FAXED NOTE.

## 2021-05-22 DIAGNOSIS — E78.00 HYPERCHOLESTEROLEMIA: ICD-10-CM

## 2021-05-24 RX ORDER — SIMVASTATIN 40 MG
TABLET ORAL
Qty: 90 TABLET | Refills: 3 | Status: SHIPPED | OUTPATIENT
Start: 2021-05-24 | End: 2021-06-03 | Stop reason: SDUPTHER

## 2021-05-27 DIAGNOSIS — E78.00 HYPERCHOLESTEROLEMIA: ICD-10-CM

## 2021-05-27 RX ORDER — SIMVASTATIN 40 MG
TABLET ORAL
Qty: 90 TABLET | Refills: 0 | OUTPATIENT
Start: 2021-05-27

## 2021-06-01 ENCOUNTER — OFFICE VISIT (OUTPATIENT)
Dept: SLEEP MEDICINE | Facility: MEDICAL CENTER | Age: 66
End: 2021-06-01
Payer: COMMERCIAL

## 2021-06-01 VITALS
OXYGEN SATURATION: 96 % | BODY MASS INDEX: 28.17 KG/M2 | SYSTOLIC BLOOD PRESSURE: 120 MMHG | WEIGHT: 159 LBS | RESPIRATION RATE: 16 BRPM | DIASTOLIC BLOOD PRESSURE: 62 MMHG | HEIGHT: 63 IN | HEART RATE: 88 BPM

## 2021-06-01 DIAGNOSIS — R01.1 HEART MURMUR: ICD-10-CM

## 2021-06-01 DIAGNOSIS — G47.33 OSA ON CPAP: ICD-10-CM

## 2021-06-01 DIAGNOSIS — Z87.891 FORMER SMOKER: ICD-10-CM

## 2021-06-01 PROCEDURE — 99214 OFFICE O/P EST MOD 30 MIN: CPT | Performed by: NURSE PRACTITIONER

## 2021-06-01 ASSESSMENT — FIBROSIS 4 INDEX: FIB4 SCORE: 0.86

## 2021-06-01 NOTE — PROGRESS NOTES
Chief Complaint   Patient presents with   • Apnea     last seen 6/4/2020        HPI:  Lizy Merlos is a 66 y.o. year old female here today for follow-up on PRAVEEN.  Last oV 6/4/2020 with Dr. Montana.     Using CPAP 16cm H20. Device obtained 2020.  Compliance report 5/2/21-5/31/21 notes 96.7% compliance, avg nightly use of 8hrs, mask leak 1.5hrs and overall AHI 8.4/hr. Mask leak has improved in the last 2 weeks. Detailed report notes elevated OA and hypopneas. Reviewed with patient. She feels she is sleeping well on therapy without complaint. Tolerating mask and pressure well; she recently replaced her mask. Denies AM headaches. No napping or daytime sleepiness. She would like to discuss options for CPAP use while camping.    SLEEP HX:  A split-night polysomnogram on December 17, 2019 demonstrated an apnea hypopnea index of 55.1 events per hour with a lowest arterial oxygen saturation of 78%.  She was titrated to CPAP at 16 cm water pressure where the apnea hypopnea index fell to 0.5 events per hour with a lowest arterial oxygen saturation of 94% on room air.    Hx of back pain; last surgery 12/2020 with 7 discs fused by Dr. Hirsch. Hx of bulging discs.  Hx of COVID-19 August 2020 and now fully recovered but had ongoing symptoms for months.  She denies cardiac or respiratory symptoms.      ROS: As per HPI and otherwise negative if not stated.    Past Medical History:   Diagnosis Date   • Alcohol abuse    • Anxiety disorder 09/2019   • Arthritis 09/2019    hips, spine, hands   • Depression 09/2019   • High cholesterol    • Liver disease    • Restless leg syndrome    • Sleep apnea     cpap   • Snoring    • Urinary incontinence        Past Surgical History:   Procedure Laterality Date   • PB RUDOLPH W/O FACETEC FORAMOT/[facility] 1/2 VRT SEG, CE*  12/18/2020    Procedure: LAMINECTOMY, SPINE, CERVICAL, POSTERIOR APPROACH-STAGE#3 C6-T1 LAMI AND C5-7 FACETECTOMY;  Surgeon: Lavell Rincon M.D.;  Location: SURGERY [facility];   Service: Neurosurgery   • CERVICAL DISK AND FUSION ANTERIOR  12/18/2020    Procedure: DISCECTOMY, SPINE, CERVICAL, ANTERIOR APPROACH, WITH FUSION-STAGE #1 C3-5 DISCECTOMY WITH RECURRENT LARYNGEAL NERVE MONITORING SSEPS;  Surgeon: Lavell Rincon M.D.;  Location: [facility];  Service: Neurosurgery   • CORPECTOMY  12/18/2020    Procedure: CORPECTOMY, SPINE-ANTERIOR C4;  Surgeon: Lavell Rincon M.D.;  Location: [facility];  Service: Neurosurgery   • POSTERIOR CERVICAL FUSION O-ARM  12/18/2020    Procedure: FUSION, SPINE, CERVICAL, POSTERIOR APPROACH, WITH O-ARM IMAGING GUIDANCE-STAGE #2 C3-C6, C7-T3 LATERAL FUSION;  Surgeon: Lavell Rincon M.D.;  Location: [facility];  Service: Neurosurgery   • NECK EXPLORATION Right 12/18/2020    Procedure: EXPLORATION, NECK AND CERVICAL SPINE EXPOSURE;  Surgeon: Lavell Rincon M.D.;  Location: [facility];  Service: Neurosurgery   • PB SHLDR ARTHROSCOP,SURG,W/ROTAT CUFF REPB Left 10/1/2019    Procedure: ARTHROSCOPY, SHOULDER, WITH ROTATOR CUFF REPAIR;  Surgeon: Lavell Hooks M.D.;  Location: [facility];  Service: Orthopedics   • PB SHLDR ARTHROSCOP,PART ACROMIOPLAS Left 10/1/2019    Procedure: DECOMPRESSION, SHOULDER, ARTHROSCOPIC - SUBACROMIAL W/LABRAL DEBRIDEMENT;  Surgeon: Lavell Hooks M.D.;  Location: [facility];  Service: Orthopedics   • PB ARTHROSCOPY SHOULDER SURGICAL BICEPS TENODES* Left 10/1/2019    Procedure: ARTHROSCOPY, SHOULDER, WITH BICEPS TENODESIS - W/OPEN SUBPECTORAL TENODESIS, PROCEED AS INDICATED;  Surgeon: Lavell Hooks M.D.;  Location: [facility];  Service: Orthopedics   • CERVICAL DISK AND FUSION ANTERIOR  1993 and 1994   • PRIMARY C SECTION      twice    • SHOULDER SURGERY Left     Rotator cuff   • SINUSCOPE     • TONSILLECTOMY         Family History   Problem Relation Age of Onset   • Dementia Mother    • Heart Disease Mother         pacemaker   •  Heart Disease Father         congestive heart failure   • Alcohol/Drug Paternal Uncle    • Dementia Maternal Grandmother    • Depression Maternal Grandmother    • Alcohol/Drug Maternal Grandfather    • Stroke Paternal Grandmother    • Heart Attack Paternal Grandfather        Social History     Socioeconomic History   • Marital status:      Spouse name: Not on file   • Number of children: 2   • Years of education: Not on file   • Highest education level: Not on file   Occupational History   • Occupation: [employer] tech   Tobacco Use   • Smoking status: Former Smoker     Packs/day: 1.00     Years: 20.00     Pack years: 20.00     Types: Cigarettes     Quit date:      Years since quittin.4   • Smokeless tobacco: Never Used   Vaping Use   • Vaping Use: Never used   Substance and Sexual Activity   • Alcohol use: Not Currently     Comment: former alcoholic, stopped 2016   • Drug use: Not Currently     Types: Marijuana, Cocaine   • Sexual activity: Yes     Partners: Male     Birth control/protection: Post-Menopausal     Comment: spouse   Other Topics Concern   • Not on file   Social History Narrative   • Not on file     Social Determinants of Health     Financial Resource Strain:    • Difficulty of Paying Living Expenses:    Food Insecurity:    • Worried About Running Out of Food in the Last Year:    • Ran Out of Food in the Last Year:    Transportation Needs:    • Lack of Transportation (Medical):    • Lack of Transportation (Non-Medical):    Physical Activity:    • Days of Exercise per Week:    • Minutes of Exercise per Session:    Stress:    • Feeling of Stress :    Social Connections:    • Frequency of Communication with Friends and Family:    • Frequency of Social Gatherings with Friends and Family:    • Attends Nondenominational Services:    • Active Member of Clubs or Organizations:    • Attends Club or Organization Meetings:    • Marital Status:    Intimate Partner Violence:    • Fear of Current or  "Ex-Partner:    • Emotionally Abused:    • Physically Abused:    • Sexually Abused:        Allergies as of 06/01/2021   • (No Known Allergies)        Vitals:  /62 (BP Location: Left arm, Patient Position: Sitting, BP Cuff Size: Adult)   Pulse 88   Resp 16   Ht 1.6 m (5' 3\")   Wt 72.1 kg (159 lb)   SpO2 96%     Current medications as of today   Current Outpatient Medications   Medication Sig Dispense Refill   • simvastatin (ZOCOR) 40 MG Tab TAKE ONE TABLET BY MOUTH EVERY EVENING 90 tablet 3   • escitalopram (LEXAPRO) 20 MG tablet Take 1 tablet by mouth every day. 90 tablet 1   • busPIRone (BUSPAR) 15 MG tablet Take 1 tablet by mouth 2 times a day. 180 tablet 1   • pregabalin (LYRICA) 50 MG capsule Take 1 capsule by mouth 2 times a day for 180 days. 60 capsule 5   • meloxicam (MOBIC) 15 MG tablet Take 1 tablet by mouth 1 time a day as needed. With foods 60 tablet 0   • CALCIUM PO Take 1 Dose by mouth every day.     • POTASSIUM PO Take 1 Dose by mouth every day.     • Cyanocobalamin (B-12 PO) Take  by mouth.       No current facility-administered medications for this visit.         Physical Exam:   Gen:           Alert and oriented, No apparent distress. Mood and affect appropriate, normal interaction with examiner.  Eyes:          PERRL, EOM intact, sclere white, conjunctive moist.  Ears:          Not examined.   Hearing:     Grossly intact.  Nose:          Normal, no lesions or deformities.  Dentition:    mask  Oropharynx:   mask  Mallampati Classification: mask  Neck:        Supple, trachea midline, no masses.  Respiratory Effort: No intercostal retractions or use of accessory muscles.   Lung Auscultation:      Clear to auscultation bilaterally; no rales, rhonchi or wheezing.  CV:            Regular rate and rhythm. No murmurs, rubs or gallops. Hx of heart murmur.  Abd:           Not examined.   Lymphadenopathy: Not examined.  Gait and Station: Normal.  Digits and Nails: No clubbing, cyanosis, petechiae, " or nodes.   Cranial Nerves: II-XII grossly intact.  Skin:        No rashes, lesions or ulcers noted.               Ext:           No cyanosis or edema.      Assessment:  1. PRAVEEN on CPAP     2. Heart murmur     3. BMI 28.0-28.9,adult     4. Former smoker         Immunizations:    Flu:9/2020  Pneumovax 23:recommend  Prevnar 13:10/2019  COVID-19: 3/23/21, 3/2/21    Plan:  1. PRAVEEN is clinically stable and  Patient is happy with settings. Continue CPAP 16cm nightly.  DME mask/supplies.  DME CPAP; travel cpap  DME other; battery packs for camping  2. Discussed sleep hygiene  3. F/u with PCP for other health concerns  4. F/u in 1 year with compliance report, sooner if needed.    Please note that this dictation was created using voice recognition software. I have made every reasonable attempt to correct obvious errors, but it is possible there are errors of grammar and possibly content that I did not discover before finalizing the note.

## 2021-06-01 NOTE — PATIENT INSTRUCTIONS
[email or web address]  Solar power batteries for cpap use  Travel cpap through preferred  Tongue sleeve - [employer]

## 2021-06-03 DIAGNOSIS — M54.42 CHRONIC BILATERAL LOW BACK PAIN WITH BILATERAL SCIATICA: ICD-10-CM

## 2021-06-03 DIAGNOSIS — G89.29 CHRONIC BILATERAL LOW BACK PAIN WITH BILATERAL SCIATICA: ICD-10-CM

## 2021-06-03 DIAGNOSIS — M54.41 CHRONIC BILATERAL LOW BACK PAIN WITH BILATERAL SCIATICA: ICD-10-CM

## 2021-06-03 DIAGNOSIS — E78.00 HYPERCHOLESTEROLEMIA: ICD-10-CM

## 2021-06-04 RX ORDER — SIMVASTATIN 40 MG
TABLET ORAL
Qty: 90 TABLET | Refills: 3 | Status: SHIPPED | OUTPATIENT
Start: 2021-06-04 | End: 2022-05-27

## 2021-06-04 RX ORDER — PREGABALIN 50 MG/1
50 CAPSULE ORAL 2 TIMES DAILY
Qty: 60 CAPSULE | Refills: 0 | OUTPATIENT
Start: 2021-06-04 | End: 2021-12-01

## 2021-06-04 NOTE — TELEPHONE ENCOUNTER
Phone Number Called: [phone] (home)     Call outcome: Did not leave a detailed message. Requested patient to call back.    Message: 1st attempt

## 2021-06-07 NOTE — TELEPHONE ENCOUNTER
Phone Number Called: [phone] (home)       Call outcome: Spoke to patient regarding message below.    Message: appt scheduled

## 2021-06-08 ENCOUNTER — OFFICE VISIT (OUTPATIENT)
Dept: MEDICAL GROUP | Age: 66
End: 2021-06-08
Payer: COMMERCIAL

## 2021-06-08 VITALS
WEIGHT: 159 LBS | TEMPERATURE: 97.7 F | DIASTOLIC BLOOD PRESSURE: 64 MMHG | HEIGHT: 63 IN | OXYGEN SATURATION: 97 % | SYSTOLIC BLOOD PRESSURE: 112 MMHG | HEART RATE: 56 BPM | BODY MASS INDEX: 28.17 KG/M2

## 2021-06-08 DIAGNOSIS — E78.00 HYPERCHOLESTEROLEMIA: ICD-10-CM

## 2021-06-08 DIAGNOSIS — F41.8 DEPRESSION WITH ANXIETY: ICD-10-CM

## 2021-06-08 DIAGNOSIS — G89.29 CHRONIC BILATERAL LOW BACK PAIN WITH BILATERAL SCIATICA: ICD-10-CM

## 2021-06-08 DIAGNOSIS — M54.42 CHRONIC BILATERAL LOW BACK PAIN WITH BILATERAL SCIATICA: ICD-10-CM

## 2021-06-08 DIAGNOSIS — G47.33 OSA ON CPAP: Chronic | ICD-10-CM

## 2021-06-08 DIAGNOSIS — M54.12 CERVICAL RADICULOPATHY: Primary | ICD-10-CM

## 2021-06-08 DIAGNOSIS — M54.41 CHRONIC BILATERAL LOW BACK PAIN WITH BILATERAL SCIATICA: ICD-10-CM

## 2021-06-08 PROBLEM — F41.1 GAD (GENERALIZED ANXIETY DISORDER): Status: RESOLVED | Noted: 2020-05-01 | Resolved: 2021-06-08

## 2021-06-08 PROBLEM — F33.41 RECURRENT MAJOR DEPRESSIVE DISORDER, IN PARTIAL REMISSION (HCC): Status: RESOLVED | Noted: 2021-04-21 | Resolved: 2021-06-08

## 2021-06-08 PROCEDURE — 99214 OFFICE O/P EST MOD 30 MIN: CPT | Performed by: FAMILY MEDICINE

## 2021-06-08 RX ORDER — PREGABALIN 50 MG/1
50 CAPSULE ORAL 2 TIMES DAILY
Qty: 60 CAPSULE | Refills: 5 | Status: SHIPPED | OUTPATIENT
Start: 2021-06-08 | End: 2021-11-02

## 2021-06-08 ASSESSMENT — PATIENT HEALTH QUESTIONNAIRE - PHQ9
SUM OF ALL RESPONSES TO PHQ9 QUESTIONS 1 AND 2: 0
3. TROUBLE FALLING OR STAYING ASLEEP OR SLEEPING TOO MUCH: NOT AT ALL
6. FEELING BAD ABOUT YOURSELF - OR THAT YOU ARE A FAILURE OR HAVE LET YOURSELF OR YOUR FAMILY DOWN: NOT AL ALL
9. THOUGHTS THAT YOU WOULD BE BETTER OFF DEAD, OR OF HURTING YOURSELF: NOT AT ALL
2. FEELING DOWN, DEPRESSED, IRRITABLE, OR HOPELESS: NOT AT ALL
7. TROUBLE CONCENTRATING ON THINGS, SUCH AS READING THE NEWSPAPER OR WATCHING TELEVISION: NOT AT ALL
SUM OF ALL RESPONSES TO PHQ QUESTIONS 1-9: 0
4. FEELING TIRED OR HAVING LITTLE ENERGY: NOT AT ALL
8. MOVING OR SPEAKING SO SLOWLY THAT OTHER PEOPLE COULD HAVE NOTICED. OR THE OPPOSITE, BEING SO FIGETY OR RESTLESS THAT YOU HAVE BEEN MOVING AROUND A LOT MORE THAN USUAL: NOT AT ALL
5. POOR APPETITE OR OVEREATING: NOT AT ALL
1. LITTLE INTEREST OR PLEASURE IN DOING THINGS: NOT AT ALL

## 2021-06-08 ASSESSMENT — FIBROSIS 4 INDEX: FIB4 SCORE: 0.86
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
## 2021-06-10 DIAGNOSIS — F41.1 GAD (GENERALIZED ANXIETY DISORDER): ICD-10-CM

## 2021-06-10 DIAGNOSIS — F33.41 RECURRENT MAJOR DEPRESSIVE DISORDER, IN PARTIAL REMISSION (HCC): ICD-10-CM

## 2021-06-10 RX ORDER — ESCITALOPRAM OXALATE 20 MG/1
20 TABLET ORAL DAILY
Qty: 90 TABLET | Refills: 1 | Status: SHIPPED | OUTPATIENT
Start: 2021-06-10 | End: 2021-06-24 | Stop reason: SDUPTHER

## 2021-06-24 DIAGNOSIS — F41.1 GAD (GENERALIZED ANXIETY DISORDER): ICD-10-CM

## 2021-06-24 DIAGNOSIS — F33.41 RECURRENT MAJOR DEPRESSIVE DISORDER, IN PARTIAL REMISSION (HCC): ICD-10-CM

## 2021-06-24 RX ORDER — ESCITALOPRAM OXALATE 20 MG/1
20 TABLET ORAL DAILY
Qty: 90 TABLET | Refills: 1 | Status: SHIPPED | OUTPATIENT
Start: 2021-06-24 | End: 2021-10-21 | Stop reason: SDUPTHER

## 2021-06-24 RX ORDER — BUSPIRONE HYDROCHLORIDE 15 MG/1
15 TABLET ORAL 2 TIMES DAILY
Qty: 180 TABLET | Refills: 1 | Status: SHIPPED | OUTPATIENT
Start: 2021-06-24 | End: 2021-10-21 | Stop reason: SDUPTHER

## 2021-07-24 ENCOUNTER — HOSPITAL ENCOUNTER (OUTPATIENT)
Dept: LAB | Facility: MEDICAL CENTER | Age: 66
End: 2021-07-24
Attending: FAMILY MEDICINE
Payer: COMMERCIAL

## 2021-07-24 DIAGNOSIS — Z00.00 PE (PHYSICAL EXAM), ANNUAL: ICD-10-CM

## 2021-07-24 LAB
ALBUMIN SERPL BCP-MCNC: 4.4 G/DL (ref 3.2–4.9)
ALBUMIN/GLOB SERPL: 1.6 G/DL
ALP SERPL-CCNC: 90 U/L (ref 30–99)
ALT SERPL-CCNC: 21 U/L (ref 2–50)
ANION GAP SERPL CALC-SCNC: 12 MMOL/L (ref 7–16)
AST SERPL-CCNC: 22 U/L (ref 12–45)
BASOPHILS # BLD AUTO: 1.1 % (ref 0–1.8)
BASOPHILS # BLD: 0.06 K/UL (ref 0–0.12)
BILIRUB SERPL-MCNC: 0.4 MG/DL (ref 0.1–1.5)
BUN SERPL-MCNC: 15 MG/DL (ref 8–22)
CALCIUM SERPL-MCNC: 9.4 MG/DL (ref 8.5–10.5)
CHLORIDE SERPL-SCNC: 106 MMOL/L (ref 96–112)
CHOLEST SERPL-MCNC: 140 MG/DL (ref 100–199)
CO2 SERPL-SCNC: 23 MMOL/L (ref 20–33)
CREAT SERPL-MCNC: 0.7 MG/DL (ref 0.5–1.4)
EOSINOPHIL # BLD AUTO: 0.17 K/UL (ref 0–0.51)
EOSINOPHIL NFR BLD: 3 % (ref 0–6.9)
ERYTHROCYTE [DISTWIDTH] IN BLOOD BY AUTOMATED COUNT: 46.5 FL (ref 35.9–50)
EST. AVERAGE GLUCOSE BLD GHB EST-MCNC: 117 MG/DL
FASTING STATUS PATIENT QL REPORTED: NORMAL
GLOBULIN SER CALC-MCNC: 2.7 G/DL (ref 1.9–3.5)
GLUCOSE SERPL-MCNC: 111 MG/DL (ref 65–99)
HBA1C MFR BLD: 5.7 % (ref 4–5.6)
HCT VFR BLD AUTO: 42 % (ref 37–47)
HDLC SERPL-MCNC: 49 MG/DL
HGB BLD-MCNC: 13.6 G/DL (ref 12–16)
IMM GRANULOCYTES # BLD AUTO: 0.02 K/UL (ref 0–0.11)
IMM GRANULOCYTES NFR BLD AUTO: 0.4 % (ref 0–0.9)
LDLC SERPL CALC-MCNC: 73 MG/DL
LYMPHOCYTES # BLD AUTO: 1.31 K/UL (ref 1–4.8)
LYMPHOCYTES NFR BLD: 23.4 % (ref 22–41)
MCH RBC QN AUTO: 29.6 PG (ref 27–33)
MCHC RBC AUTO-ENTMCNC: 32.4 G/DL (ref 33.6–35)
MCV RBC AUTO: 91.5 FL (ref 81.4–97.8)
MONOCYTES # BLD AUTO: 0.41 K/UL (ref 0–0.85)
MONOCYTES NFR BLD AUTO: 7.3 % (ref 0–13.4)
NEUTROPHILS # BLD AUTO: 3.62 K/UL (ref 2–7.15)
NEUTROPHILS NFR BLD: 64.8 % (ref 44–72)
NRBC # BLD AUTO: 0 K/UL
NRBC BLD-RTO: 0 /100 WBC
PLATELET # BLD AUTO: 355 K/UL (ref 164–446)
PMV BLD AUTO: 9.5 FL (ref 9–12.9)
POTASSIUM SERPL-SCNC: 4.5 MMOL/L (ref 3.6–5.5)
PROT SERPL-MCNC: 7.1 G/DL (ref 6–8.2)
RBC # BLD AUTO: 4.59 M/UL (ref 4.2–5.4)
SODIUM SERPL-SCNC: 141 MMOL/L (ref 135–145)
TRIGL SERPL-MCNC: 89 MG/DL (ref 0–149)
WBC # BLD AUTO: 5.6 K/UL (ref 4.8–10.8)

## 2021-07-24 PROCEDURE — 80061 LIPID PANEL: CPT

## 2021-07-24 PROCEDURE — 80053 COMPREHEN METABOLIC PANEL: CPT

## 2021-07-24 PROCEDURE — 85025 COMPLETE CBC W/AUTO DIFF WBC: CPT

## 2021-07-24 PROCEDURE — 83036 HEMOGLOBIN GLYCOSYLATED A1C: CPT

## 2021-07-24 PROCEDURE — 36415 COLL VENOUS BLD VENIPUNCTURE: CPT

## 2021-07-27 PROBLEM — R73.03 PREDIABETES: Status: ACTIVE | Noted: 2021-07-27

## 2021-10-21 ENCOUNTER — TELEMEDICINE (OUTPATIENT)
Dept: BEHAVIORAL HEALTH | Facility: CLINIC | Age: 66
End: 2021-10-21
Payer: COMMERCIAL

## 2021-10-21 DIAGNOSIS — F33.42 RECURRENT MAJOR DEPRESSIVE DISORDER, IN FULL REMISSION (HCC): ICD-10-CM

## 2021-10-21 DIAGNOSIS — F33.41 RECURRENT MAJOR DEPRESSIVE DISORDER, IN PARTIAL REMISSION (HCC): ICD-10-CM

## 2021-10-21 DIAGNOSIS — F41.1 GAD (GENERALIZED ANXIETY DISORDER): ICD-10-CM

## 2021-10-21 PROCEDURE — 99214 OFFICE O/P EST MOD 30 MIN: CPT | Mod: 95 | Performed by: PSYCHIATRY & NEUROLOGY

## 2021-10-21 RX ORDER — BUSPIRONE HYDROCHLORIDE 15 MG/1
15 TABLET ORAL 2 TIMES DAILY
Qty: 180 TABLET | Refills: 1 | Status: SHIPPED | OUTPATIENT
Start: 2021-10-21 | End: 2022-04-25

## 2021-10-21 RX ORDER — ESCITALOPRAM OXALATE 20 MG/1
20 TABLET ORAL DAILY
Qty: 90 TABLET | Refills: 1 | Status: SHIPPED | OUTPATIENT
Start: 2021-10-21 | End: 2022-04-25
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
## 2021-11-02 ENCOUNTER — OFFICE VISIT (OUTPATIENT)
Dept: MEDICAL GROUP | Age: 66
End: 2021-11-02
Payer: COMMERCIAL

## 2021-11-02 VITALS
HEIGHT: 63 IN | OXYGEN SATURATION: 97 % | BODY MASS INDEX: 29.23 KG/M2 | TEMPERATURE: 98.5 F | DIASTOLIC BLOOD PRESSURE: 66 MMHG | SYSTOLIC BLOOD PRESSURE: 130 MMHG | WEIGHT: 165 LBS | HEART RATE: 63 BPM

## 2021-11-02 DIAGNOSIS — N95.1 VASOMOTOR SYMPTOMS DUE TO MENOPAUSE: ICD-10-CM

## 2021-11-02 DIAGNOSIS — Z13.820 SCREENING FOR OSTEOPOROSIS: ICD-10-CM

## 2021-11-02 DIAGNOSIS — G89.29 CHRONIC BILATERAL LOW BACK PAIN WITH BILATERAL SCIATICA: ICD-10-CM

## 2021-11-02 DIAGNOSIS — M54.41 CHRONIC BILATERAL LOW BACK PAIN WITH BILATERAL SCIATICA: ICD-10-CM

## 2021-11-02 DIAGNOSIS — Z80.3 FHX: BREAST CANCER: ICD-10-CM

## 2021-11-02 DIAGNOSIS — M54.42 CHRONIC BILATERAL LOW BACK PAIN WITH BILATERAL SCIATICA: ICD-10-CM

## 2021-11-02 PROCEDURE — 99214 OFFICE O/P EST MOD 30 MIN: CPT | Performed by: FAMILY MEDICINE

## 2021-11-02 RX ORDER — CLONIDINE 0.1 MG/24H
1 PATCH, EXTENDED RELEASE TRANSDERMAL
Qty: 12 PATCH | Refills: 0 | Status: SHIPPED | OUTPATIENT
Start: 2021-11-02 | End: 2022-01-20

## 2021-11-02 RX ORDER — PREGABALIN 50 MG/1
50 CAPSULE ORAL 2 TIMES DAILY
Qty: 60 CAPSULE | Refills: 2 | Status: SHIPPED | OUTPATIENT
Start: 2021-11-02 | End: 2021-11-04 | Stop reason: SDUPTHER

## 2021-11-02 RX ORDER — ASPIRIN 81 MG/1
81 TABLET, CHEWABLE ORAL DAILY
COMMUNITY
End: 2023-09-11

## 2021-11-02 ASSESSMENT — FIBROSIS 4 INDEX: FIB4 SCORE: 0.89

## 2021-11-02 NOTE — PROGRESS NOTES
Subjective:   CC: vasomotor symptoms     HPI:     Lizy Merlos is a 66 y.o. female, established patient of the clinic.     , not sexual active, went into menopause in her 50s. She continues to have worsening hot flashes that are increased in frequency and extremely bothersome. She is taking Lexapro 20 mg qd for VANI. She is also taking Lyrica 50 mg BID for chronic neck and back pain. Her mother was diagnosed with breast cancer and has passed away a number of years ago. She does not know more information regarding her mother. She states that her sister takes HRT and appears to do fine. She wants to discuss the possibility of trying HRT for her symptoms.     She has chronic lower back pain. She is working with [employer]. She had tried epidural injection without long lasting effects. She also tried to work with physical therapy. She is considering ablation. She requests refills of Lyrica. She tolerates Lyrica well, no side effects reported. Negative history of drugs, alcohol, tobacco abuse.     Current medicines (including changes today)  Current Outpatient Medications   Medication Sig Dispense Refill   • aspirin (ASA) 81 MG Chew Tab chewable tablet Chew 81 mg every day.     • pregabalin (LYRICA) 50 MG capsule Take 1 Capsule by mouth 2 times a day for 90 days. 60 Capsule 2   • cloNIDine (CATAPRES) 0.1 MG/24HR PATCH WEEKLY patch Place 1 Patch on the skin every 7 days. 12 Patch 0   • busPIRone (BUSPAR) 15 MG tablet Take 1 Tablet by mouth 2 times a day. 180 Tablet 1   • escitalopram (LEXAPRO) 20 MG tablet Take 1 Tablet by mouth every day. 90 Tablet 1   • simvastatin (ZOCOR) 40 MG Tab TAKE ONE TABLET BY MOUTH EVERY EVENING 90 tablet 3   • Cyanocobalamin (B-12 PO) Take  by mouth.       No current facility-administered medications for this visit.     She  has a past medical history of Alcohol abuse, Anxiety disorder (2019), Arthritis (2019), Depression (2019), High cholesterol, Liver disease, Restless  "leg syndrome, Sleep apnea, Snoring, and Urinary incontinence.    I personally reviewed patient's problem list, allergies, medications, family hx, social hx with patient and update EPIC.     REVIEW OF SYSTEMS:  CONSTITUTIONAL:  Denies night sweats, fatigue, malaise, lethargy, fever or chills.  RESPIRATORY:  Denies cough, wheeze, hemoptysis, or shortness of breath.  CARDIOVASCULAR:  Denies chest pains, palpitations, pedal edema     Objective:     /66 (BP Location: Right arm, Patient Position: Sitting, BP Cuff Size: Adult)   Pulse 63   Temp 36.9 °C (98.5 °F) (Temporal)   Ht 1.6 m (5' 3\")   Wt 74.8 kg (165 lb)   SpO2 97%  Body mass index is 29.23 kg/m².    Physical Exam:  Constitutional: awake, alert, in no distress.  Skin: Warm, dry, good turgor, no rashes, bruises, ulcers in visible areas.  Eye: conjunctiva clear, lids neg for edema or lesions.  Respiratory: Unlabored respiratory effort, lungs clear to auscultation, no wheezes, no rales.  Cardiovascular: Normal S1, S2, no murmur, no pedal edema.  Psych: Oriented x3, affect and mood wnl, intact judgement and insight.       Assessment and Plan:   The following treatment plan was discussed    1. Chronic bilateral low back pain with bilateral sciatica  Chronic, working with Dr. Rincon, s/p epidural injection without relief, tried physical therapy but did not help .she is considering ablation. She is taking Lyrica 50 mg BID for pain which appears to help. Plans:   - pregabalin (LYRICA) 50 MG capsule; Take 1 Capsule by mouth 2 times a day for 90 days.  Dispense: 60 Capsule; Refill: 2  - Risks, benefits, side effects, as well as potential health complications associated with Lyrica was previously discussed with patient. Appropriate counseling provided.      2. FHx: breast cancer  3. Vasomotor symptoms due to menopause  Worsening hot flashes despite has been in menopause for 10+ years  She is taking Lexapro and Lyrica for mental health and chronic lower back pain, " yet symptoms are still persistent.   She has family history of breast cancer. HRT is not generally recommended.   I discussed treatment options as well as risks/benefits of each treatment. She would like to try Clonidine at this time.   - cloNIDine (CATAPRES) 0.1 MG/24HR PATCH WEEKLY patch; Place 1 Patch on the skin every 7 days.  Dispense: 12 Patch; Refill: 0  - follow up PRN if symptoms fail to improve.     4. Screening for osteoporosis  - DS-BONE DENSITY STUDY (DEXA); Future       Ly ZENAIDA Holguin M.D.      Followup: Return in about 3 months (around 2/2/2022) for Multiple issues.    Please note that this dictation was created using voice recognition software. I have made every reasonable attempt to correct obvious errors, but I expect that there are errors of grammar and possibly content that I did not discover before finalizing the note.

## 2021-11-03 PROBLEM — F33.42 RECURRENT MAJOR DEPRESSIVE DISORDER, IN FULL REMISSION (HCC): Status: RESOLVED | Noted: 2019-07-23 | Resolved: 2021-11-03

## 2021-11-03 PROBLEM — N95.1 VASOMOTOR SYMPTOMS DUE TO MENOPAUSE: Status: ACTIVE | Noted: 2021-11-03

## 2021-11-04 ENCOUNTER — PATIENT MESSAGE (OUTPATIENT)
Dept: MEDICAL GROUP | Age: 66
End: 2021-11-04

## 2021-11-04 DIAGNOSIS — M54.42 CHRONIC BILATERAL LOW BACK PAIN WITH BILATERAL SCIATICA: ICD-10-CM

## 2021-11-04 DIAGNOSIS — G89.29 CHRONIC BILATERAL LOW BACK PAIN WITH BILATERAL SCIATICA: ICD-10-CM

## 2021-11-04 DIAGNOSIS — M54.41 CHRONIC BILATERAL LOW BACK PAIN WITH BILATERAL SCIATICA: ICD-10-CM

## 2021-11-04 RX ORDER — PREGABALIN 50 MG/1
50 CAPSULE ORAL 2 TIMES DAILY
Qty: 20 CAPSULE | Refills: 0 | Status: SHIPPED | OUTPATIENT
Start: 2021-11-04 | End: 2021-11-14

## 2021-11-04 NOTE — TELEPHONE ENCOUNTER
From: Lizy Merlos  To: Physician Norma Holguin  Sent: 11/4/2021 6:30 AM PDT  Subject: refill pregabalin    [facility] will not be able to send refill until 11/12 may I get a refill at [facility] [phone] [facility] just until [facility] gets here, maybe 24 pills, that would really help as I have been in pain again.    Thank you, Lizy

## 2021-11-22 ENCOUNTER — HOSPITAL ENCOUNTER (OUTPATIENT)
Dept: RADIOLOGY | Facility: MEDICAL CENTER | Age: 66
End: 2021-11-22
Attending: FAMILY MEDICINE
Payer: COMMERCIAL

## 2021-11-22 DIAGNOSIS — Z13.820 SCREENING FOR OSTEOPOROSIS: ICD-10-CM

## 2021-11-22 PROCEDURE — 77080 DXA BONE DENSITY AXIAL: CPT

## 2022-01-18 DIAGNOSIS — N95.1 VASOMOTOR SYMPTOMS DUE TO MENOPAUSE: ICD-10-CM

## 2022-01-20 DIAGNOSIS — M54.12 CERVICAL RADICULOPATHY: ICD-10-CM

## 2022-01-20 RX ORDER — CLONIDINE 0.1 MG/24H
PATCH, EXTENDED RELEASE TRANSDERMAL
Qty: 12 PATCH | Refills: 3 | Status: SHIPPED | OUTPATIENT
Start: 2022-01-20 | End: 2022-06-11

## 2022-01-20 RX ORDER — MELOXICAM 15 MG/1
15 TABLET ORAL
Qty: 30 TABLET | Refills: 0 | Status: SHIPPED | OUTPATIENT
Start: 2022-01-20 | End: 2022-06-11

## 2022-05-06 ENCOUNTER — TELEMEDICINE (OUTPATIENT)
Dept: BEHAVIORAL HEALTH | Facility: CLINIC | Age: 67
End: 2022-05-06
Payer: COMMERCIAL

## 2022-05-06 DIAGNOSIS — F33.42 RECURRENT MAJOR DEPRESSIVE DISORDER, IN FULL REMISSION (HCC): ICD-10-CM

## 2022-05-06 DIAGNOSIS — F41.1 GAD (GENERALIZED ANXIETY DISORDER): ICD-10-CM

## 2022-05-06 PROCEDURE — 99214 OFFICE O/P EST MOD 30 MIN: CPT | Mod: GT | Performed by: PSYCHIATRY & NEUROLOGY

## 2022-05-06 RX ORDER — ESCITALOPRAM OXALATE 20 MG/1
20 TABLET ORAL DAILY
Qty: 90 TABLET | Refills: 1 | Status: SHIPPED | OUTPATIENT
Start: 2022-05-06 | End: 2022-07-21 | Stop reason: SDUPTHER

## 2022-05-06 RX ORDER — BUSPIRONE HYDROCHLORIDE 5 MG/1
TABLET ORAL
Qty: 252 TABLET | Refills: 0 | Status: SHIPPED | OUTPATIENT
Start: 2022-05-06 | End: 2022-07-21

## 2022-05-06 ASSESSMENT — PATIENT HEALTH QUESTIONNAIRE - PHQ9: CLINICAL INTERPRETATION OF PHQ2 SCORE: 0
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
## 2022-05-26 DIAGNOSIS — E78.00 HYPERCHOLESTEROLEMIA: ICD-10-CM

## 2022-05-27 RX ORDER — PREGABALIN 50 MG/1
CAPSULE ORAL
COMMUNITY
End: 2022-06-11

## 2022-05-27 RX ORDER — SIMVASTATIN 40 MG
TABLET ORAL
Qty: 90 TABLET | Refills: 3 | Status: SHIPPED | OUTPATIENT
Start: 2022-05-27 | End: 2023-05-26

## 2022-06-01 ENCOUNTER — APPOINTMENT (OUTPATIENT)
Dept: SLEEP MEDICINE | Facility: MEDICAL CENTER | Age: 67
End: 2022-06-01
Payer: COMMERCIAL

## 2022-06-02 ENCOUNTER — OFFICE VISIT (OUTPATIENT)
Dept: SLEEP MEDICINE | Facility: MEDICAL CENTER | Age: 67
End: 2022-06-02
Payer: COMMERCIAL

## 2022-06-02 VITALS
WEIGHT: 170 LBS | HEIGHT: 63 IN | BODY MASS INDEX: 30.12 KG/M2 | HEART RATE: 72 BPM | DIASTOLIC BLOOD PRESSURE: 78 MMHG | SYSTOLIC BLOOD PRESSURE: 126 MMHG | OXYGEN SATURATION: 95 %

## 2022-06-02 DIAGNOSIS — G47.33 OSA (OBSTRUCTIVE SLEEP APNEA): ICD-10-CM

## 2022-06-02 PROCEDURE — 99212 OFFICE O/P EST SF 10 MIN: CPT | Performed by: NURSE PRACTITIONER

## 2022-06-02 ASSESSMENT — FIBROSIS 4 INDEX: FIB4 SCORE: 0.91

## 2022-06-02 NOTE — PROGRESS NOTES
"Chief Complaint   Patient presents with   • Apnea     PRAVEEN on CPAP-Last seen 06/01/2021       HPI:      Mrs. Merlos is a 66 y/o female patient who is in today for annual PRAVEEN f/u. PMH includes vocal cord dysfunction, anxiety, depression, hypercholesterolemia, chronic back pain, PRAVEEN, former smoker, tonsillectomy, RLS, alcohol abuse.    Patient was set up with a [employer] CPAP machine on 2/25/2020.  Patient has registered the device.  Compliance report from 5/2/22-5/31/22 was downloaded and reviewed with the patient which showed CPAP 16 cmH2O, 100% compliance, 9 hrs 52 min use, AHI of 6.8.  The patient reports that her machine \"makes all kinds of noise\" that started \"months ago\".  Whenever she puts the mask on the breathing increases and also the connection makes a \"pop pop\" noise.  She states that these noises prevent her from using the machine however her compliance over the last 30 days is at 100% with almost 10 hours of usage.  Patient states, with a raised and agitated voice, she does not have the means to purchase a machine out-of-pocket and states per [employer] website the recall poses \"death risk\" but she also does not have the option of not using the machine.  She states when she completed a sleep study \"they had to ration to the room and slapped a mask on her because she was shelter dead\".  She inquires about how she can be expected to not use the CPAP.  Patient did not want to accept that her insurance at this time would not cover a new machine.  Patient was informed that a new order will be placed however likely will be denied.  Patient is also advised to continue to wait for replacement machine from [employer].      Sleep Study History:   PSG split night study from 12/17/19 indicated severe obstructive sleep apnea hypopnea - AHI 55.1. Significant nocturnal desaturation - francia saturation 78 % - saturations <90% for 17.3% of the diagnostic recording. Successful CPAP titration " to a best pressure of 16 cm H2O with resultant AHI of 0.46, a francia saturation of 94%, mean saturation of 97%, and the achievement of REM but not supine sleep.    ROS: deferred       Past Medical History:   Diagnosis Date   • Alcohol abuse    • Anxiety disorder 09/2019   • Arthritis 09/2019    hips, spine, hands   • Depression 09/2019   • High cholesterol    • Liver disease    • Restless leg syndrome    • Sleep apnea     cpap   • Snoring    • Urinary incontinence        Past Surgical History:   Procedure Laterality Date   • PB RUDOLPH W/O FACETEC FORAMOT/[facility]KC 1/2 VRT SEG, CE*  12/18/2020    Procedure: LAMINECTOMY, SPINE, CERVICAL, POSTERIOR APPROACH-STAGE#3 C6-T1 LAMI AND C5-7 FACETECTOMY;  Surgeon: Lavell Rincon M.D.;  Location: [facility];  Service: Neurosurgery   • CERVICAL DISK AND FUSION ANTERIOR  12/18/2020    Procedure: DISCECTOMY, SPINE, CERVICAL, ANTERIOR APPROACH, WITH FUSION-STAGE #1 C3-5 DISCECTOMY WITH RECURRENT LARYNGEAL NERVE MONITORING SSEPS;  Surgeon: Lavell Rincon M.D.;  Location: [facility];  Service: Neurosurgery   • CORPECTOMY  12/18/2020    Procedure: CORPECTOMY, SPINE-ANTERIOR C4;  Surgeon: Lavell Rincon M.D.;  Location: [facility];  Service: Neurosurgery   • POSTERIOR CERVICAL FUSION O-ARM  12/18/2020    Procedure: FUSION, SPINE, CERVICAL, POSTERIOR APPROACH, WITH O-ARM IMAGING GUIDANCE-STAGE #2 C3-C6, C7-T3 LATERAL FUSION;  Surgeon: Lavell Rincon M.D.;  Location: [facility];  Service: Neurosurgery   • NECK EXPLORATION Right 12/18/2020    Procedure: EXPLORATION, NECK AND CERVICAL SPINE EXPOSURE;  Surgeon: Lavell Rincon M.D.;  Location: [facility];  Service: Neurosurgery   • PB SHLDR ARTHROSCOP,SURG,W/ROTAT CUFF REPB Left 10/1/2019    Procedure: ARTHROSCOPY, SHOULDER, WITH ROTATOR CUFF REPAIR;  Surgeon: Lavell Hooks M.D.;  Location: [facility];  Service: Orthopedics   • RI SHLDR ARTHROSCOP,PART ACROMIOPLAS Left  10/1/2019    Procedure: DECOMPRESSION, SHOULDER, ARTHROSCOPIC - SUBACROMIAL W/LABRAL DEBRIDEMENT;  Surgeon: Lavell Hooks M.D.;  Location: SURGERY [facility];  Service: Orthopedics   • PB ARTHROSCOPY SHOULDER SURGICAL BICEPS TENODES* Left 10/1/2019    Procedure: ARTHROSCOPY, SHOULDER, WITH BICEPS TENODESIS - W/OPEN SUBPECTORAL TENODESIS, PROCEED AS INDICATED;  Surgeon: Lavell Hooks M.D.;  Location: SURGERY [facility];  Service: Orthopedics   • CERVICAL DISK AND FUSION ANTERIOR   and    • PRIMARY C SECTION      twice    • SHOULDER SURGERY Left     Rotator cuff   • SINUSCOPE     • TONSILLECTOMY         Family History   Problem Relation Age of Onset   • Dementia Mother    • Heart Disease Mother         pacemaker   • Heart Disease Father         congestive heart failure   • Alcohol/Drug Paternal Uncle    • Dementia Maternal Grandmother    • Depression Maternal Grandmother    • Alcohol/Drug Maternal Grandfather    • Stroke Paternal Grandmother    • Heart Attack Paternal Grandfather        Social History     Socioeconomic History   • Marital status:      Spouse name: Not on file   • Number of children: 2   • Years of education: Not on file   • Highest education level: Not on file   Occupational History   • Occupation: [employer] tech   Tobacco Use   • Smoking status: Former Smoker     Packs/day: 1.00     Years: 20.00     Pack years: 20.00     Types: Cigarettes     Quit date:      Years since quittin.4   • Smokeless tobacco: Never Used   Vaping Use   • Vaping Use: Never used   Substance and Sexual Activity   • Alcohol use: Not Currently     Comment: former alcoholic, stopped 2016   • Drug use: Not Currently     Types: Marijuana, Cocaine   • Sexual activity: Yes     Partners: Male     Birth control/protection: Post-Menopausal     Comment: spouse   Other Topics Concern   • Not on file   Social History Narrative   • Not on file     Social Determinants of Health      Financial Resource Strain: Not on file   Food Insecurity: Not on file   Transportation Needs: Not on file   Physical Activity: Not on file   Stress: Not on file   Social Connections: Not on file   Intimate Partner Violence: Not on file   Housing Stability: Not on file       Allergies as of 06/02/2022   • (No Known Allergies)        Vitals:  Vitals:    06/02/22 0812   BP: 126/78   Pulse: 72   SpO2: 95%       Current medications as of today   Current Outpatient Medications   Medication Sig Dispense Refill   • simvastatin (ZOCOR) 40 MG Tab TAKE 1 TABLET EVERY EVENING 90 Tablet 3   • escitalopram (LEXAPRO) 20 MG tablet Take 1 Tablet by mouth every day. 90 Tablet 1   • busPIRone (BUSPAR) 5 MG tablet Take 2 Tablets by mouth 2 times a day for 42 days, THEN 1 Tablet 2 times a day for 42 days. 252 Tablet 0   • meloxicam (MOBIC) 15 MG tablet Take 1 Tablet by mouth 1 time a day as needed. With foods 30 Tablet 0   • aspirin (ASA) 81 MG Chew Tab chewable tablet Chew 81 mg every day.     • Cyanocobalamin (B-12 PO) Take  by mouth.     • pregabalin (LYRICA) 50 MG capsule pregabalin 50 mg capsule     • cloNIDine (CATAPRES) 0.1 MG/24HR PATCH WEEKLY patch APPLY 1 PATCH ON THE SKIN EVERY 7 DAYS 12 Patch 3     No current facility-administered medications for this visit.         Physical Exam: Limited by COVID-19 precautions.  Appearance: Well developed, well nourished, no acute distress  Eyes: PERRL, EOM intact, sclera white, conjunctiva moist  Ears: no lesions or deformities  Hearing: grossly intact  Nose: no lesions or deformities  Respiratory effort: no intercostal retractions or use of accessory muscles  Extremities: no cyanosis or edema  Abdomen: soft   Gait and Station: normal  Digits and nails: no clubbing, cyanosis, petechiae or nodes.  Cranial nerves: grossly intact  Skin: no visible rashes, lesions or ulcers noted  Orientation: Oriented to time, person and place  Mood and affect: patient is agitated and argumentative with  examiner and conversation was difficult  Judgement: Intact    Assessment:  1. PRAVEEN (obstructive sleep apnea)  DME CPAP    Referral to Pulmonary and Sleep Medicine   2. BMI 30.0-30.9,adult  CANCELED: Patient identified as having weight management issue.  Appropriate orders and counseling given.         Plan  Discussed the cardiovascular and neuropsychiatric risks of untreated PRAVEEN; including but not limited to: HTN, DM, MI, ASCVD, CVA, CHF, traffic accidents.     1. Compliance report from 5/2/22-5/31/22 was downloaded and reviewed with the patient which showed CPAP 16 cmH2O, 100% compliance, 9 hrs 52 min use, AHI of 6.8.  Patient is compliant and benefiting from CPAP therapy for management of PRAVEEN.     *DME order ([facility]) for new CPAP 16 cmH2O, mask (MOC) and supplies was placed today and patient was also provided with a copy. Continue to clean mask and supplies weekly with soap and water, and change supplies per insurance guidelines.  Patient was advised that she is at this time not eligible for a new machine through insurance and that her options include to continue to use her current machine which she is at 100% compliance, wait for replacement through [employer] or also purchase a machine out-of-pocket.  The patient became agitated, argumentative and unwilling to pursue other options.  A new order for machine is however today placed but again likely will be denied by insurance and DME company.  We spoke with [facility] who also confirmed that the patient is not yet eligible for a new machine.    **Recall of [employer] CPAP machines was attempted to be reviewed with the patient today.  Information is provided below for patient to review.  Patient registered the device.  The recent recall from [employer] is due to disintegration of the polyurethane foam. This is a 0.03% risk of break down of an inner foam in the device, which is a small risk. Currently I recommend to continue using your  machine until or unless you experience black debris/particles within the air path or experiencing headaches, upper airway irritation, cough, chest pressure, sinus infection, irritation to skin/eyes/respiratory tract, inflammatory response, asthma, nausea/vomiting to stop using the PAP therapy immediately and contact the office. It is ultimately your decision on whether you choose to continue using the CPAP machine. Patient is also recommended to reach out to their DME to let them know that they are using [employer] machine, therefore when and if needed those machines can be replaced by their DME's. We will keep you posted as we get more information from the  for the DME. Also advise to not use So-Clean or any other type of Ozone .     [place] your device on the recall website at www.[employer].com/src-update     2. Encourage a healthy diet and exercise.   3. Patient is referred for PRAVEEN to outside [facility] facility per Management.       CHAZ Guardado.      This dictation was created using voice recognition software. The accuracy of the dictation is limited to the abilities of the software. I expect there may be some errors of grammar and possibly content.

## 2022-06-10 ENCOUNTER — HOSPITAL ENCOUNTER (EMERGENCY)
Facility: MEDICAL CENTER | Age: 67
End: 2022-06-11
Attending: EMERGENCY MEDICINE
Payer: COMMERCIAL

## 2022-06-10 DIAGNOSIS — F10.929 ALCOHOLIC INTOXICATION WITH COMPLICATION (HCC): ICD-10-CM

## 2022-06-10 DIAGNOSIS — R46.89 AGGRESSIVE BEHAVIOR: ICD-10-CM

## 2022-06-10 DIAGNOSIS — Z91.89 AT RISK FOR DOMESTIC VIOLENCE: ICD-10-CM

## 2022-06-10 LAB
ALBUMIN SERPL BCP-MCNC: 4.5 G/DL (ref 3.2–4.9)
ALBUMIN/GLOB SERPL: 1.5 G/DL
ALP SERPL-CCNC: 85 U/L (ref 30–99)
ALT SERPL-CCNC: 20 U/L (ref 2–50)
AMPHET UR QL SCN: NEGATIVE
ANION GAP SERPL CALC-SCNC: 16 MMOL/L (ref 7–16)
AST SERPL-CCNC: 19 U/L (ref 12–45)
BARBITURATES UR QL SCN: NEGATIVE
BASOPHILS # BLD AUTO: 0.8 % (ref 0–1.8)
BASOPHILS # BLD: 0.06 K/UL (ref 0–0.12)
BENZODIAZ UR QL SCN: NEGATIVE
BILIRUB SERPL-MCNC: 0.3 MG/DL (ref 0.1–1.5)
BUN SERPL-MCNC: 9 MG/DL (ref 8–22)
BZE UR QL SCN: NEGATIVE
CALCIUM SERPL-MCNC: 9.4 MG/DL (ref 8.4–10.2)
CANNABINOIDS UR QL SCN: NEGATIVE
CHLORIDE SERPL-SCNC: 103 MMOL/L (ref 96–112)
CO2 SERPL-SCNC: 19 MMOL/L (ref 20–33)
CREAT SERPL-MCNC: 0.7 MG/DL (ref 0.5–1.4)
EKG IMPRESSION: NORMAL
EOSINOPHIL # BLD AUTO: 0.05 K/UL (ref 0–0.51)
EOSINOPHIL NFR BLD: 0.7 % (ref 0–6.9)
ERYTHROCYTE [DISTWIDTH] IN BLOOD BY AUTOMATED COUNT: 42.5 FL (ref 35.9–50)
ETHANOL BLD-MCNC: 177.3 MG/DL
GFR SERPLBLD CREATININE-BSD FMLA CKD-EPI: 95 ML/MIN/1.73 M 2
GLOBULIN SER CALC-MCNC: 3.1 G/DL (ref 1.9–3.5)
GLUCOSE SERPL-MCNC: 147 MG/DL (ref 65–99)
HCT VFR BLD AUTO: 43.7 % (ref 37–47)
HGB BLD-MCNC: 14.9 G/DL (ref 12–16)
IMM GRANULOCYTES # BLD AUTO: 0.04 K/UL (ref 0–0.11)
IMM GRANULOCYTES NFR BLD AUTO: 0.5 % (ref 0–0.9)
LYMPHOCYTES # BLD AUTO: 1.73 K/UL (ref 1–4.8)
LYMPHOCYTES NFR BLD: 22.9 % (ref 22–41)
MCH RBC QN AUTO: 30.8 PG (ref 27–33)
MCHC RBC AUTO-ENTMCNC: 34.1 G/DL (ref 33.6–35)
MCV RBC AUTO: 90.5 FL (ref 81.4–97.8)
METHADONE UR QL SCN: NEGATIVE
MONOCYTES # BLD AUTO: 0.38 K/UL (ref 0–0.85)
MONOCYTES NFR BLD AUTO: 5 % (ref 0–13.4)
NEUTROPHILS # BLD AUTO: 5.29 K/UL (ref 2–7.15)
NEUTROPHILS NFR BLD: 70.1 % (ref 44–72)
NRBC # BLD AUTO: 0 K/UL
NRBC BLD-RTO: 0 /100 WBC
OPIATES UR QL SCN: NEGATIVE
OXYCODONE UR QL SCN: POSITIVE
PCP UR QL SCN: NEGATIVE
PLATELET # BLD AUTO: 393 K/UL (ref 164–446)
PMV BLD AUTO: 9 FL (ref 9–12.9)
POC BREATHALIZER: 0.12 PERCENT (ref 0–0.01)
POTASSIUM SERPL-SCNC: 3.6 MMOL/L (ref 3.6–5.5)
PROPOXYPH UR QL SCN: NEGATIVE
PROT SERPL-MCNC: 7.6 G/DL (ref 6–8.2)
RBC # BLD AUTO: 4.83 M/UL (ref 4.2–5.4)
SODIUM SERPL-SCNC: 138 MMOL/L (ref 135–145)
WBC # BLD AUTO: 7.6 K/UL (ref 4.8–10.8)

## 2022-06-10 PROCEDURE — 80053 COMPREHEN METABOLIC PANEL: CPT

## 2022-06-10 PROCEDURE — 94760 N-INVAS EAR/PLS OXIMETRY 1: CPT

## 2022-06-10 PROCEDURE — 80179 DRUG ASSAY SALICYLATE: CPT

## 2022-06-10 PROCEDURE — 302970 POC BREATHALIZER: Performed by: EMERGENCY MEDICINE

## 2022-06-10 PROCEDURE — 82077 ASSAY SPEC XCP UR&BREATH IA: CPT

## 2022-06-10 PROCEDURE — 85025 COMPLETE CBC W/AUTO DIFF WBC: CPT

## 2022-06-10 PROCEDURE — 93005 ELECTROCARDIOGRAM TRACING: CPT | Performed by: EMERGENCY MEDICINE

## 2022-06-10 PROCEDURE — 99285 EMERGENCY DEPT VISIT HI MDM: CPT

## 2022-06-10 PROCEDURE — 80307 DRUG TEST PRSMV CHEM ANLYZR: CPT

## 2022-06-10 PROCEDURE — 36415 COLL VENOUS BLD VENIPUNCTURE: CPT

## 2022-06-10 PROCEDURE — 80143 DRUG ASSAY ACETAMINOPHEN: CPT

## 2022-06-11 VITALS
TEMPERATURE: 98.1 F | HEIGHT: 65 IN | OXYGEN SATURATION: 93 % | DIASTOLIC BLOOD PRESSURE: 74 MMHG | HEART RATE: 81 BPM | RESPIRATION RATE: 16 BRPM | SYSTOLIC BLOOD PRESSURE: 130 MMHG | BODY MASS INDEX: 28.29 KG/M2

## 2022-06-11 LAB
APAP SERPL-MCNC: <5 UG/ML (ref 10–30)
POC BREATHALIZER: 0.03 PERCENT (ref 0–0.01)
SALICYLATES SERPL-MCNC: <1 MG/DL (ref 15–25)

## 2022-06-11 PROCEDURE — 302970 POC BREATHALIZER: Performed by: EMERGENCY MEDICINE

## 2022-06-11 NOTE — ED NOTES
Pt up in draper, ambulated to restroom, gait steady, pt upset about wait, yelling at staff. Concerned about her dogs and denies SI and states she would like to go home.

## 2022-06-11 NOTE — ED NOTES
Pt pacing around the room in gown. She continues to be verbally aggressive with staff. Pt denies any needs.   1:1 sitter in sight of patient.
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
## 2022-06-11 NOTE — ED NOTES
Pt placed in room 11 and the garage door closed. Removed the patient belongings and placed her in a gown. Pt is angry but not aggressive with staff.   1:1 sitter in line of sight of patient.

## 2022-06-11 NOTE — ED NOTES
Pt sleeping in gurney with no signs of distress. Equal chests rise and fall noted. 1:1 sitter in sight of the patient.
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
## 2022-06-11 NOTE — ED NOTES
Pt awake and pacing around the room. She continues to be angry and wants to know why she can't go home. Denies any needs.
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
## 2022-06-11 NOTE — ED NOTES
Pharmacy Medication Reconciliation      ~Medication reconciliation updated and complete per patient home pharmacy  ~Allergies have been verified with patient home pharmacy  ~No oral ABX within the last 30 days  ~Patient home pharmacy:[facility]

## 2022-06-11 NOTE — ED NOTES
Pt taken to the restroom, urine sample collected, changed into gown and POC breathalyzer analyzed. Pt given a warm blanket + now resting comfortably.

## 2022-06-11 NOTE — ED NOTES
Pt states she denies SI, pt states she feels safe going home  Pt has resource sheets, discharge instructions and cab voucher    Pt discharged, reviewed all discharge instructions including follow up, pt verbalizes understanding, and denies questions.   Escorted to lobby. No belongings left in room.

## 2022-06-11 NOTE — ED NOTES
Pt is trying to leave room and requesting to go home. Redirected the patient back to her room and tried to explain to her why she has to stay in the ED. She continues to be angry but follows commands.

## 2022-06-11 NOTE — ED NOTES
PT awakens and is requesting to go home. Breathalyzer repeated with a result of 0.034. Alert team has been notified for evaluation. Pt denies any other needs.

## 2022-06-17 ENCOUNTER — TELEPHONE (OUTPATIENT)
Dept: BEHAVIORAL HEALTH | Facility: CLINIC | Age: 67
End: 2022-06-17
Payer: COMMERCIAL

## 2022-06-17 NOTE — TELEPHONE ENCOUNTER
----- Message from Sparkle Schaefer sent at 6/17/2022  8:47 AM PDT -----  Regarding: FW: FYI MESSAGE FROM [facility] / ALERT TEAM  Hello,   Please schedule this pt accordingly.   ----- Message -----  From: Darian Pathak M.D.  Sent: 6/15/2022   2:43 PM PDT  To: Alethea Peralta, Sparkle Schaefer, #  Subject: RE: FYI MESSAGE FROM [facility] / ALERT TEAM          The earliest I can see this patient is on July 1 at 10:30 am.   Please check with patient to confirm this appointment.       ----- Message -----  From: Alethea Peralta  Sent: 6/15/2022   2:14 PM PDT  To: Sparkle Schaefer, Emile Andrew, M.F.T., #  Subject: FYI MESSAGE FROM [facility] / ALERT TEAM              [facility] Hendrickson R.N.  Darian Pathak M.D.; Alethea Peralta    I just wanted to update the [facility]  team working with this patient as she had a [facility] ED visit 6/10/22 d/t suicidal ideation with alcohol intoxication. Pt was evaluated and discharged to [facility]. Please refer to visit notes and ED consult to  note for details.       Thanks,   [facility] Hendrickson RN

## 2022-06-17 NOTE — TELEPHONE ENCOUNTER
Phone Number Called: [phone]    Call outcome: Left detailed message for patient. Informed to call back with any additional questions.    Message: Left voicemail for patient to call back at [phone] so we can schedule her an appointment 7/1/2022.

## 2022-06-21 ENCOUNTER — OFFICE VISIT (OUTPATIENT)
Dept: MEDICAL GROUP | Age: 67
End: 2022-06-21
Payer: COMMERCIAL

## 2022-06-21 ENCOUNTER — TELEPHONE (OUTPATIENT)
Dept: BEHAVIORAL HEALTH | Facility: CLINIC | Age: 67
End: 2022-06-21
Payer: COMMERCIAL

## 2022-06-21 VITALS
DIASTOLIC BLOOD PRESSURE: 62 MMHG | OXYGEN SATURATION: 94 % | SYSTOLIC BLOOD PRESSURE: 120 MMHG | HEIGHT: 63 IN | HEART RATE: 73 BPM | BODY MASS INDEX: 29.59 KG/M2 | WEIGHT: 167 LBS | TEMPERATURE: 96.9 F

## 2022-06-21 DIAGNOSIS — K21.9 GASTROESOPHAGEAL REFLUX DISEASE WITHOUT ESOPHAGITIS: ICD-10-CM

## 2022-06-21 DIAGNOSIS — Z00.00 PE (PHYSICAL EXAM), ANNUAL: ICD-10-CM

## 2022-06-21 DIAGNOSIS — F41.1 GAD (GENERALIZED ANXIETY DISORDER): ICD-10-CM

## 2022-06-21 DIAGNOSIS — M54.42 CHRONIC BILATERAL LOW BACK PAIN WITH BILATERAL SCIATICA: ICD-10-CM

## 2022-06-21 DIAGNOSIS — Z80.3 FHX: BREAST CANCER: ICD-10-CM

## 2022-06-21 DIAGNOSIS — G47.33 OSA ON CPAP: Chronic | ICD-10-CM

## 2022-06-21 DIAGNOSIS — R73.03 PREDIABETES: ICD-10-CM

## 2022-06-21 DIAGNOSIS — M18.0 OSTEOARTHRITIS OF CARPOMETACARPAL (CMC) JOINT OF BOTH THUMBS: ICD-10-CM

## 2022-06-21 DIAGNOSIS — G89.29 CHRONIC BILATERAL LOW BACK PAIN WITH BILATERAL SCIATICA: ICD-10-CM

## 2022-06-21 DIAGNOSIS — M54.12 CERVICAL RADICULOPATHY: ICD-10-CM

## 2022-06-21 DIAGNOSIS — E78.00 HYPERCHOLESTEROLEMIA: ICD-10-CM

## 2022-06-21 DIAGNOSIS — N95.1 VASOMOTOR SYMPTOMS DUE TO MENOPAUSE: ICD-10-CM

## 2022-06-21 DIAGNOSIS — M54.41 CHRONIC BILATERAL LOW BACK PAIN WITH BILATERAL SCIATICA: ICD-10-CM

## 2022-06-21 DIAGNOSIS — Z12.31 ENCOUNTER FOR SCREENING MAMMOGRAM FOR BREAST CANCER: ICD-10-CM

## 2022-06-21 PROCEDURE — 99397 PER PM REEVAL EST PAT 65+ YR: CPT | Performed by: FAMILY MEDICINE

## 2022-06-21 RX ORDER — OMEPRAZOLE 20 MG/1
20 CAPSULE, DELAYED RELEASE ORAL
Qty: 90 CAPSULE | Refills: 0 | Status: SHIPPED | OUTPATIENT
Start: 2022-06-21 | End: 2023-07-27

## 2022-06-21 ASSESSMENT — FIBROSIS 4 INDEX: FIB4 SCORE: 0.72

## 2022-06-21 NOTE — TELEPHONE ENCOUNTER
Phone Number Called: [phone]     Call outcome: Left detailed message for patient. Informed to call back with any additional questions.       Message: Left voicemail for patient to call our office back at [phone] so we can schedule urgent appointment 7/1/2022 @ 10:30am per .

## 2022-06-21 NOTE — PROGRESS NOTES
"Subjective:   CC: Annual physical    HPI:     Lizy Merlos is a 67 y.o. female, established patient of the clinic.     Patient has chronic sleep apnea, prediabetes, hyperlipidemia, VANI.  She is taking all medications as directed.  She tolerates them well, no side effect reported.  She is due for annual labs.  She complains of mild continued weight gain despite healthy diet and no changes in physical activity.     Patient was taking clonidine for vasomotor symptoms.  Clonidine was very helpful.  However, patient has stopped taking clonidine due to significant improvement of symptoms.    She is working with psychiatry for management of VANI.  Her symptoms are in excellent control with BuSpar, Lexapro.    Current medicines (including changes today)  Current Outpatient Medications   Medication Sig Dispense Refill   • omeprazole (PRILOSEC) 20 MG delayed-release capsule Take 1 Capsule by mouth 1 time a day as needed (acid reflux). 90 Capsule 0   • simvastatin (ZOCOR) 40 MG Tab TAKE 1 TABLET EVERY EVENING 90 Tablet 3   • escitalopram (LEXAPRO) 20 MG tablet Take 1 Tablet by mouth every day. 90 Tablet 1   • busPIRone (BUSPAR) 5 MG tablet Take 2 Tablets by mouth 2 times a day for 42 days, THEN 1 Tablet 2 times a day for 42 days. 252 Tablet 0   • aspirin (ASA) 81 MG Chew Tab chewable tablet Chew 81 mg every day.       No current facility-administered medications for this visit.     She  has a past medical history of Alcohol abuse, Anxiety disorder (09/2019), Arthritis (09/2019), Depression (09/2019), High cholesterol, Liver disease, Restless leg syndrome, Sleep apnea, Snoring, and Urinary incontinence.    I reviewed patient's problem list, allergies, medications, family hx, social hx with patient and update EPIC.        Objective:     /62 (BP Location: Right arm, Patient Position: Sitting, BP Cuff Size: Adult)   Pulse 73   Temp 36.1 °C (96.9 °F) (Temporal)   Ht 1.6 m (5' 3\")   Wt 75.8 kg (167 lb)   SpO2 94%  " Body mass index is 29.58 kg/m².    Physical Exam:  Constitutional: awake, alert, in no distress.  Skin: Warm, dry, good turgor, no rashes, bruises, ulcers in visible areas.  Eye: conjunctiva clear, lids neg for edema or lesions.  ENMT: TM and auditory canals wnl.    Neck: Trachea midline, no masses, no thyromegaly. No cervical or supraclavicular lymphadenopathy  Respiratory: Unlabored respiratory effort, lungs clear to auscultation, no wheezes, no rales.  Cardiovascular: Normal S1, S2, no murmur, no pedal edema.  Abdomen: Soft, non-tender to palpation, active BS, no hernia, no hepatosplenomegaly, negative rebound or guarding.   Psych: Oriented x3, affect and mood wnl, intact judgement and insight.       Assessment and Plan:   The following treatment plan was discussed    1. Vasomotor symptoms due to menopause  Resolved.   Patient has stopped taking clonidine.    2. Gastroesophageal reflux disease without esophagitis  Patient is taking omeprazole 20 mg as needed for intermittent flare of GERD.  For the most part, her symptoms are controlled with diet.  - omeprazole (PRILOSEC) 20 MG delayed-release capsule; Take 1 Capsule by mouth 1 time a day as needed (acid reflux).  Dispense: 90 Capsule; Refill: 0    3. PRAVEEN on CPAP  -Continue CPAP and follow-up with sleep medicine as directed.  -Patient is interested in hypoglossal nerve stimulator.  However, she will contact her insurance to inquire about the cost    4. Prediabetes  She has history of prediabetes.  She is due to have repeat labs for reassessment.  She remains asymptomatic.  - Dietary/lifestyle modification and weight loss    - HEMOGLOBIN A1C; Future    5. Hypercholesterolemia  Chronic, controlled with Zocor 40 mg daily, however, she does need to have repeat labs for reassessment.  - CBC WITH DIFFERENTIAL; Future  - Comp Metabolic Panel; Future  - Lipid Profile; Future    7. Cervical radiculopathy_s/p decompression and fusion by Dr Rincon in 12/2020  8. Chronic  bilateral low back pain with bilateral sciatica  9. Osteoarthritis of carpometacarpal (CMC) joint of both thumbs_Dr. Jonel Houser, continues to follow-up with neurosurgery, hand surgeon as needed  - rehab exercises   - OTC analgesics PRN for pain   - weight loss  - icing, rest, activity modification     10. Encounter for screening mammogram for breast cancer  - MA-SCREENING MAMMO BILAT W/CAD; Future    11. FHx: breast cancer  Patient mom was diagnosed with breast cancer in her 60s.  - Referral to Genetic Research Studies    12. PE (physical exam), annual  General health and wellness counseling provided.      - CBC WITH DIFFERENTIAL; Future  - Comp Metabolic Panel; Future  - HEMOGLOBIN A1C; Future  - Lipid Profile; Future       Ly ZENAIDA Holguin M.D.      Followup: Return in about 1 year (around 6/21/2023) for annual PE.    Please note that this dictation was created using voice recognition software. I have made every reasonable attempt to correct obvious errors, but I expect that there are errors of grammar and possibly content that I did not discover before finalizing the note.

## 2022-06-23 ENCOUNTER — RESEARCH ENCOUNTER (OUTPATIENT)
Dept: MEDICAL GROUP | Facility: PHYSICIAN GROUP | Age: 67
End: 2022-06-23
Payer: COMMERCIAL

## 2022-06-23 DIAGNOSIS — Z00.6 RESEARCH STUDY PATIENT: ICD-10-CM

## 2022-07-21 ENCOUNTER — TELEMEDICINE (OUTPATIENT)
Dept: BEHAVIORAL HEALTH | Facility: CLINIC | Age: 67
End: 2022-07-21
Payer: COMMERCIAL

## 2022-07-21 DIAGNOSIS — F33.42 RECURRENT MAJOR DEPRESSIVE DISORDER, IN FULL REMISSION (HCC): ICD-10-CM

## 2022-07-21 DIAGNOSIS — F41.1 GAD (GENERALIZED ANXIETY DISORDER): ICD-10-CM

## 2022-07-21 PROCEDURE — 99214 OFFICE O/P EST MOD 30 MIN: CPT | Mod: 95 | Performed by: PSYCHIATRY & NEUROLOGY

## 2022-07-21 RX ORDER — ESCITALOPRAM OXALATE 20 MG/1
20 TABLET ORAL DAILY
Qty: 90 TABLET | Refills: 1 | Status: SHIPPED | OUTPATIENT
Start: 2022-07-21 | End: 2023-03-05 | Stop reason: SDUPTHER
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
## 2022-07-24 LAB
APOB+LDLR+PCSK9 GENE MUT ANL BLD/T: NOT DETECTED
BRCA1+BRCA2 DEL+DUP + FULL MUT ANL BLD/T: NOT DETECTED
MLH1+MSH2+MSH6+PMS2 GN DEL+DUP+FUL M: NOT DETECTED

## 2022-08-08 ENCOUNTER — PATIENT MESSAGE (OUTPATIENT)
Dept: MEDICAL GROUP | Age: 67
End: 2022-08-08
Payer: COMMERCIAL

## 2022-08-08 DIAGNOSIS — Z78.9 INTOLERANCE OF CONTINUOUS POSITIVE AIRWAY PRESSURE (CPAP) VENTILATION: ICD-10-CM

## 2022-08-08 DIAGNOSIS — G47.33 OSA (OBSTRUCTIVE SLEEP APNEA): ICD-10-CM

## 2022-08-09 NOTE — PROGRESS NOTES
Spoke to patient, referred to [facility] sleep medicine for consultation.   Norma Holguin M.D.

## 2022-08-22 ENCOUNTER — APPOINTMENT (OUTPATIENT)
Dept: BEHAVIORAL HEALTH | Facility: CLINIC | Age: 67
End: 2022-08-22
Payer: COMMERCIAL

## 2022-09-14 ENCOUNTER — PATIENT MESSAGE (OUTPATIENT)
Dept: MEDICAL GROUP | Age: 67
End: 2022-09-14
Payer: COMMERCIAL

## 2022-09-14 DIAGNOSIS — G47.33 OSA (OBSTRUCTIVE SLEEP APNEA): ICD-10-CM

## 2022-10-19 ENCOUNTER — PATIENT MESSAGE (OUTPATIENT)
Dept: MEDICAL GROUP | Age: 67
End: 2022-10-19
Payer: COMMERCIAL

## 2022-10-19 DIAGNOSIS — M54.12 CERVICAL RADICULOPATHY: ICD-10-CM

## 2022-10-20 RX ORDER — MELOXICAM 15 MG/1
15 TABLET ORAL
Qty: 30 TABLET | Refills: 0 | Status: SHIPPED | OUTPATIENT
Start: 2022-10-20 | End: 2023-03-05

## 2022-11-29 ENCOUNTER — OFFICE VISIT (OUTPATIENT)
Dept: MEDICAL GROUP | Age: 67
End: 2022-11-29
Payer: COMMERCIAL

## 2022-11-29 VITALS
HEIGHT: 63 IN | HEART RATE: 69 BPM | SYSTOLIC BLOOD PRESSURE: 120 MMHG | DIASTOLIC BLOOD PRESSURE: 62 MMHG | TEMPERATURE: 97 F | WEIGHT: 171 LBS | BODY MASS INDEX: 30.3 KG/M2 | OXYGEN SATURATION: 96 %

## 2022-11-29 DIAGNOSIS — M72.2 PLANTAR FIBROMATOSIS: ICD-10-CM

## 2022-11-29 DIAGNOSIS — Z23 NEED FOR VACCINATION: ICD-10-CM

## 2022-11-29 DIAGNOSIS — Z79.890 POSTMENOPAUSAL HRT (HORMONE REPLACEMENT THERAPY): ICD-10-CM

## 2022-11-29 DIAGNOSIS — J44.9 CHRONIC OBSTRUCTIVE PULMONARY DISEASE, UNSPECIFIED COPD TYPE (HCC): ICD-10-CM

## 2022-11-29 DIAGNOSIS — Z87.891 FORMER SMOKER: ICD-10-CM

## 2022-11-29 DIAGNOSIS — N95.1 VASOMOTOR SYMPTOMS DUE TO MENOPAUSE: ICD-10-CM

## 2022-11-29 DIAGNOSIS — Z87.891 HISTORY OF TOBACCO ABUSE: ICD-10-CM

## 2022-11-29 DIAGNOSIS — E66.9 OBESITY (BMI 30-39.9): ICD-10-CM

## 2022-11-29 PROCEDURE — 99214 OFFICE O/P EST MOD 30 MIN: CPT | Performed by: FAMILY MEDICINE

## 2022-11-29 RX ORDER — INHALER, ASSIST DEVICES
1 SPACER (EA) MISCELLANEOUS ONCE
Qty: 1 EACH | Refills: 0 | Status: SHIPPED | OUTPATIENT
Start: 2022-11-29 | End: 2022-11-29

## 2022-11-29 RX ORDER — ALBUTEROL SULFATE 90 UG/1
2 AEROSOL, METERED RESPIRATORY (INHALATION) EVERY 6 HOURS PRN
Qty: 8.5 G | Refills: 5 | Status: SHIPPED | OUTPATIENT
Start: 2022-11-29 | End: 2023-03-27

## 2022-11-29 RX ORDER — PROGESTERONE 100 MG/1
100 CAPSULE ORAL DAILY
Qty: 90 CAPSULE | Refills: 0 | Status: SHIPPED | OUTPATIENT
Start: 2022-11-29 | End: 2023-03-01 | Stop reason: SDUPTHER

## 2022-11-29 RX ORDER — TIZANIDINE HYDROCHLORIDE 4 MG/1
4 CAPSULE, GELATIN COATED ORAL
COMMUNITY
End: 2023-03-27

## 2022-11-29 RX ORDER — ESTRADIOL 0.5 MG/1
0.5 TABLET ORAL DAILY
Qty: 90 TABLET | Refills: 0 | Status: SHIPPED | OUTPATIENT
Start: 2022-11-29 | End: 2023-03-01 | Stop reason: SDUPTHER

## 2022-11-29 ASSESSMENT — FIBROSIS 4 INDEX: FIB4 SCORE: 0.72

## 2022-12-01 ENCOUNTER — TELEPHONE (OUTPATIENT)
Dept: HEMATOLOGY ONCOLOGY | Facility: MEDICAL CENTER | Age: 67
End: 2022-12-01
Payer: COMMERCIAL

## 2022-12-01 NOTE — TELEPHONE ENCOUNTER
Received referral to lung cancer screening program.  Chart review to assess for lung cancer screening program eligibility.   1. Age 50-80 yrs of age? Yes 67 y.o.  2. 20 pack year hx of smoking, or greater? Yes 1 [id number]= [id number] hx  3. Current smoker or if quit, has pt quit within last 15 yrs?Yes  Quit 2009  4. Any signs or symptoms of lung cancer? None noted  5. Previous history of lung cancer? None noted  6. Chest CT within past 12 mos.? None noted  Patient does meet eligibility criteria. [facility] scheduling notified to schedule the shared decision making visit.

## 2022-12-01 NOTE — PROGRESS NOTES
Subjective:   CC: vasomotor symptoms    HPI:     Lizy Merlos is a 67 y.o. female, established patient of the clinic.     Patient has chronic severe postmenopausal vasomotor symptoms.  Patient tried SSRI, gabapentin, clonidine in the past without success.  She continues to suffer persistent hot flashes, mood changes, weight gain, insomnia.  Patient is interested in trying hormone replacement therapy.  Her mother was diagnosed with breast cancer in her 60s.  Patient was previously tested negative for BRCA1/2.  Patient is aware of risks associated with hormone replacement therapy, but given severity of her symptoms, she wishes to try hormone replacement therapy.    Patient is a former smoker.  She used to smoke 1 pack/day for 25 years.  She quit in 2012.  She complains of chronic worsening shortness of breath with activity.  She also complains of chronic cough, mostly in the morning.  She is concerned for COPD.  She is interested in lung cancer screening.  Negative fever, chills, hemoptysis, unexplained weight loss.    Patient complains of a tender lump at the bottom of her left foot.  Negative history of trauma to the affected area.  The mass is bothersome with weightbearing activity.     Current medicines (including changes today)  Current Outpatient Medications   Medication Sig Dispense Refill    tizanidine (ZANAFLEX) 4 MG capsule Take 4 mg by mouth.      albuterol 108 (90 Base) MCG/ACT Aero Soln inhalation aerosol Inhale 2 Puffs every 6 hours as needed for Shortness of Breath. 8.5 g 5    estradiol (ESTRACE) 0.5 MG tablet Take 1 Tablet by mouth every day. 90 Tablet 0    progesterone (PROMETRIUM) 100 MG Cap Take 1 Capsule by mouth every day. 90 Capsule 0    diclofenac sodium (VOLTAREN) 1 % Gel Apply to 2-4 gram to affected area 4 times daily 100 g 1    meloxicam (MOBIC) 15 MG tablet Take 1 Tablet by mouth 1 time a day as needed for Inflammation or Mild Pain. With foods 30 Tablet 0    escitalopram (LEXAPRO)  "20 MG tablet Take 1 Tablet by mouth every day. 90 Tablet 1    omeprazole (PRILOSEC) 20 MG delayed-release capsule Take 1 Capsule by mouth 1 time a day as needed (acid reflux). 90 Capsule 0    simvastatin (ZOCOR) 40 MG Tab TAKE 1 TABLET EVERY EVENING 90 Tablet 3    aspirin (ASA) 81 MG Chew Tab chewable tablet Chew 81 mg every day.       No current facility-administered medications for this visit.     She  has a past medical history of Alcohol abuse, Anxiety disorder (09/2019), Arthritis (09/2019), Depression (09/2019), High cholesterol, Liver disease, Restless leg syndrome, Sleep apnea, Snoring, and Urinary incontinence.    I reviewed patient's problem list, allergies, medications, family hx, social hx with patient and update EPIC.        Objective:     /62 (BP Location: Right arm, Patient Position: Sitting, BP Cuff Size: Adult)   Pulse 69   Temp 36.1 °C (97 °F) (Temporal)   Ht 1.6 m (5' 3\")   Wt 77.6 kg (171 lb)   SpO2 96%  Body mass index is 30.29 kg/m².    Physical Exam:  Constitutional: awake, alert, in no distress.  Skin: Warm, dry, good turgor, no rashes, bruises, ulcers in visible areas.  Eye: conjunctiva clear, lids neg for edema or lesions.  Neck: Trachea midline, no masses, no thyromegaly. No cervical or supraclavicular lymphadenopathy  Respiratory: Unlabored respiratory effort, lungs clear to auscultation, no wheezes, no rales.  Cardiovascular: Normal S1, S2, no murmur, no pedal edema.  Psych: Oriented x3, affect and mood wnl, intact judgement and insight.   Left foot exam: 1 cm, mildly tender, mildly erythematous, firm, nonmobile mass noted at the arch of the left foot.    Assessment and Plan:   The following treatment plan was discussed    1. Obesity (BMI 30-39.9)  - Patient identified as having weight management issue.  Appropriate orders and counseling given.    2. Former smoker  3. Chronic obstructive pulmonary disease, unspecified COPD type (HCC)  4. History of tobacco abuse (Quit >6 mos " ago)  History and exam are concerning for COPD.   - PULMONARY FUNCTION TESTS -Test requested: Complete Pulmonary Function Test; Future  - Spacer/Aero-Holding Chambers (AEROCHAMBER MV) Misc; 1 Each one time for 1 dose.  Dispense: 1 Each; Refill: 0  - albuterol 108 (90 Base) MCG/ACT Aero Soln inhalation aerosol; Inhale 2 Puffs every 6 hours as needed for Shortness of Breath.  Dispense: 8.5 g; Refill: 5  - can consider pulmonary rehab.   - REFERRAL TO LUNG CANCER SCREENING PROGRAM; Future  - f/u in 3 months     5. Vasomotor symptoms due to menopause  6. Postmenopausal HRT (hormone replacement therapy)  Chronic, severe postmenopausal vasomotor symptoms, failed nonhormonal treatment.  Patient is interested in HRT. I discussed risks associated with HRT especially in the setting of family history of breast cancer. Patient is aware of these risks, but QoL is important to her.   - estradiol (ESTRACE) 0.5 MG tablet; Take 1 Tablet by mouth every day.  Dispense: 90 Tablet; Refill: 0  - progesterone (PROMETRIUM) 100 MG Cap; Take 1 Capsule by mouth every day.  Dispense: 90 Capsule; Refill: 0  - follow up in 3 months.     7. Plantar fibromatosis, left   History and exam are concerning for plantar fibromatosis of the left foot.   I discussed treatment options. She wishes to be referred to podiatrist.   - diclofenac sodium (VOLTAREN) 1 % Gel; Apply to 2-4 gram to affected area 4 times daily  Dispense: 100 g; Refill: 1  - Referral to Podiatry    8. Need for vaccination  - Tdap Vaccine =>6YO LIBBY Holguin M.D.      Followup: Return in about 3 months (around 2/28/2023) for HRT .    Please note that this dictation was created using voice recognition software. I have made every reasonable attempt to correct obvious errors, but I expect that there are errors of grammar and possibly content that I did not discover before finalizing the note.

## 2022-12-22 ENCOUNTER — APPOINTMENT (OUTPATIENT)
Dept: PULMONOLOGY | Facility: MEDICAL CENTER | Age: 67
End: 2022-12-22
Attending: FAMILY MEDICINE
Payer: COMMERCIAL

## 2022-12-29 ENCOUNTER — PATIENT MESSAGE (OUTPATIENT)
Dept: MEDICAL GROUP | Age: 67
End: 2022-12-29
Payer: COMMERCIAL

## 2022-12-29 ENCOUNTER — HOSPITAL ENCOUNTER (OUTPATIENT)
Dept: HEMATOLOGY ONCOLOGY | Facility: MEDICAL CENTER | Age: 67
End: 2022-12-29
Attending: NURSE PRACTITIONER
Payer: COMMERCIAL

## 2022-12-29 VITALS
SYSTOLIC BLOOD PRESSURE: 120 MMHG | HEART RATE: 78 BPM | HEIGHT: 63 IN | OXYGEN SATURATION: 96 % | DIASTOLIC BLOOD PRESSURE: 68 MMHG | RESPIRATION RATE: 14 BRPM | BODY MASS INDEX: 30.83 KG/M2 | TEMPERATURE: 98.5 F | WEIGHT: 174 LBS

## 2022-12-29 DIAGNOSIS — M25.561 CHRONIC PAIN OF RIGHT KNEE: ICD-10-CM

## 2022-12-29 DIAGNOSIS — Z87.891 PERSONAL HISTORY OF TOBACCO USE, PRESENTING HAZARDS TO HEALTH: ICD-10-CM

## 2022-12-29 DIAGNOSIS — G89.29 CHRONIC PAIN OF RIGHT KNEE: ICD-10-CM

## 2022-12-29 PROCEDURE — G0296 VISIT TO DETERM LDCT ELIG: HCPCS | Performed by: NURSE PRACTITIONER

## 2022-12-29 ASSESSMENT — ENCOUNTER SYMPTOMS
WHEEZING: 0
SHORTNESS OF BREATH: 1
COUGH: 1
WEIGHT LOSS: 0
SPUTUM PRODUCTION: 0
HEMOPTYSIS: 0

## 2022-12-29 ASSESSMENT — FIBROSIS 4 INDEX: FIB4 SCORE: 0.72

## 2022-12-29 NOTE — PROGRESS NOTES
"Subjective     Lizy Merlos is a 67 y.o. female who presents with Lung Cancer Screening Program Prescreen ([facility]/ [place]/ Nicotine Dependence/ Norma Holguin)            HPI  Patient seen today for initial lung cancer screening visit. Patient referred by PCP, Dr. Norma Holguin, for lung cancer screening shared decision making visit.    The patient meets eligibility criteria including age, smoking history (30+ pack years), if former smoker, quit in the last 15 years, and absence of signs or symptoms of lung cancer.    - Age - 67  - Smoking history - Patient has smoked for 40 years at an average of 1 ppd = 40 pack year smoking history.  - Current smoking status - former smoker, quit 2009  - No symptoms of lung cancer and no previous history of lung cancer       Review of Systems   Constitutional:  Positive for malaise/fatigue (\"more tired lately\"). Negative for weight loss.   Respiratory:  Positive for cough (dry) and shortness of breath (with activity). Negative for hemoptysis, sputum production and wheezing.         S/p COVID 9/2021, slow recovery     No Known Allergies      Current Outpatient Medications on File Prior to Encounter   Medication Sig Dispense Refill    tizanidine (ZANAFLEX) 4 MG capsule Take 4 mg by mouth.      albuterol 108 (90 Base) MCG/ACT Aero Soln inhalation aerosol Inhale 2 Puffs every 6 hours as needed for Shortness of Breath. 8.5 g 5    estradiol (ESTRACE) 0.5 MG tablet Take 1 Tablet by mouth every day. 90 Tablet 0    progesterone (PROMETRIUM) 100 MG Cap Take 1 Capsule by mouth every day. 90 Capsule 0    diclofenac sodium (VOLTAREN) 1 % Gel Apply to 2-4 gram to affected area 4 times daily 100 g 1    meloxicam (MOBIC) 15 MG tablet Take 1 Tablet by mouth 1 time a day as needed for Inflammation or Mild Pain. With foods 30 Tablet 0    escitalopram (LEXAPRO) 20 MG tablet Take 1 Tablet by mouth every day. 90 Tablet 1    omeprazole (PRILOSEC) 20 MG delayed-release capsule Take 1 Capsule by mouth 1 time a day " "as needed (acid reflux). 90 Capsule 0    simvastatin (ZOCOR) 40 MG Tab TAKE 1 TABLET EVERY EVENING 90 Tablet 3    aspirin (ASA) 81 MG Chew Tab chewable tablet Chew 81 mg every day.       No current facility-administered medications on file prior to encounter.              Objective     /68 (BP Location: Left arm, Patient Position: Sitting, BP Cuff Size: Adult)   Pulse 78   Temp 36.9 °C (98.5 °F) (Temporal)   Resp 14   Ht 1.6 m (5' 3\")   Wt 78.9 kg (174 lb)   LMP  (LMP Unknown)   SpO2 96%   BMI 30.82 kg/m²      Physical Exam  Vitals reviewed.   Constitutional:       General: She is not in acute distress.     Appearance: She is well-developed. She is not diaphoretic.   Cardiovascular:      Rate and Rhythm: Normal rate and regular rhythm.      Heart sounds: Normal heart sounds. No murmur heard.    No friction rub. No gallop.   Pulmonary:      Effort: Pulmonary effort is normal. No respiratory distress.      Breath sounds: Normal breath sounds. No wheezing.   Musculoskeletal:         General: Normal range of motion.   Skin:     General: Skin is warm and dry.   Neurological:      Mental Status: She is alert and oriented to person, place, and time.          Assessment & Plan     1. Personal history of tobacco use, presenting hazards to health  CT-LUNG CANCER-SCREENING          We conducted a shared decision-making process using a decision aid. We reviewed benefits and harms of screening, including false positives and potential need for additional diagnostic testing, the possibility of over diagnosis, and total radiation exposure.    We discussed the importance of adhering to annual LDCT screening. We also discussed the impact of comorbities on the patient's the ability or willingness to undergo diagnostic procedure(s) and treatment.    Counseling on the importance of maintaining cigarette smoking abstinence if former smoker; or the importance of smoking cessation if current smoker and, if appropriate, " furnishing of information about tobacco cessation interventions. I provided patient with smoking cessation materials and resources within [facility] and the community. Patient appreciative of the resources.    Based on our discussion, we have decided to begin annual lung cancer screening starting now.

## 2023-01-03 ENCOUNTER — PATIENT MESSAGE (OUTPATIENT)
Dept: MEDICAL GROUP | Age: 68
End: 2023-01-03
Payer: COMMERCIAL

## 2023-01-10 ENCOUNTER — HOSPITAL ENCOUNTER (OUTPATIENT)
Dept: PULMONOLOGY | Facility: MEDICAL CENTER | Age: 68
End: 2023-01-10
Attending: FAMILY MEDICINE
Payer: COMMERCIAL

## 2023-01-10 DIAGNOSIS — J44.9 CHRONIC OBSTRUCTIVE PULMONARY DISEASE, UNSPECIFIED COPD TYPE (HCC): ICD-10-CM

## 2023-01-10 PROCEDURE — 94726 PLETHYSMOGRAPHY LUNG VOLUMES: CPT

## 2023-01-10 PROCEDURE — 94729 DIFFUSING CAPACITY: CPT

## 2023-01-10 PROCEDURE — 94726 PLETHYSMOGRAPHY LUNG VOLUMES: CPT | Mod: 26 | Performed by: STUDENT IN AN ORGANIZED HEALTH CARE EDUCATION/TRAINING PROGRAM

## 2023-01-10 PROCEDURE — 94060 EVALUATION OF WHEEZING: CPT | Mod: 26 | Performed by: STUDENT IN AN ORGANIZED HEALTH CARE EDUCATION/TRAINING PROGRAM

## 2023-01-10 PROCEDURE — 94060 EVALUATION OF WHEEZING: CPT

## 2023-01-10 PROCEDURE — 94729 DIFFUSING CAPACITY: CPT | Mod: 26 | Performed by: STUDENT IN AN ORGANIZED HEALTH CARE EDUCATION/TRAINING PROGRAM

## 2023-01-10 RX ORDER — ALBUTEROL SULFATE 2.5 MG/3ML
2.5 SOLUTION RESPIRATORY (INHALATION)
Status: DISCONTINUED | OUTPATIENT
Start: 2023-01-10 | End: 2023-01-11 | Stop reason: HOSPADM

## 2023-01-10 RX ADMIN — ALBUTEROL SULFATE 2.5 MG: 2.5 SOLUTION RESPIRATORY (INHALATION) at 09:39

## 2023-01-10 ASSESSMENT — PULMONARY FUNCTION TESTS
FVC_LLN: 2.37
FEV1/FVC_PERCENT_CHANGE: 1
FEV1/FVC: 88.21
FEV1/FVC_PERCENT_PREDICTED: 109
FEV1_PERCENT_CHANGE: -1
FEV1_PERCENT_PREDICTED: 103
FEV1/FVC: 87
FEV1_LLN: 1.85
FVC: 2.67
FEV1: 2.31
FEV1/FVC_PERCENT_PREDICTED: 110
FEV1/FVC_PERCENT_PREDICTED: 113
FEV1/FVC_PERCENT_LLN: 66
FVC_PREDICTED: 2.84
FEV1_PERCENT_CHANGE: 0
FEV1_PREDICTED: 2.22
FEV1_LLN: 1.85
FVC_PERCENT_PREDICTED: 94
FVC_PERCENT_PREDICTED: 92
FVC_LLN: 2.37
FEV1/FVC_PREDICTED: 79
FEV1/FVC_PERCENT_CHANGE: 0
FEV1/FVC_PERCENT_PREDICTED: 78
FEV1/FVC: 86
FEV1: 2.32
FEV1/FVC_PERCENT_LLN: 66
FEV1/FVC_PERCENT_PREDICTED: 111
FEV1/FVC: 88
FVC: 2.63
FEV1_PERCENT_PREDICTED: 104

## 2023-01-11 NOTE — PROCEDURES
DATE OF SERVICE:  01/10/2023     PULMONARY FUNCTION TEST INTERPRETATION     INTERPRETATION:  There is no significant obstruction or restriction.  No   significant bronchodilator response.  Normal diffusion capacity.    The MVV is reduced disproportionately to the degree of reduction in FEV1.  The reduced MVV may reflect suboptimal effort, neuromuscular disease or upper airway obstruction.  Suggest maximal inspiratory pressures and maximal expiratory pressures for further evaluation.    There is some blunting of both the inspiratory and expiratory loop of the flow volume loop.  This can be seen with a fixed airway obstruction or potentially with variable intrathoracic or extrathoracic airway obstruction depending on the true degree of the blunting of the flow volume loop.  Recommend clinical correlation and can consider repeat PFTs for further evaluation.        ______________________________  MD CECI Hargrove/KALIE/LAYO    DD:  01/11/2023 11:41  DT:  01/11/2023 13:12    Job#:  [id number]

## 2023-01-13 ENCOUNTER — HOSPITAL ENCOUNTER (OUTPATIENT)
Dept: RADIOLOGY | Facility: MEDICAL CENTER | Age: 68
End: 2023-01-13
Attending: NURSE PRACTITIONER
Payer: COMMERCIAL

## 2023-01-13 DIAGNOSIS — Z87.891 PERSONAL HISTORY OF TOBACCO USE, PRESENTING HAZARDS TO HEALTH: ICD-10-CM

## 2023-01-13 PROCEDURE — 71271 CT THORAX LUNG CANCER SCR C-: CPT

## 2023-01-17 ENCOUNTER — TELEPHONE (OUTPATIENT)
Dept: HEMATOLOGY ONCOLOGY | Facility: MEDICAL CENTER | Age: 68
End: 2023-01-17
Payer: COMMERCIAL

## 2023-01-17 NOTE — TELEPHONE ENCOUNTER
Phoned patient with results of LDCT exam performed on 1/13/2023.    Notified her that the results showed 4 mm and 3 mm pulmonary nodules are identified posterior edge right lower pulmonary lobe that had a benign (or non cancer) appearance.    To make sure these nodule(s) are benign, and remain unchanged, we recommend a follow-up low-dose chest CT in 12 months, which will be ordered per PCP/referring provider.    Patient informed of incidental findings of calcific atherosclerosis with recommendation to follow-up with PCP/referring provider.  Patient agrees to all recommendations.    Referring provider Dr. Norma Holguin notified of results and incidental findings per this correspondence.    [facility] updated and patient sent lung cancer screening result letter.        CT-LUNG CANCER-SCREENING    Result Date: 1/13/2023 1/13/2023 7:04 AM HISTORY/REASON FOR EXAM:  40 pack-year smoking history. Shortness of breath.. TECHNIQUE/EXAM DESCRIPTION AND NUMBER OF VIEWS: Lung cancer screening without contrast. Low dose noncontrast helical images were obtained of the chest from the lung apices through the costophrenic sulci utilizing thin collimation and intervals with reconstructed images sent to PACS in axial, coronal and sagittal planes. Low dose optimization technique was utilized for this CT exam including automated exposure control and adjustment of the mA and/or kV according to patient size. COMPARISON: None. FINDINGS: 4 mm pulmonary nodule abuts the posterior pleural surface on image 91 right lower pulmonary lobe. Adjacent 3 mm nodule is identified on image 86. No other pulmonary nodules or masses are identified. No infiltrates or consolidations are identified. No pleural effusion or pleural thickening is identified. No pleural calcification is identified. No mediastinal or hilar adenopathy is identified. No soft tissue abnormalities are noted. There is calcific atherosclerosis.     1.  4 mm and 3 mm pulmonary nodules  are identified posterior edge right lower pulmonary lobe. Follow-up CT is recommended in 12 months. 2.  There is calcific atherosclerosis. Lung RADS: 2 - Benign Appearance or Behavior Nodules with a very low likelihood of becoming a clinically active cancer due to size or lack of growth Findings: solid nodule(s): less than 6 mm or new less than 4 mm part solid nodule(s): less than 6 mm total diameter on baseline screening non solid nodule(s) (GGN): less than 30 mm OR greater than or equal to 30 mm and unchanged or slowly growing category 3 or 4 nodules unchanged for greater than or equal to 3 months perifissural nodule(s) less than 10 mm Management: Continue annual screening with LDCT in 12 months

## 2023-01-17 NOTE — LETTER
. [address]  [place] [address]  P [phone]  F [phone]         Date: January 17, 2023    Lizy Nat Merlos  [address]  [address]  [place] [address]    Re:  Low-dose chest CT performed on 1/13/2023     Medical Record Number: [id number]    Dear Lizy,    We are writing to let you know that the results of your recent low-dose chest CT (LDCT) examination shows one or more lung nodule(s) which are likely benign (not cancer).  Lung nodules are very common and many people without cancer have these nodules.  To make sure these nodule(s) are benign, and remain unchanged, your radiologist recommends you have another low-dose chest CT on or around 1/13/2024. In the event that any additional “incidental” findings were identified from this exam, we have communicated back to your primary care provider for follow-up.    Here are some other important points you should know:  • Your low-dose Chest CT report has been sent to your referring or primary health care provider and is available to participants in [facility].  As a part of our Lung Cancer Screening program we will remind you and your referring health care provider when your next LDCT screening is due.  • Although low-dose chest CT is very effective at finding lung cancer early, it cannot find all lung cancers. If you develop any new symptoms such as shortness of breath, chest pain, or coughing up blood, please call your doctor.  • Please keep in mind that good health involves quitting smoking (for help, call [facility] Quit Tobacco program at [phone]), an annual physical exam, and continued screening with low-dose chest CT.    Thank you for participating in the Lung Cancer Screening program. If you have any questions about this letter or our program, please call our Nurse at [phone].    Sincerely,  Smith Oneill MD FACCP   Medical Director  [facility] Lung Cancer Screening Program

## 2023-01-19 PROBLEM — R91.8 LUNG NODULES: Status: ACTIVE | Noted: 2023-01-19

## 2023-02-07 ENCOUNTER — HOSPITAL ENCOUNTER (OUTPATIENT)
Dept: RADIOLOGY | Facility: MEDICAL CENTER | Age: 68
End: 2023-02-07
Attending: NURSE PRACTITIONER
Payer: COMMERCIAL

## 2023-02-07 DIAGNOSIS — M76.61 ACHILLES TENDINITIS, RIGHT LEG: ICD-10-CM

## 2023-02-07 DIAGNOSIS — M79.671 FOOT PAIN, RIGHT: ICD-10-CM

## 2023-02-07 PROCEDURE — 73721 MRI JNT OF LWR EXTRE W/O DYE: CPT | Mod: RT

## 2023-02-14 ENCOUNTER — HOSPITAL ENCOUNTER (OUTPATIENT)
Dept: LAB | Facility: MEDICAL CENTER | Age: 68
End: 2023-02-14
Attending: FAMILY MEDICINE
Payer: COMMERCIAL

## 2023-02-14 DIAGNOSIS — E78.00 HYPERCHOLESTEROLEMIA: ICD-10-CM

## 2023-02-14 DIAGNOSIS — R73.03 PREDIABETES: ICD-10-CM

## 2023-02-14 DIAGNOSIS — Z00.00 PE (PHYSICAL EXAM), ANNUAL: ICD-10-CM

## 2023-02-14 LAB
ALBUMIN SERPL BCP-MCNC: 4.4 G/DL (ref 3.2–4.9)
ALBUMIN/GLOB SERPL: 2 G/DL
ALP SERPL-CCNC: 72 U/L (ref 30–99)
ALT SERPL-CCNC: 19 U/L (ref 2–50)
ANION GAP SERPL CALC-SCNC: 10 MMOL/L (ref 7–16)
AST SERPL-CCNC: 13 U/L (ref 12–45)
BASOPHILS # BLD AUTO: 1.4 % (ref 0–1.8)
BASOPHILS # BLD: 0.1 K/UL (ref 0–0.12)
BILIRUB SERPL-MCNC: 0.5 MG/DL (ref 0.1–1.5)
BUN SERPL-MCNC: 14 MG/DL (ref 8–22)
CALCIUM ALBUM COR SERPL-MCNC: 8.9 MG/DL (ref 8.5–10.5)
CALCIUM SERPL-MCNC: 9.2 MG/DL (ref 8.5–10.5)
CHLORIDE SERPL-SCNC: 105 MMOL/L (ref 96–112)
CHOLEST SERPL-MCNC: 150 MG/DL (ref 100–199)
CO2 SERPL-SCNC: 25 MMOL/L (ref 20–33)
CREAT SERPL-MCNC: 0.71 MG/DL (ref 0.5–1.4)
EOSINOPHIL # BLD AUTO: 0.24 K/UL (ref 0–0.51)
EOSINOPHIL NFR BLD: 3.4 % (ref 0–6.9)
ERYTHROCYTE [DISTWIDTH] IN BLOOD BY AUTOMATED COUNT: 49.1 FL (ref 35.9–50)
FASTING STATUS PATIENT QL REPORTED: NORMAL
GFR SERPLBLD CREATININE-BSD FMLA CKD-EPI: 93 ML/MIN/1.73 M 2
GLOBULIN SER CALC-MCNC: 2.2 G/DL (ref 1.9–3.5)
GLUCOSE SERPL-MCNC: 98 MG/DL (ref 65–99)
HCT VFR BLD AUTO: 41.2 % (ref 37–47)
HDLC SERPL-MCNC: 62 MG/DL
HGB BLD-MCNC: 13.4 G/DL (ref 12–16)
IMM GRANULOCYTES # BLD AUTO: 0.05 K/UL (ref 0–0.11)
IMM GRANULOCYTES NFR BLD AUTO: 0.7 % (ref 0–0.9)
LDLC SERPL CALC-MCNC: 64 MG/DL
LYMPHOCYTES # BLD AUTO: 1.64 K/UL (ref 1–4.8)
LYMPHOCYTES NFR BLD: 23.3 % (ref 22–41)
MCH RBC QN AUTO: 31.3 PG (ref 27–33)
MCHC RBC AUTO-ENTMCNC: 32.5 G/DL (ref 33.6–35)
MCV RBC AUTO: 96.3 FL (ref 81.4–97.8)
MONOCYTES # BLD AUTO: 0.57 K/UL (ref 0–0.85)
MONOCYTES NFR BLD AUTO: 8.1 % (ref 0–13.4)
NEUTROPHILS # BLD AUTO: 4.44 K/UL (ref 2–7.15)
NEUTROPHILS NFR BLD: 63.1 % (ref 44–72)
NRBC # BLD AUTO: 0 K/UL
NRBC BLD-RTO: 0 /100 WBC
PLATELET # BLD AUTO: 373 K/UL (ref 164–446)
PMV BLD AUTO: 9.1 FL (ref 9–12.9)
POTASSIUM SERPL-SCNC: 4.4 MMOL/L (ref 3.6–5.5)
PROT SERPL-MCNC: 6.6 G/DL (ref 6–8.2)
RBC # BLD AUTO: 4.28 M/UL (ref 4.2–5.4)
SODIUM SERPL-SCNC: 140 MMOL/L (ref 135–145)
TRIGL SERPL-MCNC: 120 MG/DL (ref 0–149)
WBC # BLD AUTO: 7 K/UL (ref 4.8–10.8)

## 2023-02-14 PROCEDURE — 83036 HEMOGLOBIN GLYCOSYLATED A1C: CPT

## 2023-02-14 PROCEDURE — 80053 COMPREHEN METABOLIC PANEL: CPT

## 2023-02-14 PROCEDURE — 80061 LIPID PANEL: CPT

## 2023-02-14 PROCEDURE — 85025 COMPLETE CBC W/AUTO DIFF WBC: CPT

## 2023-02-14 PROCEDURE — 36415 COLL VENOUS BLD VENIPUNCTURE: CPT

## 2023-02-16 LAB
EST. AVERAGE GLUCOSE BLD GHB EST-MCNC: 114 MG/DL
HBA1C MFR BLD: 5.6 % (ref 4–5.6)

## 2023-02-28 ENCOUNTER — APPOINTMENT (OUTPATIENT)
Dept: MEDICAL GROUP | Age: 68
End: 2023-02-28
Payer: COMMERCIAL

## 2023-03-01 ENCOUNTER — PATIENT MESSAGE (OUTPATIENT)
Dept: MEDICAL GROUP | Age: 68
End: 2023-03-01
Payer: COMMERCIAL

## 2023-03-01 DIAGNOSIS — N95.1 VASOMOTOR SYMPTOMS DUE TO MENOPAUSE: ICD-10-CM

## 2023-03-01 DIAGNOSIS — Z79.890 POSTMENOPAUSAL HRT (HORMONE REPLACEMENT THERAPY): ICD-10-CM

## 2023-03-01 DIAGNOSIS — F33.42 RECURRENT MAJOR DEPRESSIVE DISORDER, IN FULL REMISSION (HCC): ICD-10-CM

## 2023-03-01 DIAGNOSIS — F41.1 GAD (GENERALIZED ANXIETY DISORDER): ICD-10-CM

## 2023-03-02 RX ORDER — ESTRADIOL 0.5 MG/1
0.5 TABLET ORAL DAILY
Qty: 90 TABLET | Refills: 0 | Status: SHIPPED | OUTPATIENT
Start: 2023-03-02 | End: 2023-04-03 | Stop reason: SDUPTHER

## 2023-03-02 RX ORDER — PROGESTERONE 100 MG/1
100 CAPSULE ORAL DAILY
Qty: 90 CAPSULE | Refills: 0 | Status: SHIPPED | OUTPATIENT
Start: 2023-03-02 | End: 2023-04-03 | Stop reason: SDUPTHER

## 2023-03-05 RX ORDER — ESCITALOPRAM OXALATE 20 MG/1
20 TABLET ORAL DAILY
Qty: 90 TABLET | Refills: 1 | Status: SHIPPED | OUTPATIENT
Start: 2023-03-05 | End: 2023-09-01

## 2023-03-22 PROBLEM — M76.61 ACHILLES TENDINITIS, RIGHT LEG: Status: ACTIVE | Noted: 2023-03-22

## 2023-04-03 ENCOUNTER — OFFICE VISIT (OUTPATIENT)
Dept: MEDICAL GROUP | Age: 68
End: 2023-04-03
Payer: COMMERCIAL

## 2023-04-03 VITALS
WEIGHT: 171.6 LBS | OXYGEN SATURATION: 96 % | HEART RATE: 68 BPM | DIASTOLIC BLOOD PRESSURE: 80 MMHG | HEIGHT: 63 IN | TEMPERATURE: 97.1 F | BODY MASS INDEX: 30.41 KG/M2 | SYSTOLIC BLOOD PRESSURE: 122 MMHG

## 2023-04-03 DIAGNOSIS — M54.42 CHRONIC BILATERAL LOW BACK PAIN WITH BILATERAL SCIATICA: ICD-10-CM

## 2023-04-03 DIAGNOSIS — G47.33 OSA ON CPAP: ICD-10-CM

## 2023-04-03 DIAGNOSIS — M54.12 CERVICAL RADICULOPATHY: ICD-10-CM

## 2023-04-03 DIAGNOSIS — Z00.00 PE (PHYSICAL EXAM), ANNUAL: Primary | ICD-10-CM

## 2023-04-03 DIAGNOSIS — J38.3 VOCAL CORD DYSFUNCTION: ICD-10-CM

## 2023-04-03 DIAGNOSIS — F41.1 GAD (GENERALIZED ANXIETY DISORDER): ICD-10-CM

## 2023-04-03 DIAGNOSIS — R91.8 LUNG NODULES: ICD-10-CM

## 2023-04-03 DIAGNOSIS — K21.9 GASTROESOPHAGEAL REFLUX DISEASE WITHOUT ESOPHAGITIS: ICD-10-CM

## 2023-04-03 DIAGNOSIS — N95.1 VASOMOTOR SYMPTOMS DUE TO MENOPAUSE: ICD-10-CM

## 2023-04-03 DIAGNOSIS — E78.00 HYPERCHOLESTEROLEMIA: ICD-10-CM

## 2023-04-03 DIAGNOSIS — Z12.31 ENCOUNTER FOR SCREENING MAMMOGRAM FOR BREAST CANCER: ICD-10-CM

## 2023-04-03 DIAGNOSIS — Z79.890 POSTMENOPAUSAL HRT (HORMONE REPLACEMENT THERAPY): ICD-10-CM

## 2023-04-03 DIAGNOSIS — E66.9 OBESITY (BMI 30-39.9): ICD-10-CM

## 2023-04-03 DIAGNOSIS — M54.41 CHRONIC BILATERAL LOW BACK PAIN WITH BILATERAL SCIATICA: ICD-10-CM

## 2023-04-03 DIAGNOSIS — M76.61 ACHILLES TENDINITIS, RIGHT LEG: ICD-10-CM

## 2023-04-03 DIAGNOSIS — G89.29 CHRONIC BILATERAL LOW BACK PAIN WITH BILATERAL SCIATICA: ICD-10-CM

## 2023-04-03 PROBLEM — R73.03 PREDIABETES: Status: RESOLVED | Noted: 2021-07-27 | Resolved: 2023-04-03

## 2023-04-03 PROCEDURE — 99397 PER PM REEVAL EST PAT 65+ YR: CPT | Performed by: FAMILY MEDICINE

## 2023-04-03 PROCEDURE — 99213 OFFICE O/P EST LOW 20 MIN: CPT | Mod: 25 | Performed by: FAMILY MEDICINE

## 2023-04-03 RX ORDER — PROGESTERONE 100 MG/1
100 CAPSULE ORAL DAILY
Qty: 90 CAPSULE | Refills: 1 | Status: SHIPPED | OUTPATIENT
Start: 2023-04-03 | End: 2023-09-11 | Stop reason: SDUPTHER

## 2023-04-03 RX ORDER — ESTRADIOL 0.5 MG/1
0.5 TABLET ORAL DAILY
Qty: 90 TABLET | Refills: 1 | Status: SHIPPED | OUTPATIENT
Start: 2023-04-03 | End: 2023-09-11 | Stop reason: SDUPTHER

## 2023-04-03 RX ORDER — MELOXICAM 15 MG/1
15 TABLET ORAL DAILY
COMMUNITY
Start: 2023-02-03 | End: 2023-04-27 | Stop reason: SDUPTHER

## 2023-04-03 ASSESSMENT — PATIENT HEALTH QUESTIONNAIRE - PHQ9: CLINICAL INTERPRETATION OF PHQ2 SCORE: 0

## 2023-04-03 ASSESSMENT — FIBROSIS 4 INDEX: FIB4 SCORE: 0.54

## 2023-04-03 NOTE — PROGRESS NOTES
"Subjective:   CC: aPE, HRT follow up, right leg pain     HPI:     Lizy Melros is a 67 y.o. female, established patient of the clinic.     Patient is doing well. She takes all medications as directed. She tolerates them well, no side effects reported.     She is scheduled to have surgery of the right achilles tendon on 4/20/2023.     She was recently started on HRT for persistent vasomotor symptoms that failed to respond to non-hormonal treatment. She tolerates HRT well. Her mood, sleep, energy, etc are improving. Patient is pleased with the outcome of the treatment. She wishes to continue HRT.     Current medicines (including changes today)  Current Outpatient Medications   Medication Sig Dispense Refill    meloxicam (MOBIC) 15 MG tablet Take 15 mg by mouth every day.      progesterone (PROMETRIUM) 100 MG Cap Take 1 Capsule by mouth every day. 90 Capsule 1    estradiol (ESTRACE) 0.5 MG tablet Take 1 Tablet by mouth every day. 90 Tablet 1    escitalopram (LEXAPRO) 20 MG tablet Take 1 Tablet by mouth every day. 90 Tablet 1    omeprazole (PRILOSEC) 20 MG delayed-release capsule Take 1 Capsule by mouth 1 time a day as needed (acid reflux). 90 Capsule 0    simvastatin (ZOCOR) 40 MG Tab TAKE 1 TABLET EVERY EVENING 90 Tablet 3    aspirin (ASA) 81 MG Chew Tab chewable tablet Chew 81 mg every day.       No current facility-administered medications for this visit.     She  has a past medical history of Alcohol abuse, Anxiety disorder (09/2019), Arthritis (09/2019), Depression (09/2019), High cholesterol, Liver disease, Restless leg syndrome, Sleep apnea, Snoring, and Urinary incontinence.    I reviewed patient's problem list, allergies, medications, family hx, social hx with patient and update EPIC.        Objective:     /80 (BP Location: Right arm, Patient Position: Sitting, BP Cuff Size: Adult)   Pulse 68   Temp 36.2 °C (97.1 °F) (Temporal)   Ht 1.6 m (5' 3\")   Wt 77.8 kg (171 lb 9.6 oz)   SpO2 96%  " Body mass index is 30.4 kg/m².    Physical Exam:  Constitutional: awake, alert, in no distress.  Skin: Warm, dry, good turgor, no rashes, bruises, ulcers in visible areas.  Eye: conjunctiva clear, lids neg for edema or lesions.  Neck: Trachea midline, no masses, no thyromegaly. No cervical or supraclavicular lymphadenopathy  Respiratory: Unlabored respiratory effort, lungs clear to auscultation, no wheezes, no rales.  Cardiovascular: Normal S1, S2, no murmur, no pedal edema.  Psych: Oriented x3, affect and mood wnl, intact judgement and insight.       Assessment and Plan:   The following treatment plan was discussed    1. VANI (generalized anxiety disorder)  Chronic, controlled with Lexapro 20 mg qd, no s/e reported, will continue.      2. Obesity (BMI 30-39.9)  - Patient identified as having weight management issue.  Appropriate orders and counseling given.    3. Gastroesophageal reflux disease without esophagitis  Chronic, controlled with Omeprazole 20 mg daily PRN , no s/e reported, will continue.      4. Hypercholesterolemia  Chronic, controlled with Zocor 40 mg qd, no s/e reported, will continue.      5. PRAVEEN on CPAP  Chronic, controlled with CPAP, no s/e reported, will continue.    - continue CPAP and f/u with sleep medicine as directed.     6. Lung nodules   Per LDCT done in 1/2023.   Patient is asymptomatic.   She is former smoker.   Repeat LDCT in 1/2024.     7. Vasomotor symptoms due to menopause  Chronic, controlled with Prometrium and Estradiol, no s/e reported, will continue.  Patient is aware of risks/benefits of HRT, especially in the setting of positive family history of breast cancer in mother.   Genetic studies done in 7/2022 was negative for BRCA 1/2.   - progesterone (PROMETRIUM) 100 MG Cap; Take 1 Capsule by mouth every day.  Dispense: 90 Capsule; Refill: 1  - estradiol (ESTRACE) 0.5 MG tablet; Take 1 Tablet by mouth every day.  Dispense: 90 Tablet; Refill: 1  - self breast exam   - annual  mammogram.     8. Chronic bilateral low back pain with bilateral sciatica  9. Cervical radiculopathy_s/p decompression and fusion by Dr Rincon in 12/2020  Doing well, asymptomatic, will continue to monitor.    10. Vocal cord dysfunction_Dr Hirsch  Patient had small mass on the vocal cord status post negative biopsy by Dr. Bonilla in November 2020.  She remains asymptomatic, will continue to monitor. Follow-up with Dr. Stephens as directed.    11. Encounter for screening mammogram for breast cancer  - MA-SCREENING MAMMO BILAT W/TOMOSYNTHESIS W/CAD; Future    12. PE (physical exam), annual  Labs per orders  Immunization discussed  Patient was counseled about skin care, diet, supplements, exercises.   Preventive cares reviewed, discussed, and updated as appropriate.       13. Postmenopausal HRT (hormone replacement therapy)  Patient was recently started on hormone replacement therapy for chronic, persistent vasomotor symptoms of menopause, not improving with nonhormonal treatment.  Patient is tolerating Prometrium and estradiol well.  She endorses improvement of mood, energy, hot flashes, sleep.  She tolerated both medication well, no side effect reported.  She is aware of risks/benefits associated with postmenopausal hormone replacement therapy.  - progesterone (PROMETRIUM) 100 MG Cap; Take 1 Capsule by mouth every day.  Dispense: 90 Capsule; Refill: 1  - estradiol (ESTRACE) 0.5 MG tablet; Take 1 Tablet by mouth every day.  Dispense: 90 Tablet; Refill: 1  -Follow-up in 6 months    14. Achilles tendinitis, right leg  Chronic Achilles tendinitis on the right leg  Patient is scheduled to have surgery done on 4/20/2023.  -Continue meloxicam 15 mg daily as needed  -Follow-up with orthopedic surgeon as directed.      Norma Holguin M.D.      Followup: Return in about 6 months (around 10/3/2023) for Multiple issues.    Please note that this dictation was created using voice recognition software. I have made every reasonable attempt  to correct obvious errors, but I expect that there are errors of grammar and possibly content that I did not discover before finalizing the note.

## 2023-04-05 ENCOUNTER — APPOINTMENT (OUTPATIENT)
Dept: RADIOLOGY | Facility: MEDICAL CENTER | Age: 68
End: 2023-04-05
Attending: FAMILY MEDICINE
Payer: COMMERCIAL

## 2023-04-05 DIAGNOSIS — Z12.31 ENCOUNTER FOR SCREENING MAMMOGRAM FOR BREAST CANCER: ICD-10-CM

## 2023-04-05 PROCEDURE — 77067 SCR MAMMO BI INCL CAD: CPT

## 2023-04-27 ENCOUNTER — TELEMEDICINE (OUTPATIENT)
Dept: MEDICAL GROUP | Age: 68
End: 2023-04-27
Payer: COMMERCIAL

## 2023-04-27 VITALS — BODY MASS INDEX: 30.12 KG/M2 | WEIGHT: 170 LBS | HEIGHT: 63 IN | RESPIRATION RATE: 14 BRPM

## 2023-04-27 DIAGNOSIS — M54.50 ACUTE EXACERBATION OF CHRONIC LOW BACK PAIN: ICD-10-CM

## 2023-04-27 DIAGNOSIS — M54.41 CHRONIC BILATERAL LOW BACK PAIN WITH BILATERAL SCIATICA: ICD-10-CM

## 2023-04-27 DIAGNOSIS — M54.42 CHRONIC BILATERAL LOW BACK PAIN WITH BILATERAL SCIATICA: ICD-10-CM

## 2023-04-27 DIAGNOSIS — M54.12 CERVICAL RADICULOPATHY: ICD-10-CM

## 2023-04-27 DIAGNOSIS — M76.61 ACHILLES TENDINITIS, RIGHT LEG: ICD-10-CM

## 2023-04-27 DIAGNOSIS — G89.29 CHRONIC BILATERAL LOW BACK PAIN WITH BILATERAL SCIATICA: ICD-10-CM

## 2023-04-27 DIAGNOSIS — G89.29 ACUTE EXACERBATION OF CHRONIC LOW BACK PAIN: ICD-10-CM

## 2023-04-27 PROCEDURE — 99214 OFFICE O/P EST MOD 30 MIN: CPT | Mod: 95 | Performed by: FAMILY MEDICINE

## 2023-04-27 RX ORDER — PREDNISONE 20 MG/1
40 TABLET ORAL DAILY
Qty: 10 TABLET | Refills: 0 | Status: SHIPPED | OUTPATIENT
Start: 2023-04-27 | End: 2023-05-02

## 2023-04-27 RX ORDER — TIZANIDINE 4 MG/1
4 TABLET ORAL NIGHTLY PRN
Qty: 30 TABLET | Refills: 0 | Status: SHIPPED
Start: 2023-04-27 | End: 2023-09-11

## 2023-04-27 RX ORDER — MELOXICAM 15 MG/1
15 TABLET ORAL DAILY
Qty: 30 TABLET | Refills: 0 | Status: SHIPPED | OUTPATIENT
Start: 2023-04-27 | End: 2023-05-29 | Stop reason: SDUPTHER

## 2023-04-27 ASSESSMENT — FIBROSIS 4 INDEX: FIB4 SCORE: 0.54

## 2023-04-28 NOTE — PROGRESS NOTES
Virtual Visit: Established Patient   This visit was conducted via Zoom using secure and encrypted videoconferencing technology.   The patient was in their home in the state of [place].    The patient's identity was confirmed and verbal consent was obtained for this virtual visit.    Subjective:   CC:   Chief Complaint   Patient presents with    Back Pain     Right side above hip    Medication Refill     Refill on meloxicam     Lizy Merlos is a 68 y.o. female presenting for evaluation and management of:    Patient recently had right-sided Achilles tendinitis operated.  Patient is on a scooter as directed by orthopedic surgeon.  Patient tripped and fell in front of the local post office on 3/28/2023.  She suffered acute right-sided low back pain.  Pain is described as sharp, nonradiating, worse with changing body position, better with rest.  Patient has been trying tizanidine at night which helps with sleep and pain.  Negative lower extremity weakness, paresthesia, bowel/bladder symptoms.    Patient had chronic lower back pain and cervical radiculopathy status post decompression and fusion by Dr. Rincon in December 2020.      Current medicines (including changes today)  Current Outpatient Medications   Medication Sig Dispense Refill    predniSONE (DELTASONE) 20 MG Tab Take 2 Tablets by mouth every day for 5 days. 10 Tablet 0    meloxicam (MOBIC) 15 MG tablet Take 1 Tablet by mouth every day. 30 Tablet 0    tizanidine (ZANAFLEX) 4 MG Tab Take 1 Tablet by mouth at bedtime as needed (muscle spasm). 30 Tablet 0    progesterone (PROMETRIUM) 100 MG Cap Take 1 Capsule by mouth every day. 90 Capsule 1    estradiol (ESTRACE) 0.5 MG tablet Take 1 Tablet by mouth every day. 90 Tablet 1    escitalopram (LEXAPRO) 20 MG tablet Take 1 Tablet by mouth every day. 90 Tablet 1    omeprazole (PRILOSEC) 20 MG delayed-release capsule Take 1 Capsule by mouth 1 time a day as needed (acid reflux). 90 Capsule 0    simvastatin (ZOCOR)  "40 MG Tab TAKE 1 TABLET EVERY EVENING 90 Tablet 3    aspirin (ASA) 81 MG Chew Tab chewable tablet Chew 81 mg every day.       No current facility-administered medications for this visit.       Patient Active Problem List    Diagnosis Date Noted    Achilles tendinitis, right leg 03/22/2023    Lung nodules_repeat LDCT 1/2024 01/19/2023    Obesity (BMI 30-39.9) 11/29/2022    History of tobacco abuse (Quit >6 mos ago) 11/29/2022    Postmenopausal HRT (hormone replacement therapy) 11/29/2022    Gastroesophageal reflux disease without esophagitis 06/21/2022    FHx: breast cancer 11/02/2021    Vocal cord dysfunction_Dr Hirsch 12/03/2020    Cervical radiculopathy_s/p decompression and fusion by Dr Rincon in 12/2020 09/10/2020    VANI (generalized anxiety disorder) 05/01/2020    Osteoarthritis of carpometacarpal (CMC) joint of both thumbs_Dr. Remy  03/27/2020    Hx of SCC_R temporal_removed in 3/2018 in [place] 03/27/2020    Heart murmur 03/27/2020    PRAVEEN on CPAP 11/20/2019    Former smoker 11/20/2019    Hypercholesterolemia 07/23/2019    Chronic bilateral low back pain with bilateral sciatica 07/23/2019        Objective:   Resp 14   Ht 1.6 m (5' 3\")   Wt 77.1 kg (170 lb)   LMP  (LMP Unknown)   BMI 30.11 kg/m²     Physical Exam:  Constitutional: Alert, no distress, well-groomed.  Skin: No rashes in visible areas.  Eye: Round. Conjunctiva clear, lids normal. No icterus.   ENMT: Lips pink without lesions, good dentition, moist mucous membranes. Phonation normal.  Neck: No masses, no thyromegaly. Moves freely without pain.  Respiratory: Unlabored respiratory effort, no cough or audible wheeze  Psych: Alert and oriented x3, normal affect and mood.     Assessment and Plan:   The following treatment plan was discussed:     1. Cervical radiculopathy_s/p decompression and fusion by Dr Rincon in 12/2020  2. Chronic bilateral low back pain with bilateral sciatica  3. Acute exacerbation of chronic low back pain  History and exam are " concerning for acute on chronic back pain. No red flags. Negative history of osteoporosis. Last DEXA was in 11/2021.   - predniSONE (DELTASONE) 20 MG Tab; Take 2 Tablets by mouth every day for 5 days.  Dispense: 10 Tablet; Refill: 0  - meloxicam (MOBIC) 15 MG tablet; Take 1 Tablet by mouth every day.  Dispense: 30 Tablet; Refill: 0  - Tylenol 500 mg Q6H PRN   - DX-LUMBAR SPINE-2 OR 3 VIEWS; Future  - Referral to Home Health: patient is unable to drive following recent surgery on the right leg.   - tizanidine (ZANAFLEX) 4 MG Tab; Take 1 Tablet by mouth at bedtime as needed (muscle spasm).  Dispense: 30 Tablet; Refill: 0  - activity modification.   - fall prevention   - follow up in 3 months or sooner as needed.     4. Achilles tendinitis, right leg  S/p surgical intervention on 4/20/2023.   Doing well. Follow up with Orthopedics as directed.        Follow-up: Return in about 3 months (around 7/27/2023).

## 2023-05-03 ENCOUNTER — HOSPITAL ENCOUNTER (OUTPATIENT)
Dept: RADIOLOGY | Facility: MEDICAL CENTER | Age: 68
End: 2023-05-03
Attending: FAMILY MEDICINE
Payer: COMMERCIAL

## 2023-05-03 PROCEDURE — 72100 X-RAY EXAM L-S SPINE 2/3 VWS: CPT

## 2023-05-18 ENCOUNTER — PHYSICAL THERAPY (OUTPATIENT)
Dept: PHYSICAL THERAPY | Facility: MEDICAL CENTER | Age: 68
End: 2023-05-18
Attending: ORTHOPAEDIC SURGERY
Payer: COMMERCIAL

## 2023-05-18 DIAGNOSIS — M25.371 UNSTABLE RIGHT ANKLE: ICD-10-CM

## 2023-05-18 DIAGNOSIS — M76.61 TENDONITIS, ACHILLES, RIGHT: ICD-10-CM

## 2023-05-18 DIAGNOSIS — M72.2 PLANTAR FASCIAL FIBROMATOSIS: ICD-10-CM

## 2023-05-18 PROCEDURE — 97162 PT EVAL MOD COMPLEX 30 MIN: CPT

## 2023-05-18 PROCEDURE — 97110 THERAPEUTIC EXERCISES: CPT

## 2023-05-18 ASSESSMENT — ENCOUNTER SYMPTOMS
PAIN SCALE: 3
AWAKENINGS PER NIGHT: 3
PAIN TIMING: UNABLE TO SPECIFY
PAIN SCALE AT LOWEST: 2
PAIN SCALE AT HIGHEST: 6
ALLEVIATING FACTORS: COLD/ICE

## 2023-05-18 ASSESSMENT — ACTIVITIES OF DAILY LIVING (ADL): POOR_BALANCE: 1

## 2023-05-18 NOTE — OP THERAPY EVALUATION
"  Outpatient Physical Therapy  INITIAL EVALUATION    [facility] Outpatient Physical Therapy  [address]  [place] [address]  Phone:  [phone]  Fax:  [phone]    Date of Evaluation: 05/18/2023    Patient: Lizy Merlos  YOB: 1955  MRN: [id number]     Referring Provider: Jake Jimenez M.D.  [address] [place] [address]   Referring Diagnosis Achilles tendinitis, right leg [M76.61];Plantar fascial fibromatosis of left foot [M72.2];Right ankle instability [M25.371]     Time Calculation  Start time: 0800  Stop time: 0845 Time Calculation (min): 45 minutes         Chief Complaint: Ankle Problem    Visit Diagnoses     ICD-10-CM   1. Tendonitis, Achilles, right  M76.61   2. Plantar fascial fibromatosis  M72.2   3. Unstable right ankle  M25.371       Date of onset of impairment: 5/18/2023    Subjective:   History of Present Illness:     Date of surgery:  4/20/2023    Mechanism of injury:    Lizy is a 68 y.o female who presents for post-operative care of her R achilles debridement/repair, R FHL debridement/release/transfer to calcneus, R calcaneal spur excision, scar revision, R peroneus brevis repair, R ATFL repair, R CFL repair on 4/20/23 with Dr. Jimenez.     Lizy reports she had an Achilles debridement repair at an outside institution about 1 year ago that never recovered well. She had persistent pain and \"what appears to be bony regrowth in the area. She is also having symptomatic peroneal symptoms and ankle instability.\"    Lizy is happy with her progress since the most recent surgery. \"It's so much better.\" There is some pain around the lateral ankle. It feels stiff. There is some numbness in her great toe that she believes may be related to her chronic lumbar issues. She continues to use a knee scooter for NWBing and is using a boot. Her most recent office visit (5/3/23) notes indicate she is cleared for WBing as tolerated in the " boot with anticipation of weaning from the boot after her follow up in 5 weeks.   Prior level of function:  Retired. 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  Sleep disturbance:  Interrupted sleep (Mostly disrupted by her lower back. Wears her boot at night)  # Times/Night awakened:  3  Pain:     Current pain rating:  3    At best pain ratin    At worst pain ratin    Pain timing:  Unable to specify    Relieving factors:  Cold/ice (neosporin on the scar)    Progression:  Improving  Social Support:     Lives in:  One-story house    Lives with:  Spouse  Treatments:     None    Patient Goals:     Patient goals for therapy:  Decreased pain, increased strength, increased motion and return to sport/leisure activities      Past Medical History:   Diagnosis Date    Alcohol abuse     Anxiety disorder 2019    Arthritis 2019    hips, spine, hands    Depression 2019    High cholesterol     Liver disease     Restless leg syndrome     Sleep apnea     cpap    Snoring     Urinary incontinence      Past Surgical History:   Procedure Laterality Date    PB REPAIR ACHILLES TENDON,SECONDARY Right 2023    Procedure: RIGHT ANKLE ACHILLES DEBRIDEMENT AND REPAIR;  Surgeon: Jake Jimenez M.D.;  Location: [facility];  Service: Orthopedics    PB XFER SINGLE DEEP LOW LEG TENDON Right 2023    Procedure: RIGHT REVISION FLEXOR HALLUCIS LONGUS TRANSFER;  Surgeon: Jake Jimenez M.D.;  Location: [facility];  Service: Orthopedics    PB REMOVAL OF HEEL BONE Right 2023    Procedure: RIGHT CALCANEAL SPUR EXCISION;  Surgeon: Jake Jimenez M.D.;  Location: [facility];  Service: Orthopedics    PB RELEASE TIB/FIB/ANKLE FLEX TENDON,EA Left 2023    Procedure: RIGHT PERONEAL TENOLYSIS;  Surgeon: Jake Jimenez M.D.;  Location: [facility];  Service: Orthopedics    PB REPAIR COLLAT ANKLE LIGMNT,SECONDARY Right 2023     Procedure: RIGHT LATERAL LIGAMENT REPAIR;  Surgeon: Jake Jimenez M.D.;  Location: [facility];  Service: Orthopedics    PB RUDOLPH W/O FACETEC FORAMOT/DSKC 1/2 VRT SEG, CE*  12/18/2020    Procedure: LAMINECTOMY, SPINE, CERVICAL, POSTERIOR APPROACH-STAGE#3 C6-T1 LAMI AND C5-7 FACETECTOMY;  Surgeon: Lavell Rincon M.D.;  Location: [facility];  Service: Neurosurgery    CERVICAL DISK AND FUSION ANTERIOR  12/18/2020    Procedure: DISCECTOMY, SPINE, CERVICAL, ANTERIOR APPROACH, WITH FUSION-STAGE #1 C3-5 DISCECTOMY WITH RECURRENT LARYNGEAL NERVE MONITORING SSEPS;  Surgeon: Lavell Rincon M.D.;  Location: [facility];  Service: Neurosurgery    CORPECTOMY  12/18/2020    Procedure: CORPECTOMY, SPINE-ANTERIOR C4;  Surgeon: Lavell Rincon M.D.;  Location: [facility];  Service: Neurosurgery    POSTERIOR CERVICAL FUSION O-ARM  12/18/2020    Procedure: FUSION, SPINE, CERVICAL, POSTERIOR APPROACH, WITH O-ARM IMAGING GUIDANCE-STAGE #2 C3-C6, C7-T3 LATERAL FUSION;  Surgeon: Lavell Rincon M.D.;  Location: [facility];  Service: Neurosurgery    NECK EXPLORATION Right 12/18/2020    Procedure: EXPLORATION, NECK AND CERVICAL SPINE EXPOSURE;  Surgeon: Lavell Rincon M.D.;  Location: [facility];  Service: Neurosurgery    PB SHLDR ARTHROSCOP,SURG,W/ROTAT CUFF REPB Left 10/1/2019    Procedure: ARTHROSCOPY, SHOULDER, WITH ROTATOR CUFF REPAIR;  Surgeon: Lavell Hooks M.D.;  Location: [facility];  Service: Orthopedics    NE SHLDR ARTHROSCOP,PART ACROMIOPLAS Left 10/1/2019    Procedure: DECOMPRESSION, SHOULDER, ARTHROSCOPIC - SUBACROMIAL W/LABRAL DEBRIDEMENT;  Surgeon: Lavell Hooks M.D.;  Location: [facility];  Service: Orthopedics    PB ARTHROSCOPY SHOULDER SURGICAL BICEPS TENODES* Left 10/1/2019    Procedure: ARTHROSCOPY, SHOULDER, WITH BICEPS TENODESIS - W/OPEN SUBPECTORAL TENODESIS, PROCEED AS INDICATED;  Surgeon:  Lavell Hooks M.D.;  Location: [facility];  Service: Orthopedics    CERVICAL DISK AND FUSION ANTERIOR   and     PRIMARY C SECTION      twice     SHOULDER SURGERY Left     Rotator cuff    SINUSCOPE      TONSILLECTOMY       Social History     Tobacco Use    Smoking status: Former     Packs/day: 1.00     Years: 40.00     Pack years: 40.00     Types: Cigarettes     Quit date:      Years since quittin.3    Smokeless tobacco: Never    Tobacco comments:     Ages [age]   Vaping Use    Vaping Use: Never used   Substance Use Topics    Alcohol use: Not Currently     Family and Occupational History     Socioeconomic History    Marital status:      Spouse name: Not on file    Number of children: 2    Years of education: Not on file    Highest education level: Not on file   Occupational History    Occupation: [employer] tech       Objective     Observations     Additional Observation Details    Closed incision at the R lateral and posterior ankle. No signs of infection. Posterior scar is adhesive throughout, but mostly at the superolateral aspect.     Neurological Testing     Sensation     Ankle/Foot   Left Ankle/Foot   Intact: light touch    Right Ankle/Foot   Intact: light touch     Palpation     Right   Tenderness of the lateral gastrocnemius, medial gastrocnemius, peroneus and soleus.     Tenderness     Right Ankle/Foot   Tenderness in the Achilles insertion, anterior talofibular ligament, calcaneofibular ligament, peroneal tendon and plantar fascia.     Active Range of Motion   Left Ankle/Foot   Dorsiflexion (ke): 10 degrees   Plantar flexion: 60 degrees   Inversion: 30 degrees   Eversion: 25 degrees     Right Ankle/Foot   Dorsiflexion (ke): 0 degrees   Plantar flexion: 25 degrees   Inversion: 30 degrees   Eversion: 15 degrees     Passive Range of Motion   Left Ankle/Foot    Dorsiflexion (ke): 20 degrees   Plantar flexion: 60 degrees   Inversion: 35 degrees   Eversion: 30 degrees      Right Ankle/Foot    Dorsiflexion (ke): 5 degrees   Plantar flexion: 30 degrees   Inversion: 30 degrees   Eversion: 25 degrees     Joint Play   Right Ankle/Foot     Distal tib-fib joint: hypomobile    Talocrural joint: hypomobile    Subtalar joint: hypomobile    Midfoot: hypomobile    Forefoot: within functional limits    Strength:      Left Ankle/Foot   Normal strength    Right Ankle/Foot   Dorsiflexion: 4  Plantar flexion: 2  Inversion: 3  Eversion: 3    Swelling   Left Ankle/Foot   Figure 8: 47 cm    Right Ankle/Foot   Figure 8: 50 cm        Therapeutic Exercises (CPT 45621):     1. Education: discussed PT eval findings, WBing status, goals. Recommended purchasing a lift for the L foot. Reviewed HEP below, HO provided.      Therapeutic Exercise Summary:   Access Code: [email or web address]  URL: [email or web address]  Date: 05/18/2023  Prepared by: Angelina Mccray    Exercises  - Seated Heel Raise  - 1 x daily - 20 reps  - Seated Calf Stretch with Strap  - 1 x daily - 1-3 min hold  - Seated Ankle Dorsiflexion Stretch  - 1 x daily - 1-3 min hold  - Seated Ankle Eversion with Resistance  - 1 x daily - 20 reps      Time-based treatments/modalities:    Physical Therapy Timed Treatment Charges  Therapeutic exercise minutes (CPT 15992): 10 minutes      Assessment, Response and Plan:   Impairments: abnormal gait, abnormal or restricted ROM, activity intolerance, impaired functional mobility, impaired balance, impaired physical strength, lacks appropriate home exercise program and limited ADL's    Assessment details:    Ms. Nat Merlos is a 68 y.o female who presents to PT for post-op care of her R achilles debridement/repair, R FHL debridement/release/transfer to calcneus, R calcaneal spur excision, scar revision, R peroneus brevis repair, R ATFL repair, R CFL repairperformed on 4/20/23 (5 weeks ago) by Dr. Jimenez. PT evaluation finds the pt to be doing well for this stage of recovery. Her pain levels are well  managed. She has expected findings of adhesive scarring, reduced ankle PROM/AROM, reduced strength most notable in the plantarflexor group and capsular swelling. She is complaint with use of her immobilizer boot. Chart notes indicate that she is clear for WBing in her boot, though she was not aware and has still been NWBing with her scooter. Tolerated WBing in the boot very well today. Will benefit from a riser on the L to reduce load in her lumbar spine. Skilled PT services are indicated to assist through the post-op stages of recovery, address the mentioned functional limitations and enhance QOL.     Prognosis: good    Goals:   Short Term Goals:     - Improve R ankle DF to 20 degrees passively  - Improve R ankle PF MMT to 3/5  - Pt weans fully from use of her kneeling scooter, ambulating only with the boot  Short term goal time span:  1-2 weeks      Long Term Goals:      - Pt is able to ambulate at least 1 mile without external support bracing and less than 2/10 pain  - Pt reports LEFS at least 60/80 or greater  - Pt is able to squat to WFL depth without deviation  - Pt demonstrates independence with a HEP for continued management of this problem beyond discharge from therapy.     Long term goal time span:  6-8 weeks    Plan:   Therapy options:  Physical therapy treatment to continue  Planned therapy interventions:  E Stim Unattended (CPT 86315), Therapeutic Activities (CPT 69939), Therapeutic Exercise (CPT 84024), Gait Training (CPT 03785), Hot or Cold Pack Therapy (CPT 77441), Manual Therapy (CPT 92964), Neuromuscular Re-education (CPT 02638) and Self Care ADL Training (CPT 23457)  Frequency: 1-2x/week.  Duration in weeks:  8  Discussed with:  Patient      Functional Assessment Used    LEFS= 27/80    Referring provider co-signature:  I have reviewed this plan of care and my co-signature certifies the need for services.    Certification Period: 05/18/2023 to  07/13/23    Physician Signature:  ________________________________ Date: ______________

## 2023-05-23 DIAGNOSIS — E78.00 HYPERCHOLESTEROLEMIA: ICD-10-CM

## 2023-05-23 NOTE — OP THERAPY DAILY TREATMENT
"  Outpatient Physical Therapy  DAILY TREATMENT     [facility] Outpatient Physical Therapy  [address]  [place] [address]  Phone:  [phone]  Fax:  [phone]    Date: 05/24/2023    Patient: Lizy Merlos  YOB: 1955  MRN: [id number]     Time Calculation    Start time: 0800  Stop time: 0845 Time Calculation (min): 45 minutes         Chief Complaint: Ankle Problem    Visit #: 2    SUBJECTIVE:  Has weaned off the kneeling scooter. \"It's doing well.\"     OBJECTIVE:      Therapeutic Exercises (CPT 71134):     1. NuStep, L1 x 5 min, goal to keep the R heel down.    2. gastroc/soleus stretch, x 1 min    3. 4 way ankle TB, grn x 20 ea    4. seated self great toe ext stretch, x 1 min    5. doming, x 20    6. toe swapping, x 3 min, attempts    7. great toe flexion, grn TB x 20    8. BAPs, L2      Therapeutic Exercise Summary:   Access Code: [email or web address]  URL: [email or web address]  Date: 05/18/2023  Prepared by: Angelina Mccray    Exercises  - Seated Heel Raise  - 1 x daily - 20 reps  - Seated Calf Stretch with Strap  - 1 x daily - 1-3 min hold  - Seated Ankle Dorsiflexion Stretch  - 1 x daily - 1-3 min hold  - Seated Ankle Eversion with Resistance  - 1 x daily - 20 reps    Therapeutic Treatments and Modalities:     1. Manual Therapy (CPT 03914), Prone: STM to R gastroc/soleus/plantar foot. Scar/achilles mobs. GIII distal tib/fib, R GT, R subtalar jt.    Time-based treatments/modalities:    Physical Therapy Timed Treatment Charges  Manual therapy minutes (CPT 68909): 15 minutes  Therapeutic exercise minutes (CPT 19984): 30 minutes    ASSESSMENT:   Response to treatment: Pain levels well controlled and allowing excellent tolerance to therapy. Improving ankle AROM. Good tolerance to the walking boot. Does have a slight superficial re-opening of the superior aspect of her incision related to rubbing in the boot. Is now wearing taller socks, will " monitor    PLAN/RECOMMENDATIONS:   Plan for treatment: therapy treatment to continue next visit.  Planned interventions for next visit: continue with current treatment.

## 2023-05-24 ENCOUNTER — PHYSICAL THERAPY (OUTPATIENT)
Dept: PHYSICAL THERAPY | Facility: MEDICAL CENTER | Age: 68
End: 2023-05-24
Attending: ORTHOPAEDIC SURGERY
Payer: COMMERCIAL

## 2023-05-24 DIAGNOSIS — M25.371 UNSTABLE RIGHT ANKLE: ICD-10-CM

## 2023-05-24 DIAGNOSIS — M76.61 TENDONITIS, ACHILLES, RIGHT: ICD-10-CM

## 2023-05-24 DIAGNOSIS — M72.2 PLANTAR FASCIAL FIBROMATOSIS: ICD-10-CM

## 2023-05-24 PROCEDURE — 97140 MANUAL THERAPY 1/> REGIONS: CPT

## 2023-05-24 PROCEDURE — 97110 THERAPEUTIC EXERCISES: CPT

## 2023-05-26 RX ORDER — SIMVASTATIN 40 MG
TABLET ORAL
Qty: 90 TABLET | Refills: 3 | Status: SHIPPED | OUTPATIENT
Start: 2023-05-26

## 2023-05-29 DIAGNOSIS — G89.29 ACUTE EXACERBATION OF CHRONIC LOW BACK PAIN: ICD-10-CM

## 2023-05-29 DIAGNOSIS — M54.50 ACUTE EXACERBATION OF CHRONIC LOW BACK PAIN: ICD-10-CM

## 2023-05-30 RX ORDER — MELOXICAM 15 MG/1
15 TABLET ORAL DAILY
Qty: 30 TABLET | Refills: 0 | Status: SHIPPED | OUTPATIENT
Start: 2023-05-30 | End: 2023-10-19 | Stop reason: SDUPTHER

## 2023-05-30 NOTE — OP THERAPY DAILY TREATMENT
Outpatient Physical Therapy  DAILY TREATMENT     [facility] Outpatient Physical Therapy  [address]  [place] [address]  Phone:  [phone]  Fax:  [phone]    Date: 05/31/2023    Patient: Lizy Merlos  YOB: 1955  MRN: [id number]     Time Calculation    Start time: 0803  Stop time: 0847 Time Calculation (min): 44 minutes         Chief Complaint: Ankle Problem    Visit #: 3    SUBJECTIVE:  My ankle is excellent. I did well after last visit. I think it is starting to move better.     OBJECTIVE:    R great toe extension improved to 40 deg passively, strong over pressure. 20 degrees actively.   R ankle AROM: PF= 45 deg, DF= 10, IV/EV= 25 degrees      Therapeutic Exercises (CPT 72084):     1. NuStep, L3 x 5 min, goal to keep the R heel down.    2. gastroc/soleus stretch, x 1 min    3. Shuttle, Squat (bilateral 4C x 20, SL 3C x 20) Heel Raise (bilateral 4C x 20)    4. seated self great toe ext stretch, manually    5. seated heel raise, 15# KB on knee. x 20    6. TB ankle eversion, grn x 20    7. STS, x 15 standard and x 15 with the R LE back, Good control, no pain, appropriate effort with R loaded posterior.      Therapeutic Exercise Summary:   Access Code: [email or web address]  URL: [email or web address]  Date: 05/18/2023  Prepared by: Angelina Mccray    Exercises  - Seated Heel Raise  - 1 x daily - 20 reps  - Seated Calf Stretch with Strap  - 1 x daily - 1-3 min hold  - Seated Ankle Dorsiflexion Stretch  - 1 x daily - 1-3 min hold  - Seated Ankle Eversion with Resistance  - 1 x daily - 20 reps    Therapeutic Treatments and Modalities:     1. Manual Therapy (CPT 99975), Prone: STM to R gastroc/soleus/plantar foot. Scar/achilles mobs. GIII distal tib/fib, R GT, R subtalar jt.    Time-based treatments/modalities:    Physical Therapy Timed Treatment Charges  Manual therapy minutes (CPT 91769): 14 minutes  Therapeutic exercise minutes (CPT 66153): 30  minutes    ASSESSMENT:   Response to treatment: Lizy is making excellent progress. Her pain has been well controlled since the most recent surgery. She is ambulating in the boot without an AD with good tolerance. AROM continues to improve each visit, enhanced by manual techniques. She tolerates external loads in all planes with little compensation and no pain. Anticipate she will be able to wean from the boot quickly when released by Dr. Jimenez.     PLAN/RECOMMENDATIONS:   Plan for treatment: therapy treatment to continue next visit.  Planned interventions for next visit: continue with current treatment.

## 2023-05-31 ENCOUNTER — PHYSICAL THERAPY (OUTPATIENT)
Dept: PHYSICAL THERAPY | Facility: MEDICAL CENTER | Age: 68
End: 2023-05-31
Attending: ORTHOPAEDIC SURGERY
Payer: COMMERCIAL

## 2023-05-31 DIAGNOSIS — M76.61 TENDONITIS, ACHILLES, RIGHT: ICD-10-CM

## 2023-05-31 DIAGNOSIS — M25.371 UNSTABLE RIGHT ANKLE: ICD-10-CM

## 2023-05-31 DIAGNOSIS — M72.2 PLANTAR FASCIAL FIBROMATOSIS: ICD-10-CM

## 2023-05-31 PROCEDURE — 97140 MANUAL THERAPY 1/> REGIONS: CPT

## 2023-05-31 PROCEDURE — 97110 THERAPEUTIC EXERCISES: CPT

## 2023-06-08 ENCOUNTER — PHYSICAL THERAPY (OUTPATIENT)
Dept: PHYSICAL THERAPY | Facility: MEDICAL CENTER | Age: 68
End: 2023-06-08
Attending: ORTHOPAEDIC SURGERY
Payer: COMMERCIAL

## 2023-06-08 DIAGNOSIS — M72.2 PLANTAR FASCIAL FIBROMATOSIS: ICD-10-CM

## 2023-06-08 DIAGNOSIS — M25.371 UNSTABLE RIGHT ANKLE: ICD-10-CM

## 2023-06-08 DIAGNOSIS — M76.61 TENDONITIS, ACHILLES, RIGHT: ICD-10-CM

## 2023-06-08 PROCEDURE — 97140 MANUAL THERAPY 1/> REGIONS: CPT

## 2023-06-08 PROCEDURE — 97110 THERAPEUTIC EXERCISES: CPT

## 2023-06-08 NOTE — OP THERAPY DAILY TREATMENT
Outpatient Physical Therapy  DAILY TREATMENT     [facility] Outpatient Physical Therapy  [address]  [place] [address]  Phone:  [phone]  Fax:  [phone]    Date: 06/08/2023    Patient: Lizy Merlos  YOB: 1955  MRN: [id number]     Time Calculation    Start time: 1414  Stop time: 1458 Time Calculation (min): 44 minutes         Chief Complaint: Ankle Problem    Visit #: 4    SUBJECTIVE:  Discharged from the boot yesterday. Has fully discontinued its use and is ambulating with just sneakers into the clinic today. Still no pain. Some swelling causing tightness in the shoe.     OBJECTIVE:      Therapeutic Exercises (CPT 98838):     1. NuStep, L3 x 5 min, goal to keep the R heel down.    2. gastroc/soleus stretch, x 1 min    3. Shuttle, Squat (bilateral 5C x 20, SL 4C x 20) Heel Raise (bilateral 4C x 20, SL 3C x 10- very difficult)    4. wall leaning ankle DF, x 20, challenging    5. standing bicycle kicks for foot/ankle stability, orange x 20 ea, intermittent UE support    6. TB side steps with band at feet, x 20 steps ea, orange      Therapeutic Exercise Summary:   Access Code: [email or web address]  URL: [email or web address]  Date: 06/08/2023  Prepared by: Angelina Mccray    Exercises  - Gastroc Stretch on Wall  - 3 x daily - 5 x weekly - 3 sets - 10 reps - 5 sec hold  - Standing Heel Raises  - 3 x daily - 5 x weekly - 3 sets - 10 reps - 5 sec hold  - Single Leg Stance  - 3 x daily - 5 x weekly - 3 sets - 10 reps - 5 sec hold    Therapeutic Treatments and Modalities:     1. Manual Therapy (CPT 22391), Prone: STM to R gastroc/soleus/plantar foot. Scar/achilles mobs. GIII distal tib/fib, R GT, R subtalar jt.    Time-based treatments/modalities:    Physical Therapy Timed Treatment Charges  Manual therapy minutes (CPT 84528): 9 minutes  Therapeutic exercise minutes (CPT 24259): 35 minutes    ASSESSMENT:   Response to treatment: Smooth transition out  of her boot. Slightly increased swelling noted through the rearfoot, but otherwise no concerning signs. DF ROM improved to 15 degrees passively. Weak plantar flexors, as would be expected. Unable to single limb heel raise.     PLAN/RECOMMENDATIONS:   Plan for treatment: therapy treatment to continue next visit.  Planned interventions for next visit: continue with current treatment.

## 2023-06-16 ENCOUNTER — PHYSICAL THERAPY (OUTPATIENT)
Dept: PHYSICAL THERAPY | Facility: MEDICAL CENTER | Age: 68
End: 2023-06-16
Attending: ORTHOPAEDIC SURGERY
Payer: COMMERCIAL

## 2023-06-16 DIAGNOSIS — M25.371 UNSTABLE RIGHT ANKLE: ICD-10-CM

## 2023-06-16 DIAGNOSIS — M76.61 TENDONITIS, ACHILLES, RIGHT: ICD-10-CM

## 2023-06-16 DIAGNOSIS — M72.2 PLANTAR FASCIAL FIBROMATOSIS: ICD-10-CM

## 2023-06-16 PROCEDURE — 97110 THERAPEUTIC EXERCISES: CPT

## 2023-06-16 NOTE — OP THERAPY DAILY TREATMENT
"  Outpatient Physical Therapy  DAILY TREATMENT     [facility] Outpatient Physical Therapy  [address]  [place] [address]  Phone:  [phone]  Fax:  [phone]    Date: 06/16/2023    Patient: Lizy Merlos  YOB: 1955  MRN: [id number]     Time Calculation    Start time: 0845  Stop time: 0929 Time Calculation (min): 44 minutes         Chief Complaint: Ankle Problem    Visit #: 5    SUBJECTIVE:  I'm doing well. Working hard on being able to stand on my toes, but I can't do it on the R yet. Pain is still well controlled. Some stiffness during deep squats.     OBJECTIVE:      Therapeutic Exercises (CPT 65817):     1. NuStep, L3 x 5 min, goal to keep the R heel down.    2. gastroc/soleus stretch, x 1 min    3. Shuttle, Squat (SL 4C x 20. Foot low for increased DF) Heel Raise (bilateral 4C x 20, SL 3C x 15)    4. 1/2 depth lunge hold, 2x 20\" ea    5. eccentric step down, 4\" x 15-20 ea, light touch support for balance during R stance    6. wall leaning ankle DF, x 20    7. TB side stepping, band around midfoot, orange 3x5'    8. rocker board, AP and lateral x 1 min ea    9. airex balance: staggered stance with trunk rotation, x 1 min ea    10. bilateral support deceleration practice (through heel raise, no jump), x 20 reps      Therapeutic Exercise Summary:   Access Code: [email or web address]  URL: [email or web address]  Date: 06/08/2023  Prepared by: Angelina Mccray    Exercises  - Gastroc Stretch on Wall  - 3 x daily - 5 x weekly - 3 sets - 10 reps - 5 sec hold  - Standing Heel Raises  - 3 x daily - 5 x weekly - 3 sets - 10 reps - 5 sec hold  - Single Leg Stance  - 3 x daily - 5 x weekly - 3 sets - 10 reps - 5 sec hold      Time-based treatments/modalities:    Physical Therapy Timed Treatment Charges  Therapeutic exercise minutes (CPT 96022): 44 minutes    ASSESSMENT:   Response to treatment: Continues to be highly motivated and show excellent progress with " therapy. Will need to further develop PF strength on the R, but likely will take some time considering the dysfunction has been present for 2 years. Balance is showing good return. Improved eccentric control in deep ankle DF compared to last visit.     PLAN/RECOMMENDATIONS:   Plan for treatment: therapy treatment to continue next visit.  Planned interventions for next visit: continue with current treatment.

## 2023-06-20 NOTE — OP THERAPY DAILY TREATMENT
"  Outpatient Physical Therapy  DAILY TREATMENT     [facility] Outpatient Physical Therapy  [address]  [place] [address]  Phone:  [phone]  Fax:  [phone]    Date: 06/21/2023    Patient: Lizy Merlos  YOB: 1955  MRN: [id number]     Time Calculation    Start time: 0759  Stop time: 0845 Time Calculation (min): 46 minutes         Chief Complaint: Ankle Problem    Visit #: 6    SUBJECTIVE:  My foot felt so good after last visit. I can really tell things are loosening up.     OBJECTIVE:  -Lower Extremity Functional Scale Percentage: 72.5  (58/80)  -R ankle AROM/PROM: DF= 8/16 deg, PF= 43/50 deg, IV= 20/25 deg, EV= 14/18 deg.   -Knee to wall ankle DF: L= 3.5 inches, R= 2 inches  -Heel raises: bilateral= 31 reps max effort, Unable to SL heel raise on the R. Is just beginning to be able to eccentrically lower if supported to the top position.   -SL balance: R= 24 seconds, L= 1 min+        Therapeutic Exercises (CPT 98481):     1. TM, 2.3 mph at 2 incline x 5 min    2. gastroc/soleus stretch, x 1 min    3. Shuttle, Squat (B 7C x 20, SL 4C x 20. Foot low for increased DF) Heel Raise (bilateral 4C x 20, SL 3C x 15)    4. Eccentric SL heel raise, x 8 (max effort)    5. eccentric step down, 4\" x 15-20 ea, light touch support for balance during R stance    6. wall leaning ankle DF, x 20    7. star balance, attempts x 2 min    8. reassessment of objective measures (above)      Therapeutic Exercise Summary:   Access Code: [email or web address]  URL: [email or web address]  Date: 06/21/2023  Prepared by: Angelina Mccray    Exercises  - Gastroc Stretch on Wall  - 60 sec hold  - Standing Eccentric Heel Raise  - 1 x daily - 10 reps  - Single Leg Balance with Opposite Leg Star Reach  - 1 x daily      Time-based treatments/modalities:    Physical Therapy Timed Treatment Charges  Therapeutic exercise minutes (CPT 75200): 45 minutes      ASSESSMENT:   Response to " treatment: See PN    PLAN/RECOMMENDATIONS:   Plan for treatment: therapy treatment to continue next visit.  Planned interventions for next visit: continue with current treatment.

## 2023-06-21 ENCOUNTER — PHYSICAL THERAPY (OUTPATIENT)
Dept: PHYSICAL THERAPY | Facility: MEDICAL CENTER | Age: 68
End: 2023-06-21
Attending: ORTHOPAEDIC SURGERY
Payer: COMMERCIAL

## 2023-06-21 DIAGNOSIS — M25.371 UNSTABLE RIGHT ANKLE: ICD-10-CM

## 2023-06-21 DIAGNOSIS — M76.61 TENDONITIS, ACHILLES, RIGHT: ICD-10-CM

## 2023-06-21 DIAGNOSIS — M72.2 PLANTAR FASCIAL FIBROMATOSIS: ICD-10-CM

## 2023-06-21 PROCEDURE — 97110 THERAPEUTIC EXERCISES: CPT

## 2023-06-21 NOTE — OP THERAPY PROGRESS SUMMARY
Outpatient Physical Therapy  PROGRESS SUMMARY NOTE      [facility] Outpatient Physical Therapy  [address]  [place] [address]  Phone:  [phone]  Fax:  [phone]    Date of Visit: 06/21/2023    Patient: Lizy Merlos  YOB: 1955  MRN: [id number]     Referring Provider: Jake Jimenez M.D.  [address] [place] [address]   Referring Diagnosis Achilles tendinitis, right leg [M76.61];Plantar fascial fibromatosis [M72.2];Other instability, right ankle [M25.371]     Visit Diagnoses     ICD-10-CM   1. Tendonitis, Achilles, right  M76.61   2. Plantar fascial fibromatosis  M72.2   3. Unstable right ankle  M25.371       Rehab Potential: excellent    Progress Report Period: 5/18/23-6/21/23    Functional Assessment Used  Lower Extremity Functional Scale Percentage: 72.5  (58/80= 27.5% disability)      Objective Findings and Assessment:   Patient progression towards goals:   Lizy has completed 6 PT sessions for post-op care of her R achilles debridement/repair, R FHL debridement/release/transfer to calcneus, R calcaneal spur excision, scar revision, R peroneus brevis repair, R ATFL repair, R CFL repairperformed on 4/20/23 (10 weeks ago) by Dr. Jimenez. Treatment has included manual techniques, progressive therex for restoration of mobility and strength, as well as balance training. Lizy is making excellent progress overall. Her disability score has reduced from 72% to 28.5% on the LEFS. She is demonstrating near normal AROM/PROM of the R ankle. Slight restrictions remain with ankle dorsiflexion and great toe extension. She is regaining strength in her plantarflexors through good return of her bilateral heel raise, but will need further strengthening to regain unilateral heel raise. There have been steady improvements in single limb balance, but max effort time is still about 50% reduced compared to the uninvolved. Considering the progress made  and the continued impairments, continuation of skilled PT services is indicated to assist with return to PLOF including hikes on uneven terrain.     Objective findings and assessment details:   -R ankle AROM/PROM: DF= 8/16 deg, PF= 43/50 deg, IV= 20/25 deg, EV= 14/18 deg.   - Great toe extension passively= 35 deg  -Knee to wall ankle DF: L= 3.5 inches, R= 2 inches  -Heel raises: bilateral= 31 reps max effort, Unable to SL heel raise on the R. Is just beginning to be able to eccentrically lower if supported to the top position.   - MMTs of ankle DF, EV, IV= 5/5  -SL balance: R= 24 seconds, L= 1 min+     Goals:   Short Term Goals:     - Pt is able to HR on the R x 3 reps  - Pt is able to SL balance on the R x 1 min    Short term goal time span:  1-2 weeks      Long Term Goals:      - Pt is able to hike 2 miles on uneven ground without pain  - Improve LEFS to less than 15%  - Pt demonstrates independence with a HEP for continued management of this problem beyond discharge from therapy.     Long term goal time span:  6-8 weeks    Plan:   Planned therapy interventions:  Functional Training, Self Care (CPT 57509), Therapeutic Activities (CPT 58746), Therapeutic Exercise (CPT 88036), Manual Therapy (CPT 59117), Neuromuscular Re-education (CPT 31533) and E Stim Unattended (CPT 67498)  Frequency:  1x week  Duration in weeks:  8      Referring provider co-signature:  I have reviewed this plan of care and my co-signature certifies the need for services.     Certification Period: 06/21/2023 to 08/16/23    Physician Signature: ________________________________ Date: ______________

## 2023-06-28 ENCOUNTER — APPOINTMENT (OUTPATIENT)
Dept: PHYSICAL THERAPY | Facility: MEDICAL CENTER | Age: 68
End: 2023-06-28
Attending: ORTHOPAEDIC SURGERY
Payer: COMMERCIAL

## 2023-07-05 ENCOUNTER — APPOINTMENT (OUTPATIENT)
Dept: PHYSICAL THERAPY | Facility: MEDICAL CENTER | Age: 68
End: 2023-07-05
Attending: ORTHOPAEDIC SURGERY
Payer: COMMERCIAL

## 2023-07-12 ENCOUNTER — APPOINTMENT (OUTPATIENT)
Dept: PHYSICAL THERAPY | Facility: MEDICAL CENTER | Age: 68
End: 2023-07-12
Attending: ORTHOPAEDIC SURGERY
Payer: COMMERCIAL

## 2023-07-26 DIAGNOSIS — K21.9 GASTROESOPHAGEAL REFLUX DISEASE WITHOUT ESOPHAGITIS: ICD-10-CM

## 2023-07-27 RX ORDER — OMEPRAZOLE 20 MG/1
CAPSULE, DELAYED RELEASE ORAL
Qty: 90 CAPSULE | Refills: 3 | Status: SHIPPED | OUTPATIENT
Start: 2023-07-27 | End: 2023-09-11

## 2023-08-17 ENCOUNTER — PATIENT MESSAGE (OUTPATIENT)
Dept: SLEEP MEDICINE | Facility: MEDICAL CENTER | Age: 68
End: 2023-08-17

## 2023-08-17 ENCOUNTER — APPOINTMENT (OUTPATIENT)
Dept: URGENT CARE | Facility: CLINIC | Age: 68
End: 2023-08-17
Payer: COMMERCIAL

## 2023-08-17 ENCOUNTER — OFFICE VISIT (OUTPATIENT)
Dept: URGENT CARE | Facility: CLINIC | Age: 68
End: 2023-08-17
Payer: COMMERCIAL

## 2023-08-17 VITALS
RESPIRATION RATE: 16 BRPM | HEIGHT: 63 IN | BODY MASS INDEX: 29.95 KG/M2 | OXYGEN SATURATION: 92 % | TEMPERATURE: 96.9 F | HEART RATE: 68 BPM | WEIGHT: 169 LBS | SYSTOLIC BLOOD PRESSURE: 122 MMHG | DIASTOLIC BLOOD PRESSURE: 70 MMHG

## 2023-08-17 DIAGNOSIS — T14.8XXA WOUND OF SKIN: ICD-10-CM

## 2023-08-17 PROCEDURE — 90715 TDAP VACCINE 7 YRS/> IM: CPT | Performed by: FAMILY MEDICINE

## 2023-08-17 PROCEDURE — 90471 IMMUNIZATION ADMIN: CPT | Performed by: FAMILY MEDICINE

## 2023-08-17 PROCEDURE — 99213 OFFICE O/P EST LOW 20 MIN: CPT | Mod: 25 | Performed by: FAMILY MEDICINE

## 2023-08-17 PROCEDURE — 3074F SYST BP LT 130 MM HG: CPT | Performed by: FAMILY MEDICINE

## 2023-08-17 PROCEDURE — 3078F DIAST BP <80 MM HG: CPT | Performed by: FAMILY MEDICINE

## 2023-08-17 ASSESSMENT — FIBROSIS 4 INDEX: FIB4 SCORE: 0.54

## 2023-08-17 ASSESSMENT — ENCOUNTER SYMPTOMS: ROS SKIN COMMENTS: CUT THUMB

## 2023-08-17 NOTE — PROGRESS NOTES
Subjective     Lizy Merlos is a 68 y.o. female who presents with Other (Sliced left thumb on monday)      - This is a pleasant and nontoxic appearing 68 y.o. person who has come to the walk-in clinic today for:    #1) cut Lt thumb slicing potatoes 3 days ago. Is due for tetanus booster      ALLERGIES:  Patient has no known allergies.     PMH:  Past Medical History:   Diagnosis Date    Alcohol abuse     Anxiety disorder 09/2019    Arthritis 09/2019    hips, spine, hands    Depression 09/2019    High cholesterol     Liver disease     Restless leg syndrome     Sleep apnea     cpap    Snoring     Urinary incontinence         PSH:  Past Surgical History:   Procedure Laterality Date    PB REPAIR ACHILLES TENDON,SECONDARY Right 4/20/2023    Procedure: RIGHT ANKLE ACHILLES DEBRIDEMENT AND REPAIR;  Surgeon: Jake Jimenez M.D.;  Location: [facility];  Service: Orthopedics    PB XFER SINGLE DEEP LOW LEG TENDON Right 4/20/2023    Procedure: RIGHT REVISION FLEXOR HALLUCIS LONGUS TRANSFER;  Surgeon: Jake Jimenez M.D.;  Location: [facility];  Service: Orthopedics    PB REMOVAL OF HEEL BONE Right 4/20/2023    Procedure: RIGHT CALCANEAL SPUR EXCISION;  Surgeon: Jake Jimenez M.D.;  Location: [facility];  Service: Orthopedics    PB RELEASE TIB/FIB/ANKLE FLEX TENDON,EA Left 4/20/2023    Procedure: RIGHT PERONEAL TENOLYSIS;  Surgeon: Jake Jimenez M.D.;  Location: [facility];  Service: Orthopedics    PB REPAIR COLLAT ANKLE LIGMNT,SECONDARY Right 4/20/2023    Procedure: RIGHT LATERAL LIGAMENT REPAIR;  Surgeon: Jake Jimenez M.D.;  Location: [facility];  Service: Orthopedics    PB RUDOLPH W/O FACETEC FORAMOT/DSKC 1/2 VRT SEG, CE*  12/18/2020    Procedure: LAMINECTOMY, SPINE, CERVICAL, POSTERIOR APPROACH-STAGE#3 C6-T1 LAMI AND C5-7 FACETECTOMY;  Surgeon: Lavell Rincon M.D.;  [place]   Service: Neurosurgery    CERVICAL DISK AND FUSION ANTERIOR  12/18/2020    Procedure: DISCECTOMY, SPINE, CERVICAL, ANTERIOR APPROACH, WITH FUSION-STAGE #1 C3-5 DISCECTOMY WITH RECURRENT LARYNGEAL NERVE MONITORING SSEPS;  Surgeon: Lavell Rincon M.D.;  Location: [facility];  Service: Neurosurgery    CORPECTOMY  12/18/2020    Procedure: CORPECTOMY, SPINE-ANTERIOR C4;  Surgeon: Lavell Rincon M.D.;  Location: [facility];  Service: Neurosurgery    POSTERIOR CERVICAL FUSION O-ARM  12/18/2020    Procedure: FUSION, SPINE, CERVICAL, POSTERIOR APPROACH, WITH O-ARM IMAGING GUIDANCE-STAGE #2 C3-C6, C7-T3 LATERAL FUSION;  Surgeon: Lavell Rincon M.D.;  Location: [facility];  Service: Neurosurgery    NECK EXPLORATION Right 12/18/2020    Procedure: EXPLORATION, NECK AND CERVICAL SPINE EXPOSURE;  Surgeon: Lavell Rincon M.D.;  Location: [facility];  Service: Neurosurgery    PB SHLDR ARTHROSCOP,SURG,W/ROTAT CUFF REPB Left 10/1/2019    Procedure: ARTHROSCOPY, SHOULDER, WITH ROTATOR CUFF REPAIR;  Surgeon: Lavell Hooks M.D.;  Location: [facility];  Service: Orthopedics    NM SHLDR ARTHROSCOP,PART ACROMIOPLAS Left 10/1/2019    Procedure: DECOMPRESSION, SHOULDER, ARTHROSCOPIC - SUBACROMIAL W/LABRAL DEBRIDEMENT;  Surgeon: Lavell Hooks M.D.;  Location: [facility];  Service: Orthopedics    PB ARTHROSCOPY SHOULDER SURGICAL BICEPS TENODES* Left 10/1/2019    Procedure: ARTHROSCOPY, SHOULDER, WITH BICEPS TENODESIS - W/OPEN SUBPECTORAL TENODESIS, PROCEED AS INDICATED;  Surgeon: Lavell Hooks M.D.;  Location: [facility];  Service: Orthopedics    CERVICAL DISK AND FUSION ANTERIOR  1993 and 1994    PRIMARY C SECTION      twice     SHOULDER SURGERY Left     Rotator cuff    SINUSCOPE      TONSILLECTOMY         MEDS:    Current Outpatient Medications:     mupirocin (BACTROBAN) 2 % Ointment, Apply 1 Application topically 2 times a day.,  "Disp: 22 g, Rfl: 0    simvastatin (ZOCOR) 40 MG Tab, TAKE 1 TABLET EVERY EVENING, Disp: 90 Tablet, Rfl: 3    omeprazole (PRILOSEC) 20 MG delayed-release capsule, TAKE 1 CAPSULE DAILY AS NEEDED FOR ACID AND REFLUX, Disp: 90 Capsule, Rfl: 3    meloxicam (MOBIC) 15 MG tablet, Take 1 Tablet by mouth every day., Disp: 30 Tablet, Rfl: 0    tizanidine (ZANAFLEX) 4 MG Tab, Take 1 Tablet by mouth at bedtime as needed (muscle spasm)., Disp: 30 Tablet, Rfl: 0    progesterone (PROMETRIUM) 100 MG Cap, Take 1 Capsule by mouth every day., Disp: 90 Capsule, Rfl: 1    estradiol (ESTRACE) 0.5 MG tablet, Take 1 Tablet by mouth every day., Disp: 90 Tablet, Rfl: 1    escitalopram (LEXAPRO) 20 MG tablet, Take 1 Tablet by mouth every day., Disp: 90 Tablet, Rfl: 1    aspirin (ASA) 81 MG Chew Tab chewable tablet, Chew 81 mg every day., Disp: , Rfl:     ** I have documented what I find to be significant in regards to past medical, social, family and surgical history  in my HPI or under PMH/PSH/FH review section, otherwise it is noncontributory **         HPI    Review of Systems   Skin:         Cut thumb   All other systems reviewed and are negative.             Objective     /70   Pulse 68   Temp 36.1 °C (96.9 °F)   Resp 16   Ht 1.6 m (5' 3\")   Wt 76.7 kg (169 lb)   LMP  (LMP Unknown)   SpO2 92%   BMI 29.94 kg/m²      Physical Exam  Vitals and nursing note reviewed.   Constitutional:       General: She is not in acute distress.     Appearance: Normal appearance. She is well-developed.   HENT:      Head: Normocephalic.   Pulmonary:      Effort: Pulmonary effort is normal. No respiratory distress.   Musculoskeletal:        Hands:       Comments: Lt Thumb: distal aspect w/ ~1.5x1cm semi avulsion lac. Good srom   Neurological:      Mental Status: She is alert.      Motor: No abnormal muscle tone.   Psychiatric:         Mood and Affect: Mood normal.         Behavior: Behavior normal.             Assessment & Plan     1. Wound of " skin  mupirocin (BACTROBAN) 2 % Ointment          - Dx, plan & d/c instructions discussed   - wound clean/dressed  - wound care discussed   - OTC Motrin and/or Tylenol as needed  - wound check in 4-5 days as needed  - E.R. precautions discussed     Follow up with your regular primary care providers office for a recheck on today's visit. ER if not improving in 2-3 days or if feeling/getting worse. (If you do not have a primary care provider and need to schedule one you may call [facility] at [phone] to do this).    Any realistic side effects of medications that may have been given today reviewed.     Patient left in stable condition     Pertinent prior office visit notes in [employer] have been reviewed by me today on day of this visit.

## 2023-08-18 ENCOUNTER — PATIENT MESSAGE (OUTPATIENT)
Dept: MEDICAL GROUP | Age: 68
End: 2023-08-18
Payer: COMMERCIAL

## 2023-08-22 ENCOUNTER — PATIENT MESSAGE (OUTPATIENT)
Dept: MEDICAL GROUP | Age: 68
End: 2023-08-22
Payer: COMMERCIAL

## 2023-08-22 DIAGNOSIS — G47.33 OSA ON CPAP: ICD-10-CM

## 2023-08-31 DIAGNOSIS — F33.42 RECURRENT MAJOR DEPRESSIVE DISORDER, IN FULL REMISSION (HCC): ICD-10-CM

## 2023-08-31 DIAGNOSIS — F41.1 GAD (GENERALIZED ANXIETY DISORDER): ICD-10-CM

## 2023-09-01 RX ORDER — ESCITALOPRAM OXALATE 20 MG/1
20 TABLET ORAL DAILY
Qty: 90 TABLET | Refills: 3 | Status: SHIPPED | OUTPATIENT
Start: 2023-09-01 | End: 2023-12-22 | Stop reason: SDUPTHER

## 2023-09-11 ENCOUNTER — OFFICE VISIT (OUTPATIENT)
Dept: MEDICAL GROUP | Facility: MEDICAL CENTER | Age: 68
End: 2023-09-11
Payer: COMMERCIAL

## 2023-09-11 VITALS
OXYGEN SATURATION: 96 % | WEIGHT: 171.63 LBS | SYSTOLIC BLOOD PRESSURE: 116 MMHG | HEIGHT: 63 IN | HEART RATE: 61 BPM | TEMPERATURE: 97.5 F | DIASTOLIC BLOOD PRESSURE: 76 MMHG | BODY MASS INDEX: 30.41 KG/M2

## 2023-09-11 DIAGNOSIS — F41.1 GAD (GENERALIZED ANXIETY DISORDER): ICD-10-CM

## 2023-09-11 DIAGNOSIS — R91.8 LUNG NODULES: ICD-10-CM

## 2023-09-11 DIAGNOSIS — G47.33 OSA ON CPAP: ICD-10-CM

## 2023-09-11 DIAGNOSIS — Z79.890 POSTMENOPAUSAL HRT (HORMONE REPLACEMENT THERAPY): ICD-10-CM

## 2023-09-11 DIAGNOSIS — Z87.891 HISTORY OF TOBACCO ABUSE: ICD-10-CM

## 2023-09-11 DIAGNOSIS — E78.00 HYPERCHOLESTEROLEMIA: ICD-10-CM

## 2023-09-11 DIAGNOSIS — E66.9 OBESITY (BMI 30-39.9): ICD-10-CM

## 2023-09-11 DIAGNOSIS — N95.1 VASOMOTOR SYMPTOMS DUE TO MENOPAUSE: ICD-10-CM

## 2023-09-11 DIAGNOSIS — K21.9 GASTROESOPHAGEAL REFLUX DISEASE WITHOUT ESOPHAGITIS: ICD-10-CM

## 2023-09-11 PROCEDURE — 3078F DIAST BP <80 MM HG: CPT | Performed by: FAMILY MEDICINE

## 2023-09-11 PROCEDURE — 99214 OFFICE O/P EST MOD 30 MIN: CPT | Performed by: FAMILY MEDICINE

## 2023-09-11 PROCEDURE — 3074F SYST BP LT 130 MM HG: CPT | Performed by: FAMILY MEDICINE

## 2023-09-11 RX ORDER — ESTRADIOL 0.5 MG/1
0.5 TABLET ORAL
Qty: 90 TABLET | Refills: 1 | Status: SHIPPED | OUTPATIENT
Start: 2023-09-11

## 2023-09-11 RX ORDER — PROGESTERONE 100 MG/1
100 CAPSULE ORAL DAILY
Qty: 90 CAPSULE | Refills: 1 | Status: SHIPPED | OUTPATIENT
Start: 2023-09-11 | End: 2024-02-06

## 2023-09-11 ASSESSMENT — FIBROSIS 4 INDEX: FIB4 SCORE: 0.54

## 2023-09-11 NOTE — PROGRESS NOTES
Subjective:   CC: 6-month follow up, HRT     HPI:     Liyz Merlos is a 68 y.o. female, established patient of the clinic.     Patient is doing well. She is taking all medication as directed.  She tolerates them well, no side effect reported.  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  She continues to follow-up with her psychiatrist for management of mental health.  She uses CPAP regularly for sleep apnea.  She needs CPAP supplies.  She had negative experience with [facility] sleep medicine and wishes not to return to this clinic.  She is considering hypoglossal nerve stimulator for treatment of sleep apnea.  She had consultation with [facility] last October 2023.  She continues to struggle with CPAP and wishes to be referred back to [facility] for consultation for hypoglossal nerve stimulator.  Patient is due for low-dose CT for lung cancer screening in 1/2024.  No active respiratory symptoms reported.    Current medicines (including changes today)  Current Outpatient Medications   Medication Sig Dispense Refill    progesterone (PROMETRIUM) 100 MG Cap Take 1 Capsule by mouth every day. 90 Capsule 1    estradiol (ESTRACE) 0.5 MG tablet Take 1 Tablet by mouth every 48 hours. 90 Tablet 1    Digestive Enzymes (DIGESTIVE ENZYME PO) Take  by mouth.      magnesium citrate Solution Take 300 mL by mouth one time.      escitalopram (LEXAPRO) 20 MG tablet TAKE 1 TABLET DAILY 90 Tablet 3    meloxicam (MOBIC) 15 MG tablet Take 1 Tablet by mouth every day. 30 Tablet 0    simvastatin (ZOCOR) 40 MG Tab TAKE 1 TABLET EVERY EVENING 90 Tablet 3     No current facility-administered medications for this visit.     She  has a past medical history of Alcohol abuse, Anxiety disorder (09/2019), Arthritis (09/2019), Depression (09/2019), High cholesterol, Liver disease, Restless leg syndrome, Sleep apnea, Snoring, and Urinary incontinence.    I reviewed patient's problem list, allergies,  "medications, family hx, social hx with patient and update EPIC.        Objective:     /76 (BP Location: Left arm, Patient Position: Sitting, BP Cuff Size: Adult)   Pulse 61   Temp 36.4 °C (97.5 °F) (Temporal)   Ht 1.6 m (5' 3\")   Wt 77.9 kg (171 lb 10.1 oz)   SpO2 96%  Body mass index is 30.4 kg/m².    Physical Exam:  Constitutional: awake, alert, in no distress.  Skin: Warm, dry, good turgor, no rashes, bruises, ulcers in visible areas.  Eye: conjunctiva clear, lids neg for edema or lesions.  Neck: Trachea midline, no masses, no thyromegaly. No cervical or supraclavicular lymphadenopathy  Respiratory: Unlabored respiratory effort, lungs clear to auscultation, no wheezes, no rales.  Cardiovascular: Normal S1, S2, no murmur, no pedal edema.  Psych: Oriented x3, affect and mood wnl, intact judgement and insight.       Assessment and Plan:   The following treatment plan was discussed    1. Postmenopausal HRT (hormone replacement therapy)  2. Vasomotor symptoms due to menopause  Patient has chronic, severe persistent vasomotor symptoms of menopause.  She tried and failed multiple over-the-counter and prescribed medication in the past including SSRI.  She has been on estradiol and Prometrium for 6 months.  Her symptoms are under good control.  She is aware of risks/benefits of chronic hormone replacement therapy, especially in the setting of familial history of breast cancer.  She is willing to accept the risks due to severity of her symptoms.  Her mom was diagnosed with breast cancer in her 60s.  Patient has not had negative genetic study.  -Continue progesterone (PROMETRIUM) 100 MG Cap; Take 1 Capsule by mouth every day.  Dispense: 90 Capsule; Refill: 1  -We will try to reduce estradiol to 0.5 mg every 48 hours: Estradiol (ESTRACE) 0.5 MG tablet; Take 1 Tablet by mouth every 48 hours.  Dispense: 90 Tablet; Refill: 1    3. VANI (generalized anxiety disorder)  Chronic, currently being managed by psychiatry, " patient is on Lexapro 20 mg daily.  Her symptoms are under good control.    4. Hypercholesterolemia  Chronic, controlled with simvastatin 40 mg daily, no s/e reported, will continue.    - Comp Metabolic Panel; Future  - CBC WITH DIFFERENTIAL; Future  - Lipid Profile; Future    5. Obesity (BMI 30-39.9)  - Patient identified as having weight management issue.  Appropriate orders and counseling given.     6. Gastroesophageal reflux disease without esophagitis  Chronic, patient was weaned off PPI last week due to concerns of side effects.  She states that she learns that PPI is bad for her from TikTok video.  She is able to control her symptoms with dietary and lifestyle modification at this time.  -Continue omeprazole as needed.  -Dietary modification recommended and discussed with patient.    7. PRAVEEN on CPAP  Chronic, uses CPAP consistently.  However, she continues to struggle with CPAP nightly.  She had a bad experience with [facility] sleep medicine and wishes not to return to this clinic.  She is considering hypoglossal nerve stimulator and wishes to be referred to [facility] for this.  She had initial consultation with [facility] last October.  - DME Mask and Supplies  - Referral to ENT    8. Lung nodules_repeat LDCT 1/2024  - CT-LUNG CANCER-SCREENING; Future    9. History of tobacco abuse (Quit >6 mos ago)  - CT-LUNG CANCER-SCREENING; Future      Ly ZENAIDA Holguin M.D.      Followup: Return in about 6 months (around 3/11/2024) for Multiple issues.    Please note that this dictation was created using voice recognition software. I have made every reasonable attempt to correct obvious errors, but I expect that there are errors of grammar and possibly content that I did not discover before finalizing the note.

## 2023-10-18 ENCOUNTER — TELEPHONE (OUTPATIENT)
Dept: PHYSICAL THERAPY | Facility: MEDICAL CENTER | Age: 68
End: 2023-10-18
Payer: COMMERCIAL

## 2023-10-18 NOTE — OP THERAPY DISCHARGE SUMMARY
Outpatient Physical Therapy  DISCHARGE SUMMARY NOTE      [facility] Outpatient Physical Therapy  [address]  [place] [address]  Phone:  [phone]  Fax:  [phone]    Date of Visit: 10/18/2023    Patient: Lizy Merlos  YOB: 1955  MRN: [id number]     Referring Provider: Jake Jimenez M.D.  [address] [facility] [place] [address]   Referring Diagnosis Achilles tendinitis, right leg [M76.61];Plantar fascial fibromatosis of left foot [M72.2];Right ankle instability [M25.371]         Functional Assessment Used        Your patient is being discharged from Physical Therapy with the following comments:   Patient has failed to schedule or reschedule follow-up visits    Comments:  Lizy has not been seen since her visit 6/21/23. A progress note was completed that date of service outlining her improvements with therapy. Anticipating continuation for higher level strength and balance training. Will d/c due to lapse in care. Requires a new referral to re-establish.      Angelina Mccray, PT, DPT    Date: 10/18/2023         
Statement Selected

## 2023-10-19 DIAGNOSIS — M54.50 ACUTE EXACERBATION OF CHRONIC LOW BACK PAIN: ICD-10-CM

## 2023-10-19 DIAGNOSIS — G89.29 ACUTE EXACERBATION OF CHRONIC LOW BACK PAIN: ICD-10-CM

## 2023-10-19 RX ORDER — MELOXICAM 15 MG/1
15 TABLET ORAL DAILY
Qty: 30 TABLET | Refills: 0 | Status: SHIPPED | OUTPATIENT
Start: 2023-10-19

## 2023-12-19 ENCOUNTER — PATIENT MESSAGE (OUTPATIENT)
Dept: MEDICAL GROUP | Facility: MEDICAL CENTER | Age: 68
End: 2023-12-19
Payer: COMMERCIAL

## 2023-12-19 DIAGNOSIS — G89.29 ACUTE EXACERBATION OF CHRONIC LOW BACK PAIN: ICD-10-CM

## 2023-12-19 DIAGNOSIS — M54.50 ACUTE EXACERBATION OF CHRONIC LOW BACK PAIN: ICD-10-CM

## 2023-12-20 RX ORDER — TIZANIDINE 4 MG/1
4 TABLET ORAL NIGHTLY PRN
Qty: 30 TABLET | Refills: 2 | Status: SHIPPED | OUTPATIENT
Start: 2023-12-20

## 2023-12-22 ENCOUNTER — TELEMEDICINE (OUTPATIENT)
Dept: BEHAVIORAL HEALTH | Facility: CLINIC | Age: 68
End: 2023-12-22
Payer: COMMERCIAL

## 2023-12-22 DIAGNOSIS — F33.42 RECURRENT MAJOR DEPRESSIVE DISORDER, IN FULL REMISSION (HCC): ICD-10-CM

## 2023-12-22 DIAGNOSIS — F41.1 GAD (GENERALIZED ANXIETY DISORDER): ICD-10-CM

## 2023-12-22 PROCEDURE — 99214 OFFICE O/P EST MOD 30 MIN: CPT | Mod: GT | Performed by: PSYCHIATRY & NEUROLOGY

## 2023-12-22 RX ORDER — BUSPIRONE HYDROCHLORIDE 15 MG/1
15 TABLET ORAL 2 TIMES DAILY
Qty: 180 TABLET | Refills: 1 | Status: SHIPPED | OUTPATIENT
Start: 2023-12-22 | End: 2024-03-22 | Stop reason: SDUPTHER

## 2023-12-22 RX ORDER — ESCITALOPRAM OXALATE 20 MG/1
20 TABLET ORAL DAILY
Qty: 90 TABLET | Refills: 3 | Status: SHIPPED | OUTPATIENT
Start: 2023-12-22 | End: 2024-03-22 | Stop reason: SDUPTHER

## 2023-12-22 NOTE — PROGRESS NOTES
This evaluation was conducted via Zoom using secure and encrypted videoconferencing technology. The patient was in their home in the [facility].    The patient's identity was confirmed and verbal consent was obtained for this virtual visit.      PSYCHIATRY FOLLOW-UP NOTE      Name: Lizy Merlos  MRN: [id number]  : 1955  Age: 68 y.o.  Date of assessment: 2023  PCP: Norma Holguin M.D.  Persons in attendance: Patient      REASON FOR VISIT/CHIEF COMPLAINT (as stated by Patient):  Lizy Merlos is a 68 y.o., White female, attending follow-up appointment for mood and anxiety management.      HISTORY OF PRESENT ILLNESS:  Lizy Merlos is a 68 y.o. old female with MDD ad VANI comes in today for follow up. Patient was last seen 5 months ago, and following treatment planning recommendations were done:  Continue Lexapro 20 mg daily for depression and anxiety management.    Stop Buspirone  Patient currently not interested in psychotherapy, but will begin couple counseling soon.  To continue follow-up with primary care physician for management of comorbid medical conditions.    Compliant with Lexapro 20 mg.  Anxiety is persisting.  Relationship struggles with  causing high anxiety.   diagnosed with ADHD, which is helping.  Anxiety improving but persisting. Describes as this coming on suddenly with irritability with fears of taking action.   Motivated to consider therapy.      CURRENT MEDICATIONS:  Current Outpatient Medications   Medication Sig Dispense Refill    tizanidine (ZANAFLEX) 4 MG Tab Take 1 Tablet by mouth at bedtime as needed (muscle spasm). 30 Tablet 2    meloxicam (MOBIC) 15 MG tablet Take 1 Tablet by mouth every day. 30 Tablet 0    progesterone (PROMETRIUM) 100 MG Cap Take 1 Capsule by mouth every day. 90 Capsule 1    estradiol (ESTRACE) 0.5 MG tablet Take 1 Tablet by mouth every 48 hours. 90 Tablet 1    Digestive Enzymes (DIGESTIVE ENZYME PO) Take  by mouth.       magnesium citrate Solution Take 300 mL by mouth one time.      escitalopram (LEXAPRO) 20 MG tablet TAKE 1 TABLET DAILY 90 Tablet 3    simvastatin (ZOCOR) 40 MG Tab TAKE 1 TABLET EVERY EVENING 90 Tablet 3     No current facility-administered medications for this visit.       MEDICAL HISTORY  Past Medical History:   Diagnosis Date    Alcohol abuse     Anxiety disorder 09/2019    Arthritis 09/2019    hips, spine, hands    Depression 09/2019    High cholesterol     Liver disease     Restless leg syndrome     Sleep apnea     cpap    Snoring     Urinary incontinence      Past Surgical History:   Procedure Laterality Date    PB REPAIR ACHILLES TENDON,SECONDARY Right 4/20/2023    Procedure: RIGHT ANKLE ACHILLES DEBRIDEMENT AND REPAIR;  Surgeon: Jake Jimenze M.D.;  Location: [facility];  Service: Orthopedics    PB XFER SINGLE DEEP LOW LEG TENDON Right 4/20/2023    Procedure: RIGHT REVISION FLEXOR HALLUCIS LONGUS TRANSFER;  Surgeon: Jake Jimenez M.D.;  Location: [facility];  Service: Orthopedics    PB REMOVAL OF HEEL BONE Right 4/20/2023    Procedure: RIGHT CALCANEAL SPUR EXCISION;  Surgeon: Jake Jimenez M.D.;  Location: [facility];  Service: Orthopedics    PB RELEASE TIB/FIB/ANKLE FLEX TENDON,EA Left 4/20/2023    Procedure: RIGHT PERONEAL TENOLYSIS;  Surgeon: Jake Jimenez M.D.;  Location: [facility];  Service: Orthopedics    PB REPAIR COLLAT ANKLE LIGMNT,SECONDARY Right 4/20/2023    Procedure: RIGHT LATERAL LIGAMENT REPAIR;  Surgeon: Jake Jimenez M.D.;  Location: [facility];  Service: Orthopedics    PB RUDOLPH W/O FACETEC FORAMOT/DSKC 1/2 VRT SEG, CE*  12/18/2020    Procedure: LAMINECTOMY, SPINE, CERVICAL, POSTERIOR APPROACH-STAGE#3 C6-T1 LAMI AND C5-7 FACETECTOMY;  Surgeon: Lavell Rincon M.D.;  Location: [facility];  Service: Neurosurgery    CERVICAL DISK AND FUSION ANTERIOR  12/18/2020     Procedure: DISCECTOMY, SPINE, CERVICAL, ANTERIOR APPROACH, WITH FUSION-STAGE #1 C3-5 DISCECTOMY WITH RECURRENT LARYNGEAL NERVE MONITORING SSEPS;  Surgeon: Lavell Rincon M.D.;  Location: [facility];  Service: Neurosurgery    CORPECTOMY  12/18/2020    Procedure: CORPECTOMY, SPINE-ANTERIOR C4;  Surgeon: Lavell Rincon M.D.;  Location: [facility];  Service: Neurosurgery    POSTERIOR CERVICAL FUSION O-ARM  12/18/2020    Procedure: FUSION, SPINE, CERVICAL, POSTERIOR APPROACH, WITH O-ARM IMAGING GUIDANCE-STAGE #2 C3-C6, C7-T3 LATERAL FUSION;  Surgeon: Lavell Rincon M.D.;  Location: [facility];  Service: Neurosurgery    NECK EXPLORATION Right 12/18/2020    Procedure: EXPLORATION, NECK AND CERVICAL SPINE EXPOSURE;  Surgeon: Lavell Rincon M.D.;  Location: [facility];  Service: Neurosurgery    PB SHLDR ARTHROSCOP,SURG,W/ROTAT CUFF REPB Left 10/1/2019    Procedure: ARTHROSCOPY, SHOULDER, WITH ROTATOR CUFF REPAIR;  Surgeon: Lvaell Hooks M.D.;  Location: [facility];  Service: Orthopedics    SC SHLDR ARTHROSCOP,PART ACROMIOPLAS Left 10/1/2019    Procedure: DECOMPRESSION, SHOULDER, ARTHROSCOPIC - SUBACROMIAL W/LABRAL DEBRIDEMENT;  Surgeon: Lavell Hooks M.D.;  Location: [facility];  Service: Orthopedics    PB ARTHROSCOPY SHOULDER SURGICAL BICEPS TENODES* Left 10/1/2019    Procedure: ARTHROSCOPY, SHOULDER, WITH BICEPS TENODESIS - W/OPEN SUBPECTORAL TENODESIS, PROCEED AS INDICATED;  Surgeon: Lavell Hooks M.D.;  Location: [facility];  Service: Orthopedics    CERVICAL DISK AND FUSION ANTERIOR  1993 and 1994    PRIMARY C SECTION      twice     SHOULDER SURGERY Left     Rotator cuff    SINUSCOPE      TONSILLECTOMY         PAST PSYCHIATRIC MEDICATIONS  Lexapro  Mirtazapine-increased food craving  Gabapentin  Do not remember names of other medication trials       REVIEW OF SYSTEMS:        Constitutional negative   Eyes  negative   Ears/Nose/Mouth/Throat negative   Cardiovascular negative   Respiratory negative   Gastrointestinal negative   Genitourinary negative   Muscular  chronic back pain   Integumentary negative   Neurological negative   Endocrine negative   Hematologic/Lymphatic negative     PHYSICAL EXAMINAION:  Vital signs: LMP  (LMP Unknown)   Musculoskeletal: Normal gait.   Abnormal movements: none      MENTAL STATUS EXAMINATION      General:   - Grooming and hygiene: Casual,   - Apparent distress: none,   - Behavior: Tense  - Eye Contact:  Good,   - no psychomotor agitation or retardation    - Participation: Active verbal participation  Orientation: Alert and Fully Oriented to person, place and time  Mood: Anxious  Affect: Flexible and Full range,  Thought Process: Logical and Goal-directed  Thought Content: Denies suicidal or homicidal ideations, intent or plan   Perception: Denies auditory or visual hallucinations. No delusions noted   Attention span and concentration: Fair  Speech:Rate within normal limits and Volume within normal limits  Language: Appropriate   Insight: Good  Judgment: Good  Recent and remote memory: No gross evidence of memory deficits        DEPRESSION SCREENIN/8/2021     7:58 AM 2022     1:00 PM 4/3/2023     7:40 AM   Depression Screen (PHQ-2/PHQ-9)   PHQ-2 Total Score 0     PHQ-2 Total Score  0 0   PHQ-9 Total Score 0         Interpretation of PHQ-9 Total Score   Score Severity   1-4 No Depression   5-9 Mild Depression   10-14 Moderate Depression   15-19 Moderately Severe Depression   20-27 Severe Depression    CURRENT RISK:       Suicidal: Low       Homicidal: Low       Self-Harm: Low       Relapse: Low       Crisis Safety Plan Reviewed Not Indicated       If evidence of imminent risk is present, intervention/plan:      MEDICAL RECORDS/LABS/DIAGNOSTIC TESTS REVIEWED:  No new lab since last visit     [place]  records -   Reviewed     PLAN:  (1) Major depressive disorder; (2) Generalized  Anxiety Disorder; (3) r/o Mood disorder secondary to medical conditions.  Anxiety persisting  Continue Lexapro 20 mg daily for depression and anxiety management.    Add Buspirone 7.5 mg BID X 2 weeks --> 15 mg BID for anxiety management.    Motivated to consider psychotherapy for mood and anxiety management.   To continue follow-up with primary care physician for management of comorbid medical conditions.  Medication options, alternatives (including no medications) and medication risks/benefits/side effects were discussed in detail.  Explained importance of contraceptive measures while on psychotropic medications, educated to let provider know if ever pregnant or wanting to become pregnant. Verbalized understanding.  The patient was advised to call, message provider on [employer], or come in to the clinic if symptoms worsen or if any future questions/issues regarding their medications arise; the patient verbalized understanding and agreement.    The patient was educated to call 911, call the suicide hotline, or go to local ER if having thoughts of suicide or homicide; verbalized understanding.    Billing Coding based on:  17393 based on MDM    Return to clinic in 3 months or sooner if symptoms worsen.  Next Appointment: instruction provided on how to make the next appointment.     The proposed treatment plan was discussed with the patient who was provided the opportunity to ask questions and make suggestions regarding alternative treatment. Patient verbalized understanding and expressed agreement with the plan.       Darian Pathak M.D.  12/22/23    This note was created using voice recognition software (Dragon). The accuracy of the dictation is limited by the abilities of the software. I have reviewed the note prior to signing, however some errors in grammar and context are still possible. If you have any questions related to this note please do not hesitate to contact our office.

## 2023-12-27 ENCOUNTER — APPOINTMENT (OUTPATIENT)
Dept: MEDICAL GROUP | Facility: MEDICAL CENTER | Age: 68
End: 2023-12-27
Payer: COMMERCIAL

## 2024-01-22 ENCOUNTER — TELEMEDICINE (OUTPATIENT)
Dept: BEHAVIORAL HEALTH | Facility: CLINIC | Age: 69
End: 2024-01-22
Payer: COMMERCIAL

## 2024-01-22 DIAGNOSIS — F41.1 GAD (GENERALIZED ANXIETY DISORDER): ICD-10-CM

## 2024-01-22 DIAGNOSIS — F33.42 RECURRENT MAJOR DEPRESSIVE DISORDER, IN FULL REMISSION (HCC): ICD-10-CM

## 2024-01-22 PROCEDURE — 90791 PSYCH DIAGNOSTIC EVALUATION: CPT | Performed by: MARRIAGE & FAMILY THERAPIST

## 2024-01-22 ASSESSMENT — PATIENT HEALTH QUESTIONNAIRE - PHQ9
5. POOR APPETITE OR OVEREATING: 0 - NOT AT ALL
SUM OF ALL RESPONSES TO PHQ QUESTIONS 1-9: 8
CLINICAL INTERPRETATION OF PHQ2 SCORE: 1

## 2024-01-22 ASSESSMENT — ANXIETY QUESTIONNAIRES
3. WORRYING TOO MUCH ABOUT DIFFERENT THINGS: MORE THAN HALF THE DAYS
7. FEELING AFRAID AS IF SOMETHING AWFUL MIGHT HAPPEN: MORE THAN HALF THE DAYS
2. NOT BEING ABLE TO STOP OR CONTROL WORRYING: NEARLY EVERY DAY
GAD7 TOTAL SCORE: 13
1. FEELING NERVOUS, ANXIOUS, OR ON EDGE: MORE THAN HALF THE DAYS
5. BEING SO RESTLESS THAT IT IS HARD TO SIT STILL: NOT AT ALL
4. TROUBLE RELAXING: MORE THAN HALF THE DAYS
6. BECOMING EASILY ANNOYED OR IRRITABLE: MORE THAN HALF THE DAYS
IF YOU CHECKED OFF ANY PROBLEMS ON THIS QUESTIONNAIRE, HOW DIFFICULT HAVE THESE PROBLEMS MADE IT FOR YOU TO DO YOUR WORK, TAKE CARE OF THINGS AT HOME, OR GET ALONG WITH OTHER PEOPLE: VERY DIFFICULT
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
## 2024-02-04 DIAGNOSIS — Z79.890 POSTMENOPAUSAL HRT (HORMONE REPLACEMENT THERAPY): ICD-10-CM

## 2024-02-04 DIAGNOSIS — N95.1 VASOMOTOR SYMPTOMS DUE TO MENOPAUSE: ICD-10-CM

## 2024-02-05 NOTE — TELEPHONE ENCOUNTER
Received request via: Pharmacy    Was the patient seen in the last year in this department? Yes    Does the patient have an active prescription (recently filled or refills available) for medication(s) requested? No    [facility]    Does the patient have [facility] Plus and need 100 day supply (blood pressure, diabetes and cholesterol meds only)? Patient does not have SCP    Future Appointments         Provider Department [place]    2/22/2024 12:30 PM Lavell [facility]clean [facility] [address]    3/11/2024 7:40 AM (Arrive by 7:25 AM) Norma Holguin M.D. [facility]    3/22/2024 8:00 AM Darian Pathak M.D. [facility] [address]

## 2024-02-06 RX ORDER — PROGESTERONE 100 MG/1
100 CAPSULE ORAL DAILY
Qty: 90 CAPSULE | Refills: 1 | Status: SHIPPED | OUTPATIENT
Start: 2024-02-06

## 2024-02-15 ENCOUNTER — TELEPHONE (OUTPATIENT)
Dept: HEALTH INFORMATION MANAGEMENT | Facility: OTHER | Age: 69
End: 2024-02-15
Payer: COMMERCIAL

## 2024-02-22 ENCOUNTER — APPOINTMENT (OUTPATIENT)
Dept: BEHAVIORAL HEALTH | Facility: CLINIC | Age: 69
End: 2024-02-22
Payer: COMMERCIAL

## 2024-03-11 ENCOUNTER — HOSPITAL ENCOUNTER (OUTPATIENT)
Dept: RADIOLOGY | Facility: MEDICAL CENTER | Age: 69
End: 2024-03-11
Attending: FAMILY MEDICINE
Payer: COMMERCIAL

## 2024-03-11 DIAGNOSIS — R91.8 LUNG NODULES: ICD-10-CM

## 2024-03-11 DIAGNOSIS — Z87.891 HISTORY OF TOBACCO ABUSE: ICD-10-CM

## 2024-03-11 PROCEDURE — 71271 CT THORAX LUNG CANCER SCR C-: CPT

## 2024-03-22 ENCOUNTER — TELEMEDICINE (OUTPATIENT)
Dept: BEHAVIORAL HEALTH | Facility: CLINIC | Age: 69
End: 2024-03-22
Payer: COMMERCIAL

## 2024-03-22 DIAGNOSIS — F33.42 RECURRENT MAJOR DEPRESSIVE DISORDER, IN FULL REMISSION (HCC): ICD-10-CM

## 2024-03-22 DIAGNOSIS — F41.1 GAD (GENERALIZED ANXIETY DISORDER): ICD-10-CM

## 2024-03-22 PROCEDURE — 99214 OFFICE O/P EST MOD 30 MIN: CPT | Mod: 95 | Performed by: PSYCHIATRY & NEUROLOGY

## 2024-03-22 RX ORDER — ESCITALOPRAM OXALATE 20 MG/1
20 TABLET ORAL DAILY
Qty: 90 TABLET | Refills: 3 | Status: SHIPPED | OUTPATIENT
Start: 2024-03-22

## 2024-03-22 RX ORDER — BUSPIRONE HYDROCHLORIDE 15 MG/1
15 TABLET ORAL 2 TIMES DAILY
Qty: 180 TABLET | Refills: 3 | Status: SHIPPED | OUTPATIENT
Start: 2024-03-22

## 2024-03-22 NOTE — PROGRESS NOTES
This evaluation was conducted via Zoom using secure and encrypted videoconferencing technology. The patient was in their home in the [facility].    The patient's identity was confirmed and verbal consent was obtained for this virtual visit.      PSYCHIATRY FOLLOW-UP NOTE      Name: Lizy Merlos  MRN: [id number]  : 1955  Age: 68 y.o.  Date of assessment: 3/22/2024  PCP: Norma Holguin M.D.  Persons in attendance: Patient      REASON FOR VISIT/CHIEF COMPLAINT (as stated by Patient):  Lizy Merlos is a 68 y.o., White female, attending follow-up appointment for mood and anxiety management.      HISTORY OF PRESENT ILLNESS:  Lizy Merlos is a 68 y.o. old female with MDD and VANI comes in today for follow up. Patient was last seen 3 months ago, and following treatment planning recommendations were done:  Continue Lexapro 20 mg daily for depression and anxiety management.    Add Buspirone 7.5 mg BID X 2 weeks --> 15 mg BID for anxiety management.    Motivated to consider psychotherapy for mood and anxiety management.   To continue follow-up with primary care physician for management of comorbid medical conditions.    Compliant with Lexapro 20 mg and Buspar 15 mg BID.  Reports this combination as helpful for anxiety.  Able to deal with changes effectively.  is the main stressor at this time.  Sleeping normal.  Emphasis given on self care.  Currently not in psychotherapy: motivated to consider.       CURRENT MEDICATIONS:  Current Outpatient Medications   Medication Sig Dispense Refill    progesterone (PROMETRIUM) 100 MG Cap TAKE 1 CAPSULE DAILY 90 Capsule 1    escitalopram (LEXAPRO) 20 MG tablet Take 1 Tablet by mouth every day. 90 Tablet 3    busPIRone (BUSPAR) 15 MG tablet Take 1 Tablet by mouth 2 times a day. 180 Tablet 1    tizanidine (ZANAFLEX) 4 MG Tab Take 1 Tablet by mouth at bedtime as needed (muscle spasm). 30 Tablet 2    meloxicam (MOBIC) 15 MG tablet Take 1 Tablet by mouth  every day. 30 Tablet 0    estradiol (ESTRACE) 0.5 MG tablet Take 1 Tablet by mouth every 48 hours. 90 Tablet 1    Digestive Enzymes (DIGESTIVE ENZYME PO) Take  by mouth.      magnesium citrate Solution Take 300 mL by mouth one time.      simvastatin (ZOCOR) 40 MG Tab TAKE 1 TABLET EVERY EVENING 90 Tablet 3     No current facility-administered medications for this visit.       MEDICAL HISTORY  Past Medical History:   Diagnosis Date    Alcohol abuse     Anxiety disorder 09/2019    Arthritis 09/2019    hips, spine, hands    Depression 09/2019    High cholesterol     Liver disease     Restless leg syndrome     Sleep apnea     cpap    Snoring     Urinary incontinence      Past Surgical History:   Procedure Laterality Date    PB REPAIR ACHILLES TENDON,SECONDARY Right 4/20/2023    Procedure: RIGHT ANKLE ACHILLES DEBRIDEMENT AND REPAIR;  Surgeon: Jake Jimenez M.D.;  Location: [facility];  Service: Orthopedics    PB XFER SINGLE DEEP LOW LEG TENDON Right 4/20/2023    Procedure: RIGHT REVISION FLEXOR HALLUCIS LONGUS TRANSFER;  Surgeon: Jake Jimenez M.D.;  Location: [facility];  Service: Orthopedics    PB REMOVAL OF HEEL BONE Right 4/20/2023    Procedure: RIGHT CALCANEAL SPUR EXCISION;  Surgeon: Jake Jimenez M.D.;  Location: [facility];  Service: Orthopedics    PB RELEASE TIB/FIB/ANKLE FLEX TENDON,EA Left 4/20/2023    Procedure: RIGHT PERONEAL TENOLYSIS;  Surgeon: Jake Jimenez M.D.;  Location: [facility];  Service: Orthopedics    PB REPAIR COLLAT ANKLE LIGMNT,SECONDARY Right 4/20/2023    Procedure: RIGHT LATERAL LIGAMENT REPAIR;  Surgeon: Jake Jimenez M.D.;  Location: [facility];  Service: Orthopedics    PB RUDOLPH W/O FACETEC FORAMOT/DSKC 1/2 VRT SEG, CE*  12/18/2020    Procedure: LAMINECTOMY, SPINE, CERVICAL, POSTERIOR APPROACH-STAGE#3 C6-T1 LAMI AND C5-7 FACETECTOMY;  Surgeon: Lavell Rincon M.D.;   Location: [facility];  Service: Neurosurgery    CERVICAL DISK AND FUSION ANTERIOR  12/18/2020    Procedure: DISCECTOMY, SPINE, CERVICAL, ANTERIOR APPROACH, WITH FUSION-STAGE #1 C3-5 DISCECTOMY WITH RECURRENT LARYNGEAL NERVE MONITORING SSEPS;  Surgeon: Lavell Rincon M.D.;  Location: [facility];  Service: Neurosurgery    CORPECTOMY  12/18/2020    Procedure: CORPECTOMY, SPINE-ANTERIOR C4;  Surgeon: Lavell Rincon M.D.;  Location: [facility];  Service: Neurosurgery    POSTERIOR CERVICAL FUSION O-ARM  12/18/2020    Procedure: FUSION, SPINE, CERVICAL, POSTERIOR APPROACH, WITH O-ARM IMAGING GUIDANCE-STAGE #2 C3-C6, C7-T3 LATERAL FUSION;  Surgeon: Lavell Rincon M.D.;  Location: [facility];  Service: Neurosurgery    NECK EXPLORATION Right 12/18/2020    Procedure: EXPLORATION, NECK AND CERVICAL SPINE EXPOSURE;  Surgeon: Lavell Rincon M.D.;  Location: [facility];  Service: Neurosurgery    PB SHLDR ARTHROSCOP,SURG,W/ROTAT CUFF REPB Left 10/1/2019    Procedure: ARTHROSCOPY, SHOULDER, WITH ROTATOR CUFF REPAIR;  Surgeon: Lavell Hooks M.D.;  Location: [facility];  Service: Orthopedics    WI SHLDR ARTHROSCOP,PART ACROMIOPLAS Left 10/1/2019    Procedure: DECOMPRESSION, SHOULDER, ARTHROSCOPIC - SUBACROMIAL W/LABRAL DEBRIDEMENT;  Surgeon: Lavell Hooks M.D.;  Location: [facility];  Service: Orthopedics    PB ARTHROSCOPY SHOULDER SURGICAL BICEPS TENODES* Left 10/1/2019    Procedure: ARTHROSCOPY, SHOULDER, WITH BICEPS TENODESIS - W/OPEN SUBPECTORAL TENODESIS, PROCEED AS INDICATED;  Surgeon: Lavell Hooks M.D.;  Location: [facility];  Service: Orthopedics    CERVICAL DISK AND FUSION ANTERIOR  1993 and 1994    PRIMARY C SECTION      twice     SHOULDER SURGERY Left     Rotator cuff    SINUSCOPE      TONSILLECTOMY         PAST PSYCHIATRIC MEDICATIONS  Lexapro  Mirtazapine-increased food craving  Gabapentin  Do not  remember names of other medication trials        REVIEW OF SYSTEMS:        Constitutional negative   Eyes negative   Ears/Nose/Mouth/Throat negative   Cardiovascular negative   Respiratory negative   Gastrointestinal negative   Genitourinary negative   Muscular  chronic back pain   Integumentary negative   Neurological negative   Endocrine negative   Hematologic/Lymphatic negative     PHYSICAL EXAMINAION:  Vital signs: LMP  (LMP Unknown)   Musculoskeletal: Normal gait.   Abnormal movements: none      MENTAL STATUS EXAMINATION      General:   - Grooming and hygiene: Casual,   - Apparent distress: none,   - Behavior: Calm  - Eye Contact:  Good,   - no psychomotor agitation or retardation    - Participation: Active verbal participation  Orientation: Alert and Fully Oriented to person, place and time  Mood: Euthymic  Affect: Flexible and Full range,  Thought Process: Logical and Goal-directed  Thought Content: Denies suicidal or homicidal ideations, intent or plan   Perception: Denies auditory or visual hallucinations. No delusions noted   Attention span and concentration: Intact   Speech:Rate within normal limits and Volume within normal limits  Language: Appropriate   Insight: Good  Judgment: Good  Recent and remote memory: No gross evidence of memory deficits        DEPRESSION SCREENIN/6/2022     1:00 PM 4/3/2023     7:40 AM 2024    11:00 AM   Depression Screen (PHQ-2/PHQ-9)   PHQ-2 Total Score 0 0 1   PHQ-9 Total Score   8       Interpretation of PHQ-9 Total Score   Score Severity   1-4 No Depression   5-9 Mild Depression   10-14 Moderate Depression   15-19 Moderately Severe Depression   20-27 Severe Depression    CURRENT RISK:       Suicidal: Low       Homicidal: Low       Self-Harm: Low       Relapse: Low       Crisis Safety Plan Reviewed Not Indicated       If evidence of imminent risk is present, intervention/plan:      MEDICAL RECORDS/LABS/DIAGNOSTIC TESTS REVIEWED:  No new lab since last visit      [place]  records -   Reviewed     PLAN:  (1) Major depressive disorder; (2) Generalized Anxiety Disorder; (3) r/o Mood disorder secondary to medical conditions.  Improving  Continue Lexapro 20 mg daily for depression and anxiety management.    Continue Buspirone 15 mg BID for anxiety management.    Motivated to consider psychotherapy for mood and anxiety management.   To continue follow-up with primary care physician for management of comorbid medical conditions.  Medication options, alternatives (including no medications) and medication risks/benefits/side effects were discussed in detail.  Explained importance of contraceptive measures while on psychotropic medications, educated to let provider know if ever pregnant or wanting to become pregnant. Verbalized understanding.  The patient was advised to call, message provider on [employer], or come in to the clinic if symptoms worsen or if any future questions/issues regarding their medications arise; the patient verbalized understanding and agreement.    The patient was educated to call 911, call the suicide hotline, or go to local ER if having thoughts of suicide or homicide; verbalized understanding.    Billing Coding based on:  45424 based on MDM    Return to clinic in 3 months or sooner if symptoms worsen.  Next Appointment: instruction provided on how to make the next appointment.     The proposed treatment plan was discussed with the patient who was provided the opportunity to ask questions and make suggestions regarding alternative treatment. Patient verbalized understanding and expressed agreement with the plan.       Darian Pathak M.D.  03/22/24    This note was created using voice recognition software (Dragon). The accuracy of the dictation is limited by the abilities of the software. I have reviewed the note prior to signing, however some errors in grammar and context are still possible. If you have any questions related to this note please do not  hesitate to contact our office.

## 2024-03-23 DIAGNOSIS — G89.29 ACUTE EXACERBATION OF CHRONIC LOW BACK PAIN: ICD-10-CM

## 2024-03-23 DIAGNOSIS — M54.50 ACUTE EXACERBATION OF CHRONIC LOW BACK PAIN: ICD-10-CM

## 2024-03-26 DIAGNOSIS — M54.50 ACUTE EXACERBATION OF CHRONIC LOW BACK PAIN: ICD-10-CM

## 2024-03-26 DIAGNOSIS — G89.29 ACUTE EXACERBATION OF CHRONIC LOW BACK PAIN: ICD-10-CM

## 2024-03-29 RX ORDER — TIZANIDINE 4 MG/1
TABLET ORAL
Qty: 30 TABLET | Refills: 0 | Status: SHIPPED | OUTPATIENT
Start: 2024-03-29

## 2024-03-30 RX ORDER — TIZANIDINE 4 MG/1
4 TABLET ORAL NIGHTLY PRN
Qty: 30 TABLET | Refills: 2 | OUTPATIENT
Start: 2024-03-30

## 2024-05-16 DIAGNOSIS — E78.00 HYPERCHOLESTEROLEMIA: ICD-10-CM

## 2024-05-20 RX ORDER — SIMVASTATIN 40 MG
TABLET ORAL
Qty: 90 TABLET | Refills: 0 | Status: SHIPPED | OUTPATIENT
Start: 2024-05-20

## 2024-07-12 ENCOUNTER — TELEMEDICINE (OUTPATIENT)
Dept: BEHAVIORAL HEALTH | Facility: CLINIC | Age: 69
End: 2024-07-12
Payer: COMMERCIAL

## 2024-07-12 DIAGNOSIS — F41.1 GAD (GENERALIZED ANXIETY DISORDER): ICD-10-CM

## 2024-07-12 DIAGNOSIS — F33.42 RECURRENT MAJOR DEPRESSIVE DISORDER, IN FULL REMISSION (HCC): ICD-10-CM

## 2024-07-12 PROCEDURE — 99214 OFFICE O/P EST MOD 30 MIN: CPT | Performed by: PSYCHIATRY & NEUROLOGY

## 2024-07-12 RX ORDER — BUSPIRONE HYDROCHLORIDE 15 MG/1
15 TABLET ORAL 3 TIMES DAILY
Qty: 270 TABLET | Refills: 1 | Status: SHIPPED | OUTPATIENT
Start: 2024-07-12 | End: 2024-10-10

## 2024-07-12 RX ORDER — ESCITALOPRAM OXALATE 20 MG/1
20 TABLET ORAL DAILY
Qty: 90 TABLET | Refills: 3 | Status: SHIPPED | OUTPATIENT
Start: 2024-07-12

## 2024-07-17 ENCOUNTER — HOSPITAL ENCOUNTER (OUTPATIENT)
Dept: LAB | Facility: MEDICAL CENTER | Age: 69
End: 2024-07-17
Attending: FAMILY MEDICINE
Payer: COMMERCIAL

## 2024-07-17 DIAGNOSIS — E78.00 HYPERCHOLESTEROLEMIA: ICD-10-CM

## 2024-07-17 LAB
ALBUMIN SERPL BCP-MCNC: 4.3 G/DL (ref 3.2–4.9)
ALBUMIN/GLOB SERPL: 1.8 G/DL
ALP SERPL-CCNC: 87 U/L (ref 30–99)
ALT SERPL-CCNC: 30 U/L (ref 2–50)
ANION GAP SERPL CALC-SCNC: 12 MMOL/L (ref 7–16)
AST SERPL-CCNC: 23 U/L (ref 12–45)
BASOPHILS # BLD AUTO: 1.2 % (ref 0–1.8)
BASOPHILS # BLD: 0.08 K/UL (ref 0–0.12)
BILIRUB SERPL-MCNC: 0.7 MG/DL (ref 0.1–1.5)
BUN SERPL-MCNC: 13 MG/DL (ref 8–22)
CALCIUM ALBUM COR SERPL-MCNC: 9.3 MG/DL (ref 8.5–10.5)
CALCIUM SERPL-MCNC: 9.5 MG/DL (ref 8.5–10.5)
CHLORIDE SERPL-SCNC: 106 MMOL/L (ref 96–112)
CHOLEST SERPL-MCNC: 168 MG/DL (ref 100–199)
CO2 SERPL-SCNC: 23 MMOL/L (ref 20–33)
CREAT SERPL-MCNC: 0.82 MG/DL (ref 0.5–1.4)
EOSINOPHIL # BLD AUTO: 0.14 K/UL (ref 0–0.51)
EOSINOPHIL NFR BLD: 2.2 % (ref 0–6.9)
ERYTHROCYTE [DISTWIDTH] IN BLOOD BY AUTOMATED COUNT: 46.1 FL (ref 35.9–50)
GFR SERPLBLD CREATININE-BSD FMLA CKD-EPI: 77 ML/MIN/1.73 M 2
GLOBULIN SER CALC-MCNC: 2.4 G/DL (ref 1.9–3.5)
GLUCOSE SERPL-MCNC: 110 MG/DL (ref 65–99)
HCT VFR BLD AUTO: 41.7 % (ref 37–47)
HDLC SERPL-MCNC: 52 MG/DL
HGB BLD-MCNC: 14.1 G/DL (ref 12–16)
IMM GRANULOCYTES # BLD AUTO: 0.04 K/UL (ref 0–0.11)
IMM GRANULOCYTES NFR BLD AUTO: 0.6 % (ref 0–0.9)
LDLC SERPL CALC-MCNC: 83 MG/DL
LYMPHOCYTES # BLD AUTO: 1.27 K/UL (ref 1–4.8)
LYMPHOCYTES NFR BLD: 19.7 % (ref 22–41)
MCH RBC QN AUTO: 32.3 PG (ref 27–33)
MCHC RBC AUTO-ENTMCNC: 33.8 G/DL (ref 32.2–35.5)
MCV RBC AUTO: 95.6 FL (ref 81.4–97.8)
MONOCYTES # BLD AUTO: 0.55 K/UL (ref 0–0.85)
MONOCYTES NFR BLD AUTO: 8.5 % (ref 0–13.4)
NEUTROPHILS # BLD AUTO: 4.36 K/UL (ref 1.82–7.42)
NEUTROPHILS NFR BLD: 67.8 % (ref 44–72)
NRBC # BLD AUTO: 0 K/UL
NRBC BLD-RTO: 0 /100 WBC (ref 0–0.2)
PLATELET # BLD AUTO: 321 K/UL (ref 164–446)
PMV BLD AUTO: 9.2 FL (ref 9–12.9)
POTASSIUM SERPL-SCNC: 4.2 MMOL/L (ref 3.6–5.5)
PROT SERPL-MCNC: 6.7 G/DL (ref 6–8.2)
RBC # BLD AUTO: 4.36 M/UL (ref 4.2–5.4)
SODIUM SERPL-SCNC: 141 MMOL/L (ref 135–145)
TRIGL SERPL-MCNC: 167 MG/DL (ref 0–149)
WBC # BLD AUTO: 6.4 K/UL (ref 4.8–10.8)

## 2024-07-17 PROCEDURE — 85025 COMPLETE CBC W/AUTO DIFF WBC: CPT

## 2024-07-17 PROCEDURE — 80061 LIPID PANEL: CPT

## 2024-07-17 PROCEDURE — 36415 COLL VENOUS BLD VENIPUNCTURE: CPT

## 2024-07-17 PROCEDURE — 80053 COMPREHEN METABOLIC PANEL: CPT

## 2024-08-04 DIAGNOSIS — Z79.890 POSTMENOPAUSAL HRT (HORMONE REPLACEMENT THERAPY): ICD-10-CM

## 2024-08-04 DIAGNOSIS — N95.1 VASOMOTOR SYMPTOMS DUE TO MENOPAUSE: ICD-10-CM

## 2024-08-08 RX ORDER — ESTRADIOL 0.5 MG/1
0.5 TABLET ORAL
Qty: 45 TABLET | Refills: 0 | Status: SHIPPED | OUTPATIENT
Start: 2024-08-08

## 2024-08-08 RX ORDER — PROGESTERONE 100 MG/1
100 CAPSULE ORAL DAILY
Qty: 90 CAPSULE | Refills: 0 | Status: SHIPPED | OUTPATIENT
Start: 2024-08-08

## 2024-08-18 DIAGNOSIS — E78.00 HYPERCHOLESTEROLEMIA: ICD-10-CM

## 2024-08-19 RX ORDER — SIMVASTATIN 40 MG
TABLET ORAL
Qty: 90 TABLET | Refills: 0 | Status: SHIPPED | OUTPATIENT
Start: 2024-08-19

## 2024-09-02 SDOH — HEALTH STABILITY: MENTAL HEALTH
STRESS IS WHEN SOMEONE FEELS TENSE, NERVOUS, ANXIOUS, OR CAN'T SLEEP AT NIGHT BECAUSE THEIR MIND IS TROUBLED. HOW STRESSED ARE YOU?: TO SOME EXTENT

## 2024-09-02 SDOH — HEALTH STABILITY: PHYSICAL HEALTH: ON AVERAGE, HOW MANY DAYS PER WEEK DO YOU ENGAGE IN MODERATE TO STRENUOUS EXERCISE (LIKE A BRISK WALK)?: 5 DAYS

## 2024-09-02 SDOH — ECONOMIC STABILITY: FOOD INSECURITY: WITHIN THE PAST 12 MONTHS, THE FOOD YOU BOUGHT JUST DIDN'T LAST AND YOU DIDN'T HAVE MONEY TO GET MORE.: NEVER TRUE

## 2024-09-02 SDOH — ECONOMIC STABILITY: TRANSPORTATION INSECURITY
IN THE PAST 12 MONTHS, HAS LACK OF TRANSPORTATION KEPT YOU FROM MEETINGS, WORK, OR FROM GETTING THINGS NEEDED FOR DAILY LIVING?: NO

## 2024-09-02 SDOH — ECONOMIC STABILITY: HOUSING INSECURITY
IN THE LAST 12 MONTHS, WAS THERE A TIME WHEN YOU DID NOT HAVE A STEADY PLACE TO SLEEP OR SLEPT IN A SHELTER (INCLUDING NOW)?: NO

## 2024-09-02 SDOH — ECONOMIC STABILITY: INCOME INSECURITY: HOW HARD IS IT FOR YOU TO PAY FOR THE VERY BASICS LIKE FOOD, HOUSING, MEDICAL CARE, AND HEATING?: NOT VERY HARD

## 2024-09-02 SDOH — ECONOMIC STABILITY: INCOME INSECURITY: IN THE LAST 12 MONTHS, WAS THERE A TIME WHEN YOU WERE NOT ABLE TO PAY THE MORTGAGE OR RENT ON TIME?: NO

## 2024-09-02 SDOH — HEALTH STABILITY: PHYSICAL HEALTH: ON AVERAGE, HOW MANY MINUTES DO YOU ENGAGE IN EXERCISE AT THIS LEVEL?: 30 MIN

## 2024-09-02 SDOH — ECONOMIC STABILITY: TRANSPORTATION INSECURITY
IN THE PAST 12 MONTHS, HAS LACK OF RELIABLE TRANSPORTATION KEPT YOU FROM MEDICAL APPOINTMENTS, MEETINGS, WORK OR FROM GETTING THINGS NEEDED FOR DAILY LIVING?: NO

## 2024-09-02 SDOH — ECONOMIC STABILITY: TRANSPORTATION INSECURITY
IN THE PAST 12 MONTHS, HAS THE LACK OF TRANSPORTATION KEPT YOU FROM MEDICAL APPOINTMENTS OR FROM GETTING MEDICATIONS?: NO

## 2024-09-02 SDOH — ECONOMIC STABILITY: FOOD INSECURITY: WITHIN THE PAST 12 MONTHS, YOU WORRIED THAT YOUR FOOD WOULD RUN OUT BEFORE YOU GOT MONEY TO BUY MORE.: NEVER TRUE

## 2024-09-02 ASSESSMENT — SOCIAL DETERMINANTS OF HEALTH (SDOH)
HOW OFTEN DO YOU ATTENT MEETINGS OF THE CLUB OR ORGANIZATION YOU BELONG TO?: NEVER
HOW OFTEN DO YOU ATTEND CHURCH OR RELIGIOUS SERVICES?: NEVER
HOW OFTEN DO YOU ATTEND CHURCH OR RELIGIOUS SERVICES?: NEVER
HOW MANY DRINKS CONTAINING ALCOHOL DO YOU HAVE ON A TYPICAL DAY WHEN YOU ARE DRINKING: 3 OR 4
HOW OFTEN DO YOU HAVE A DRINK CONTAINING ALCOHOL: 4 OR MORE TIMES A WEEK
IN THE PAST 12 MONTHS, HAS THE ELECTRIC, GAS, OIL, OR WATER COMPANY THREATENED TO SHUT OFF SERVICE IN YOUR HOME?: NO
HOW OFTEN DO YOU HAVE SIX OR MORE DRINKS ON ONE OCCASION: LESS THAN MONTHLY
IN A TYPICAL WEEK, HOW MANY TIMES DO YOU TALK ON THE PHONE WITH FAMILY, FRIENDS, OR NEIGHBORS?: NEVER
HOW OFTEN DO YOU GET TOGETHER WITH FRIENDS OR RELATIVES?: NEVER
DO YOU BELONG TO ANY CLUBS OR ORGANIZATIONS SUCH AS CHURCH GROUPS UNIONS, FRATERNAL OR ATHLETIC GROUPS, OR SCHOOL GROUPS?: NO
IN A TYPICAL WEEK, HOW MANY TIMES DO YOU TALK ON THE PHONE WITH FAMILY, FRIENDS, OR NEIGHBORS?: NEVER
HOW OFTEN DO YOU ATTENT MEETINGS OF THE CLUB OR ORGANIZATION YOU BELONG TO?: NEVER
HOW HARD IS IT FOR YOU TO PAY FOR THE VERY BASICS LIKE FOOD, HOUSING, MEDICAL CARE, AND HEATING?: NOT VERY HARD
HOW OFTEN DO YOU GET TOGETHER WITH FRIENDS OR RELATIVES?: NEVER
DO YOU BELONG TO ANY CLUBS OR ORGANIZATIONS SUCH AS CHURCH GROUPS UNIONS, FRATERNAL OR ATHLETIC GROUPS, OR SCHOOL GROUPS?: NO
WITHIN THE PAST 12 MONTHS, YOU WORRIED THAT YOUR FOOD WOULD RUN OUT BEFORE YOU GOT THE MONEY TO BUY MORE: NEVER TRUE

## 2024-09-02 ASSESSMENT — LIFESTYLE VARIABLES
HOW OFTEN DO YOU HAVE A DRINK CONTAINING ALCOHOL: 4 OR MORE TIMES A WEEK
AUDIT-C TOTAL SCORE: 6
SKIP TO QUESTIONS 9-10: 0
HOW OFTEN DO YOU HAVE SIX OR MORE DRINKS ON ONE OCCASION: LESS THAN MONTHLY
HOW MANY STANDARD DRINKS CONTAINING ALCOHOL DO YOU HAVE ON A TYPICAL DAY: 3 OR 4

## 2024-09-05 ENCOUNTER — APPOINTMENT (OUTPATIENT)
Dept: MEDICAL GROUP | Facility: MEDICAL CENTER | Age: 69
End: 2024-09-05
Payer: COMMERCIAL

## 2024-09-05 VITALS
HEART RATE: 62 BPM | BODY MASS INDEX: 29.51 KG/M2 | TEMPERATURE: 97.7 F | DIASTOLIC BLOOD PRESSURE: 72 MMHG | WEIGHT: 166.56 LBS | OXYGEN SATURATION: 97 % | SYSTOLIC BLOOD PRESSURE: 118 MMHG | HEIGHT: 63 IN

## 2024-09-05 DIAGNOSIS — F41.1 GAD (GENERALIZED ANXIETY DISORDER): ICD-10-CM

## 2024-09-05 DIAGNOSIS — M54.50 ACUTE EXACERBATION OF CHRONIC LOW BACK PAIN: ICD-10-CM

## 2024-09-05 DIAGNOSIS — K21.9 GASTROESOPHAGEAL REFLUX DISEASE WITHOUT ESOPHAGITIS: ICD-10-CM

## 2024-09-05 DIAGNOSIS — E78.00 HYPERCHOLESTEROLEMIA: ICD-10-CM

## 2024-09-05 DIAGNOSIS — Z12.31 ENCOUNTER FOR SCREENING MAMMOGRAM FOR BREAST CANCER: ICD-10-CM

## 2024-09-05 DIAGNOSIS — M54.12 CERVICAL RADICULOPATHY: ICD-10-CM

## 2024-09-05 DIAGNOSIS — G89.29 CHRONIC BILATERAL LOW BACK PAIN WITH BILATERAL SCIATICA: ICD-10-CM

## 2024-09-05 DIAGNOSIS — G47.33 OSA ON CPAP: ICD-10-CM

## 2024-09-05 DIAGNOSIS — M54.41 CHRONIC BILATERAL LOW BACK PAIN WITH BILATERAL SCIATICA: ICD-10-CM

## 2024-09-05 DIAGNOSIS — M54.42 CHRONIC BILATERAL LOW BACK PAIN WITH BILATERAL SCIATICA: ICD-10-CM

## 2024-09-05 DIAGNOSIS — Z79.890 POSTMENOPAUSAL HRT (HORMONE REPLACEMENT THERAPY): ICD-10-CM

## 2024-09-05 DIAGNOSIS — G89.29 ACUTE EXACERBATION OF CHRONIC LOW BACK PAIN: ICD-10-CM

## 2024-09-05 DIAGNOSIS — J44.9 CHRONIC OBSTRUCTIVE PULMONARY DISEASE, UNSPECIFIED COPD TYPE (HCC): ICD-10-CM

## 2024-09-05 DIAGNOSIS — Z78.9 DAILY CONSUMPTION OF ALCOHOL: ICD-10-CM

## 2024-09-05 DIAGNOSIS — Z87.891 FORMER SMOKER: ICD-10-CM

## 2024-09-05 DIAGNOSIS — R91.8 LUNG NODULES: ICD-10-CM

## 2024-09-05 DIAGNOSIS — Z63.0 MARITAL PROBLEM: ICD-10-CM

## 2024-09-05 PROBLEM — E66.9 OBESITY (BMI 30-39.9): Status: RESOLVED | Noted: 2022-11-29 | Resolved: 2024-09-05

## 2024-09-05 PROBLEM — M76.61 ACHILLES TENDINITIS, RIGHT LEG: Status: RESOLVED | Noted: 2023-03-22 | Resolved: 2024-09-05

## 2024-09-05 PROCEDURE — 3078F DIAST BP <80 MM HG: CPT | Performed by: FAMILY MEDICINE

## 2024-09-05 PROCEDURE — 99215 OFFICE O/P EST HI 40 MIN: CPT | Performed by: FAMILY MEDICINE

## 2024-09-05 PROCEDURE — 3074F SYST BP LT 130 MM HG: CPT | Performed by: FAMILY MEDICINE

## 2024-09-05 RX ORDER — ALBUTEROL SULFATE 90 UG/1
2 INHALANT RESPIRATORY (INHALATION) EVERY 6 HOURS PRN
Qty: 8.5 G | Refills: 5 | Status: SHIPPED | OUTPATIENT
Start: 2024-09-05

## 2024-09-05 RX ORDER — SIMVASTATIN 40 MG
40 TABLET ORAL NIGHTLY
Qty: 90 TABLET | Refills: 3 | Status: SHIPPED | OUTPATIENT
Start: 2024-09-05

## 2024-09-05 SDOH — SOCIAL STABILITY - SOCIAL INSECURITY: PROBLEMS IN RELATIONSHIP WITH SPOUSE OR PARTNER: Z63.0

## 2024-09-05 ASSESSMENT — FIBROSIS 4 INDEX: FIB4 SCORE: 0.9

## 2024-09-06 NOTE — PROGRESS NOTES
Verbal consent was acquired by the patient to use [employer] ambient listening note generation during this visit: YES    CC: marital stress     Assessment & Plan:     1. VANI (generalized anxiety disorder)  2. Marital problem  Chronic, controlled with Lexapro and Buspar managed by Psych , no s/e reported. Patient complains of worsening mental health due to marital problems. She wishes to work with .   - Referral to Behavioral Health  - continue Lexapro and Buspar   - regular exercises as tolerated.   - follow up in 6 months     3. Postmenopausal HRT (hormone replacement therapy)  Patient is on HRT for vasomotor symptoms of menopause.   She is doing well. She wishes to wean off medications at this time.   - discontinue Estradiol and Prometrium   - continue to monitor symptoms and follow up was needed.     4. Acute exacerbation of chronic low back pain  5. Chronic bilateral low back pain with bilateral sciatica  6. Cervical radiculopathy_s/p decompression and fusion by Dr Rincon in 12/2020  Patient complains of acute on chronic lower back pain. Patient is scheduled to follow up with her neurosurgeon to discuss treatment. Denies lower extremities weakness or bowel/bladder symptoms.   - weight loss  - regular exercises as tolerated.     7. Hypercholesterolemia  Chronic, controlled with Simvastatin 40 mg qd, no s/e reported, will continue.    - simvastatin (ZOCOR) 40 MG Tab; Take 1 Tablet by mouth every evening.  Dispense: 90 Tablet; Refill: 3  - dietary modification, regular exercise  - weight loss   - avoid alcohol, illicit drugs, tobacco products     8. Gastroesophageal reflux disease without esophagitis  Chronic, used to take Omeprazole 20 mg qd. However, she discontinued due to the lack of symptoms. Given history of chronic severe alcoholism, and history of prior gastric ulcers, I recommend that she restarts Omeprazole.   - omeprazole (PRILOSEC) 20 MG delayed-release capsule; Take 1 Capsule by mouth every morning.   Dispense: 90 Capsule; Refill: 3    9. Daily consumption of alcohol  Patient consumes 4-5 servings of wine per day. Her  reported to me that she drinks much more. She has been drinking alcohol daily for years. She denies any social or legal problems with alcohol so far. I had long discussion with patient regarding chronic alcoholism and associated health risk. Recommended gradually wean off and cessation. Will also refer patient to  for alcohol cessation counseling and VANI management. Patient will continue to follow up with her psychiatrist for management of VANI. She was asked to restart PPI due to chronic heavy alcoholism and history of ulcer.   - omeprazole (PRILOSEC) 20 MG delayed-release capsule; Take 1 Capsule by mouth every morning.  Dispense: 90 Capsule; Refill: 3    10. PRAVEEN on CPAP  - recommended weight loss and consistent use of CPAP  - alcohol cessation, avoid sedatives.     11. Former smoker  12. Lung nodules_repeat LDCT 1/2024  13. Chronic obstructive pulmonary disease, unspecified COPD type (HCC)  History of tobacco abuse. She has SOB infrequently controlled with Albuterol. She has annual LDCT for lung cancer screening. Last one was in 1/2024 which showed stable lung nodules. Will repeat LDCT in 1/2025.   - albuterol 108 (90 Base) MCG/ACT Aero Soln inhalation aerosol; Inhale 2 Puffs every 6 hours as needed for Shortness of Breath.  Dispense: 8.5 g; Refill: 5    14. Encounter for screening mammogram for breast cancer  - MA-SCREENING MAMMO BILAT W/TOMOSYNTHESIS W/CAD; Future     Subjective:       HPI:   History of Present Illness  The patient is a 69-year-old female who presents for evaluation of multiple medical concerns.    She has been on hormone replacement therapy with Prometrium and estradiol for the past 2 years and is considering discontinuing it.     She continues to take simvastatin nightly. No side effects erported.     Her last mammogram was in April 2023, and she underwent genetic  testing in 2022, which yielded negative BRCA1/2 mutations.    She has a history of alcoholism, having quit for 6 years before resuming consumption. Currently, she consumes approximately 4 glasses of wine per night. Her  reports to me that she drinks all day long. She reports that she drinks alcohol to control her anxiety as it is difficult to live with her . They have marital problems and they lead to significant anxiety and distress for her. She agrees to couple counseling and counseling for alcohol cessation.     She uses a CPAP machine for severe sleep apnea.     She has two stable nodules in her lungs and experiences shortness of breath due to a previous vocal cord operation. She has undergone lung function tests for COPD and has used an inhaler in the past.     She has been attempting to lose weight and has experienced some success.    She has a lifelong history of severe anxiety disorder, diagnosed by Dr. Pathak. She has been under psychiatric care since 1986 and has also seen therapists. She has attended group therapy sessions and has previously undergone rehabilitation for alcoholism. She has expressed interest in marital counseling and group therapy. Her mood is good. She takes Lexapro and BuSpar.    She has a history of acid reflux and was treated with omeprazole but discontinued it due to experiencing acid reflux upon cessation. She had an ulcer at age 16.    SOCIAL HISTORY  She quit smoking.    FAMILY HISTORY  Her mother had breast cancer.      Current Outpatient Medications:     simvastatin (ZOCOR) 40 MG Tab, Take 1 Tablet by mouth every evening., Disp: 90 Tablet, Rfl: 3    omeprazole (PRILOSEC) 20 MG delayed-release capsule, Take 1 Capsule by mouth every morning., Disp: 90 Capsule, Rfl: 3    albuterol 108 (90 Base) MCG/ACT Aero Soln inhalation aerosol, Inhale 2 Puffs every 6 hours as needed for Shortness of Breath., Disp: 8.5 g, Rfl: 5    escitalopram (LEXAPRO) 20 MG tablet, Take 1 Tablet  "by mouth every day., Disp: 90 Tablet, Rfl: 3    busPIRone (BUSPAR) 15 MG tablet, Take 1 Tablet by mouth 3 times a day for 90 days., Disp: 270 Tablet, Rfl: 1     Objective:     Exam:  /72 (BP Location: Right arm, Patient Position: Sitting, BP Cuff Size: Adult)   Pulse 62   Temp 36.5 °C (97.7 °F) (Temporal)   Ht 1.6 m (5' 3\")   Wt 75.5 kg (166 lb 8.9 oz)   LMP  (LMP Unknown)   SpO2 97%   BMI 29.50 kg/m²  Body mass index is 29.5 kg/m².    Constitutional: awake, alert, in no distress.  Skin: Warm, dry, good turgor, no rashes, bruises, ulcers in visible areas.  Eye: conjunctiva clear, lids neg for edema or lesions.  ENMT: TM and auditory canals wnl. Oral and nasal mucosa wnl. Lips without lesions, good dentition, oropharynx clear.  Neck: Trachea midline, no masses, no thyromegaly. No cervical or supraclavicular lymphadenopathy  Respiratory: Unlabored respiratory effort, lungs clear to auscultation, no wheezes, no rales.  Cardiovascular: Normal S1, S2, no murmur, no pedal edema.  Psych: Oriented x3, affect and mood wnl, intact judgement and insight.     Total time spent reviewing pt's chart, labs, notes, imaging, and counseling/prescribing medications to patient before, during, and after the visit: 40 minutes.      Return in about 3 months (around 12/5/2024) for alcohol cessation f/u .          Please note that this dictation was created using voice recognition software. I have made every reasonable attempt to correct obvious errors, but I expect that there are errors of grammar and possibly content that I did not discover before finalizing the note.        "

## 2024-09-10 ENCOUNTER — APPOINTMENT (OUTPATIENT)
Dept: RADIOLOGY | Facility: MEDICAL CENTER | Age: 69
End: 2024-09-10
Attending: FAMILY MEDICINE
Payer: COMMERCIAL

## 2024-09-10 DIAGNOSIS — Z12.31 ENCOUNTER FOR SCREENING MAMMOGRAM FOR BREAST CANCER: ICD-10-CM

## 2024-09-10 PROCEDURE — 77067 SCR MAMMO BI INCL CAD: CPT

## 2024-11-05 RX ORDER — PROGESTERONE 100 MG/1
100 CAPSULE ORAL DAILY
Qty: 90 CAPSULE | Refills: 1 | Status: SHIPPED | OUTPATIENT
Start: 2024-11-05

## 2024-12-04 ENCOUNTER — TELEMEDICINE (OUTPATIENT)
Dept: BEHAVIORAL HEALTH | Facility: CLINIC | Age: 69
End: 2024-12-04
Payer: COMMERCIAL

## 2024-12-04 DIAGNOSIS — F33.42 RECURRENT MAJOR DEPRESSIVE DISORDER, IN FULL REMISSION (HCC): ICD-10-CM

## 2024-12-04 DIAGNOSIS — F41.1 GAD (GENERALIZED ANXIETY DISORDER): ICD-10-CM

## 2024-12-04 PROCEDURE — 99214 OFFICE O/P EST MOD 30 MIN: CPT | Performed by: PSYCHIATRY & NEUROLOGY

## 2024-12-04 RX ORDER — BUSPIRONE HYDROCHLORIDE 15 MG/1
15 TABLET ORAL 3 TIMES DAILY
Qty: 270 TABLET | Refills: 3 | Status: SHIPPED | OUTPATIENT
Start: 2024-12-04

## 2024-12-04 NOTE — PROGRESS NOTES
This evaluation was conducted via Teams using secure and encrypted videoconferencing technology. The patient was in their home in the [facility].    The patient's identity was confirmed and verbal consent was obtained for this virtual visit.      PSYCHIATRY FOLLOW-UP NOTE      Name: Lizy Merlos  MRN: [id number]  : 1955  Age: 69 y.o.  Date of assessment: 2024  PCP: Norma Holguin M.D.  Persons in attendance: Patient      REASON FOR VISIT/CHIEF COMPLAINT (as stated by Patient):  Lizy Merlos is a 69 y.o., White female, attending follow-up appointment for mood and anxiety management.      HISTORY OF PRESENT ILLNESS:  Lizy Merlos is a 69 y.o. old female with MDD and VANI comes in today for follow up. Patient was last seen >4 months ago, and following treatment planning recommendations were done:  Continue Lexapro 20 mg daily for depression and anxiety management.    Continue Buspirone 15 mg BID + Add 15 mg PRN for anxiety management.    Motivated to consider psychotherapy for mood and anxiety management.   To continue follow-up with primary care physician for management of comorbid medical conditions.    History of Present Illness    She reports an overall improvement in her condition. She has been taking buspirone 15 mg three times daily without experiencing any side effects. Additionally, she is on a daily dose of Lexapro.   She finds the combination of Lexapro and buspirone beneficial.   However, she continues to struggle with anger management despite these medications. She still experiences episodes of anger, which she tries to control. She attributes her PTSD to a history of bullying and teasing. She recalls an incident where she confronted a neighbor about his loud car, which led to a misunderstanding and subsequent conflict with her . She feels unsupported by her , which exacerbates her anger.   Patient agreed with plan of continuing current treatment and  initiating psychotherapy.    Agreed with future plan of increasing BuSpar if indicated for anxiety and anger management.          CURRENT MEDICATIONS:  Current Outpatient Medications   Medication Sig Dispense Refill    progesterone (PROMETRIUM) 100 MG Cap TAKE 1 CAPSULE DAILY 90 Capsule 1    simvastatin (ZOCOR) 40 MG Tab Take 1 Tablet by mouth every evening. 90 Tablet 3    omeprazole (PRILOSEC) 20 MG delayed-release capsule Take 1 Capsule by mouth every morning. 90 Capsule 3    albuterol 108 (90 Base) MCG/ACT Aero Soln inhalation aerosol Inhale 2 Puffs every 6 hours as needed for Shortness of Breath. 8.5 g 5    escitalopram (LEXAPRO) 20 MG tablet Take 1 Tablet by mouth every day. 90 Tablet 3     No current facility-administered medications for this visit.       MEDICAL HISTORY  Past Medical History:   Diagnosis Date    Alcohol abuse     Anxiety disorder 09/2019    Arthritis 09/2019    hips, spine, hands    Depression 09/2019    High cholesterol     Liver disease     Restless leg syndrome     Sleep apnea     cpap    Snoring     Urinary incontinence      Past Surgical History:   Procedure Laterality Date    PB REPAIR ACHILLES TENDON,SECONDARY Right 4/20/2023    Procedure: RIGHT ANKLE ACHILLES DEBRIDEMENT AND REPAIR;  Surgeon: Jake Jimenez M.D.;  Location: [facility];  Service: Orthopedics    PB XFER SINGLE DEEP LOW LEG TENDON Right 4/20/2023    Procedure: RIGHT REVISION FLEXOR HALLUCIS LONGUS TRANSFER;  Surgeon: Jake Jimenez M.D.;  Location: [facility];  Service: Orthopedics    PB REMOVAL OF HEEL BONE Right 4/20/2023    Procedure: RIGHT CALCANEAL SPUR EXCISION;  Surgeon: Jake Jimenez M.D.;  Location: [facility];  Service: Orthopedics    PB RELEASE TIB/FIB/ANKLE FLEX TENDON,EA Left 4/20/2023    Procedure: RIGHT PERONEAL TENOLYSIS;  Surgeon: Jake Jimenez M.D.;  Location: [facility];  Service: Orthopedics    PB REPAIR  COLLAT ANKLE LIGMNT,SECONDARY Right 4/20/2023    Procedure: RIGHT LATERAL LIGAMENT REPAIR;  Surgeon: Jake Jimenez M.D.;  Location: [facility];  Service: Orthopedics    PB RUDOLPH W/O FACETEC FORAMOT/DSKC 1/2 VRT SEG, CE*  12/18/2020    Procedure: LAMINECTOMY, SPINE, CERVICAL, POSTERIOR APPROACH-STAGE#3 C6-T1 LAMI AND C5-7 FACETECTOMY;  Surgeon: Lavell Rincon M.D.;  Location: [facility];  Service: Neurosurgery    CERVICAL DISK AND FUSION ANTERIOR  12/18/2020    Procedure: DISCECTOMY, SPINE, CERVICAL, ANTERIOR APPROACH, WITH FUSION-STAGE #1 C3-5 DISCECTOMY WITH RECURRENT LARYNGEAL NERVE MONITORING SSEPS;  Surgeon: Lavell Rincon M.D.;  Location: [facility];  Service: Neurosurgery    CORPECTOMY  12/18/2020    Procedure: CORPECTOMY, SPINE-ANTERIOR C4;  Surgeon: Lavell Rinocn M.D.;  Location: [facility];  Service: Neurosurgery    POSTERIOR CERVICAL FUSION O-ARM  12/18/2020    Procedure: FUSION, SPINE, CERVICAL, POSTERIOR APPROACH, WITH O-ARM IMAGING GUIDANCE-STAGE #2 C3-C6, C7-T3 LATERAL FUSION;  Surgeon: Lavell Rincon M.D.;  Location: [facility];  Service: Neurosurgery    NECK EXPLORATION Right 12/18/2020    Procedure: EXPLORATION, NECK AND CERVICAL SPINE EXPOSURE;  Surgeon: Lavell Rincon M.D.;  Location: [facility];  Service: Neurosurgery    PB SHLDR ARTHROSCOP,SURG,W/ROTAT CUFF REPB Left 10/1/2019    Procedure: ARTHROSCOPY, SHOULDER, WITH ROTATOR CUFF REPAIR;  Surgeon: Lavell Hooks M.D.;  Location: [facility];  Service: Orthopedics    LA SHLDR ARTHROSCOP,PART ACROMIOPLAS Left 10/1/2019    Procedure: DECOMPRESSION, SHOULDER, ARTHROSCOPIC - SUBACROMIAL W/LABRAL DEBRIDEMENT;  Surgeon: Lavell Hooks M.D.;  Location: [facility];  Service: Orthopedics    PB ARTHROSCOPY SHOULDER SURGICAL BICEPS TENODES* Left 10/1/2019    Procedure: ARTHROSCOPY, SHOULDER, WITH BICEPS TENODESIS - W/OPEN SUBPECTORAL  TENODESIS, PROCEED AS INDICATED;  Surgeon: Lavell Hooks M.D.;  Location: [facility];  Service: Orthopedics    CERVICAL DISK AND FUSION ANTERIOR   and     PRIMARY C SECTION      twice     SHOULDER SURGERY Left     Rotator cuff    SINUSCOPE      TONSILLECTOMY         PAST PSYCHIATRIC MEDICATIONS  Lexapro  Mirtazapine-increased food craving  Gabapentin  Do not remember names of other medication trials        REVIEW OF SYSTEMS:        Constitutional negative   Eyes negative   Ears/Nose/Mouth/Throat negative   Cardiovascular negative   Respiratory negative   Gastrointestinal negative   Genitourinary negative   Muscular  chronic back pain   Integumentary negative   Neurological negative   Endocrine negative   Hematologic/Lymphatic negative     PHYSICAL EXAMINAION:  Vital signs: LMP  (LMP Unknown)   Musculoskeletal: Normal gait.   Abnormal movements: none      MENTAL STATUS EXAMINATION      General:   - Grooming and hygiene: Casual,   - Apparent distress: none,   - Behavior: Tense  - Eye Contact:  Good,   - no psychomotor agitation or retardation    - Participation: Active verbal participation  Orientation: Alert and Fully Oriented to person, place and time  Mood: Anxious  Affect: Flexible and Full range,  Thought Process: Logical and Goal-directed  Thought Content: Denies suicidal or homicidal ideations, intent or plan   Perception: Denies auditory or visual hallucinations. No delusions noted   Attention span and concentration: Intact   Speech:Rate within normal limits and Volume within normal limits  Language: Appropriate   Insight: Good  Judgment: Good  Recent and remote memory: No gross evidence of memory deficits        DEPRESSION SCREENIN/6/2022     1:00 PM 4/3/2023     7:40 AM 2024    11:00 AM   Depression Screen (PHQ-2/PHQ-9)   PHQ-2 Total Score 0 0 1   PHQ-9 Total Score   8       Interpretation of PHQ-9 Total Score   Score Severity   1-4 No Depression   5-9 Mild  [FreeTextEntry1] : Discussed BW with pt in depth\par over the last few years GFR has has been low and BUN and Cr has been elevated.\par Pt will check levels  today non fasting.  She will increase her fluids as well\par Will likely need Nephrology consult as discussed with pt has her levels have been off for years \par follow up again in 3 months Depression   10-14 Moderate Depression   15-19 Moderately Severe Depression   20-27 Severe Depression    CURRENT RISK:       Suicidal: Low       Homicidal: Low       Self-Harm: Low       Relapse: Low       Crisis Safety Plan Reviewed Not Indicated       If evidence of imminent risk is present, intervention/plan:      MEDICAL RECORDS/LABS/DIAGNOSTIC TESTS REVIEWED:  No new lab since last visit     [place] [facility] records -   Reviewed     PLAN:  (1) MDD (2) VANI (3) r/o Mood disorder secondary to medical conditions.  Slow improvement  Continue Lexapro 20 mg daily for depression and anxiety management.    Continue Buspirone 15 mg TID for anxiety management.    Initiate psychotherapy  To continue follow-up with primary care physician for management of comorbid medical conditions.  Medication options, alternatives (including no medications) and medication risks/benefits/side effects were discussed in detail.  Explained importance of contraceptive measures while on psychotropic medications, educated to let provider know if ever pregnant or wanting to become pregnant. Verbalized understanding.  The patient was advised to call, message provider on MartManiahart, or come in to the clinic if symptoms worsen or if any future questions/issues regarding their medications arise; the patient verbalized understanding and agreement.    The patient was educated to call 911, call the suicide hotline, or go to local ER if having thoughts of suicide or homicide; verbalized understanding.      Billing Coding based on:  70370 based on MDM    Return to clinic in 1 month or sooner if symptoms worsen.  Next Appointment: instruction provided on how to make the next appointment.     The proposed treatment plan was discussed with the patient who was provided the opportunity to ask questions and make suggestions regarding alternative treatment. Patient verbalized understanding and expressed agreement with the plan.       Darian Pathak M.D.  12/04/24    This  note was created using voice recognition software (Dragon). The accuracy of the dictation is limited by the abilities of the software. I have reviewed the note prior to signing, however some errors in grammar and context are still possible. If you have any questions related to this note please do not hesitate to contact our office.

## 2024-12-16 DIAGNOSIS — Z79.890 POSTMENOPAUSAL HRT (HORMONE REPLACEMENT THERAPY): ICD-10-CM

## 2024-12-16 DIAGNOSIS — N95.1 VASOMOTOR SYMPTOMS DUE TO MENOPAUSE: ICD-10-CM

## 2024-12-16 RX ORDER — ESTRADIOL 0.5 MG/1
0.5 TABLET ORAL
Qty: 45 TABLET | Refills: 0 | Status: SHIPPED | OUTPATIENT
Start: 2024-12-16

## 2025-01-15 ENCOUNTER — TELEMEDICINE (OUTPATIENT)
Dept: BEHAVIORAL HEALTH | Facility: CLINIC | Age: 70
End: 2025-01-15
Payer: COMMERCIAL

## 2025-01-15 DIAGNOSIS — F41.1 GAD (GENERALIZED ANXIETY DISORDER): ICD-10-CM

## 2025-01-15 PROCEDURE — 99214 OFFICE O/P EST MOD 30 MIN: CPT | Performed by: PSYCHIATRY & NEUROLOGY

## 2025-01-15 NOTE — PROGRESS NOTES
This evaluation was conducted via Teams using secure and encrypted videoconferencing technology. The patient was in their home in the [facility].    The patient's identity was confirmed and verbal consent was obtained for this virtual visit.      PSYCHIATRY FOLLOW-UP NOTE      Name: Lizy Merlos  MRN: [id number]  : 1955  Age: 69 y.o.  Date of assessment: 1/15/2025  PCP: Norma Holguin M.D.  Persons in attendance: Patient      REASON FOR VISIT/CHIEF COMPLAINT (as stated by Patient):  Lizy Merlos is a 69 y.o., White female, attending follow-up appointment for mood and anxiety management.      HISTORY OF PRESENT ILLNESS:  Lizy Merlos is a 69 y.o. old female with MDD and VANI comes in today for follow up. Patient was last seen 1 month ago, and following treatment planning recommendations were done:  Continue Lexapro 20 mg daily for depression and anxiety management.    Continue Buspirone 15 mg TID for anxiety management.    Initiate psychotherapy  To continue follow-up with primary care physician for management of comorbid medical conditions.    History of Present Illness    A recent incident involving her upstairs neighbor, who moved in with two small children, has exacerbated her anxiety. The constant noise from the children running and jumping has been a significant stressor, leading to a panic attack. Despite her attempts to address the issue with the apartment management, they have decided to evict her instead. She has since filed a complaint with [facility] and is in the process of relocating to a new house.   She has expressed interest in talk therapy and will make the appointment today.  She is currently on a regimen of Lexapro and BuSpar, taken three times daily.  Overall she reports doing well and agreed with not titrating medications further but agreed with using additional BuSpar 7.5-15 mg as PRN for panic attack if indicated.        CURRENT MEDICATIONS:  Current Outpatient  Medications   Medication Sig Dispense Refill    estradiol (ESTRACE) 0.5 MG tablet Take 1 Tablet by mouth every 48 hours. 45 Tablet 0    busPIRone (BUSPAR) 15 MG tablet Take 1 Tablet by mouth 3 times a day. 270 Tablet 3    progesterone (PROMETRIUM) 100 MG Cap TAKE 1 CAPSULE DAILY 90 Capsule 1    simvastatin (ZOCOR) 40 MG Tab Take 1 Tablet by mouth every evening. 90 Tablet 3    omeprazole (PRILOSEC) 20 MG delayed-release capsule Take 1 Capsule by mouth every morning. 90 Capsule 3    albuterol 108 (90 Base) MCG/ACT Aero Soln inhalation aerosol Inhale 2 Puffs every 6 hours as needed for Shortness of Breath. 8.5 g 5    escitalopram (LEXAPRO) 20 MG tablet Take 1 Tablet by mouth every day. 90 Tablet 3     No current facility-administered medications for this visit.       MEDICAL HISTORY  Past Medical History:   Diagnosis Date    Alcohol abuse     Anxiety disorder 09/2019    Arthritis 09/2019    hips, spine, hands    Depression 09/2019    High cholesterol     Liver disease     Restless leg syndrome     Sleep apnea     cpap    Snoring     Urinary incontinence      Past Surgical History:   Procedure Laterality Date    PB REPAIR ACHILLES TENDON,SECONDARY Right 4/20/2023    Procedure: RIGHT ANKLE ACHILLES DEBRIDEMENT AND REPAIR;  Surgeon: Jake Jimenez M.D.;  Location: [facility];  Service: Orthopedics    PB XFER SINGLE DEEP LOW LEG TENDON Right 4/20/2023    Procedure: RIGHT REVISION FLEXOR HALLUCIS LONGUS TRANSFER;  Surgeon: Jake Jimenez M.D.;  Location: [facility];  Service: Orthopedics    PB REMOVAL OF HEEL BONE Right 4/20/2023    Procedure: RIGHT CALCANEAL SPUR EXCISION;  Surgeon: Jake Jimenez M.D.;  Location: [facility];  Service: Orthopedics    PB RELEASE TIB/FIB/ANKLE FLEX TENDON,EA Left 4/20/2023    Procedure: RIGHT PERONEAL TENOLYSIS;  Surgeon: Jake Jimenez M.D.;  Location: [facility];  Service: Orthopedics    PB REPAIR  COLLAT ANKLE LIGMNT,SECONDARY Right 4/20/2023    Procedure: RIGHT LATERAL LIGAMENT REPAIR;  Surgeon: Jake Jimenez M.D.;  Location: [facility] [place];  Service: Orthopedics    PB RUDOLPH W/O FACETEC FORAMOT/DSKC 1/2 VRT SEG, CE*  12/18/2020    Procedure: LAMINECTOMY, SPINE, CERVICAL, POSTERIOR APPROACH-STAGE#3 C6-T1 LAMI AND C5-7 FACETECTOMY;  Surgeon: Lavell Rincon M.D.;  Location: [facility];  Service: Neurosurgery    CERVICAL DISK AND FUSION ANTERIOR  12/18/2020    Procedure: DISCECTOMY, SPINE, CERVICAL, ANTERIOR APPROACH, WITH FUSION-STAGE #1 C3-5 DISCECTOMY WITH RECURRENT LARYNGEAL NERVE MONITORING SSEPS;  Surgeon: Lavell Rincon M.D.;  Location: [facility];  Service: Neurosurgery    CORPECTOMY  12/18/2020    Procedure: CORPECTOMY, SPINE-ANTERIOR C4;  Surgeon: Lavell Rincon M.D.;  Location: [facility];  Service: Neurosurgery    POSTERIOR CERVICAL FUSION O-ARM  12/18/2020    Procedure: FUSION, SPINE, CERVICAL, POSTERIOR APPROACH, WITH O-ARM IMAGING GUIDANCE-STAGE #2 C3-C6, C7-T3 LATERAL FUSION;  Surgeon: Lavell Rincon M.D.;  Location: [facility];  Service: Neurosurgery    NECK EXPLORATION Right 12/18/2020    Procedure: EXPLORATION, NECK AND CERVICAL SPINE EXPOSURE;  Surgeon: Lavell Rincon M.D.;  Location: [facility];  Service: Neurosurgery    PB SHLDR ARTHROSCOP,SURG,W/ROTAT CUFF REPB Left 10/1/2019    Procedure: ARTHROSCOPY, SHOULDER, WITH ROTATOR CUFF REPAIR;  Surgeon: Lavell Hooks M.D.;  Location: [facility];  Service: Orthopedics    ME SHLDR ARTHROSCOP,PART ACROMIOPLAS Left 10/1/2019    Procedure: DECOMPRESSION, SHOULDER, ARTHROSCOPIC - SUBACROMIAL W/LABRAL DEBRIDEMENT;  Surgeon: Lavell Hooks M.D.;  Location: [facility];  Service: Orthopedics    PB ARTHROSCOPY SHOULDER SURGICAL BICEPS TENODES* Left 10/1/2019    Procedure: ARTHROSCOPY, SHOULDER, WITH BICEPS TENODESIS - W/OPEN SUBPECTORAL  TENODESIS, PROCEED AS INDICATED;  Surgeon: Lavell Hooks M.D.;  Location: [facility];  Service: Orthopedics    CERVICAL DISK AND FUSION ANTERIOR   and     PRIMARY C SECTION      twice     SHOULDER SURGERY Left     Rotator cuff    SINUSCOPE      TONSILLECTOMY         PAST PSYCHIATRIC MEDICATIONS  Lexapro  Mirtazapine-increased food craving  Gabapentin  Do not remember names of other medication trials        REVIEW OF SYSTEMS:        Constitutional negative   Eyes negative   Ears/Nose/Mouth/Throat negative   Cardiovascular negative   Respiratory negative   Gastrointestinal negative   Genitourinary negative   Muscular  chronic back pain   Integumentary negative   Neurological negative   Endocrine negative   Hematologic/Lymphatic negative     PHYSICAL EXAMINAION:  Vital signs: LMP  (LMP Unknown)   Musculoskeletal: Normal gait.   Abnormal movements: none      MENTAL STATUS EXAMINATION      General:   - Grooming and hygiene: Casual,   - Apparent distress: none,   - Behavior: Calm  - Eye Contact:  Good,   - no psychomotor agitation or retardation    - Participation: Active verbal participation  Orientation: Alert and Fully Oriented to person, place and time  Mood: Euthymic  Affect: Flexible and Full range,  Thought Process: Logical and Goal-directed  Thought Content: Denies suicidal or homicidal ideations, intent or plan   Perception: Denies auditory or visual hallucinations. No delusions noted   Attention span and concentration: Intact   Speech:Rate within normal limits and Volume within normal limits  Language: Appropriate   Insight: Good  Judgment: Good  Recent and remote memory: No gross evidence of memory deficits        DEPRESSION SCREENIN/6/2022     1:00 PM 4/3/2023     7:40 AM 2024    11:00 AM   Depression Screen (PHQ-2/PHQ-9)   PHQ-2 Total Score 0 0 1   PHQ-9 Total Score   8       Interpretation of PHQ-9 Total Score   Score Severity   1-4 No Depression   5-9 Mild  Depression   10-14 Moderate Depression   15-19 Moderately Severe Depression   20-27 Severe Depression    CURRENT RISK:       Suicidal: Low       Homicidal: Low       Self-Harm: Low       Relapse: Low       Crisis Safety Plan Reviewed Not Indicated       If evidence of imminent risk is present, intervention/plan:      MEDICAL RECORDS/LABS/DIAGNOSTIC TESTS REVIEWED:  No new lab since last visit     [place] [facility] records -   Reviewed     PLAN:  (1) MDD (2) VANI (3) r/o Mood disorder secondary to medical conditions.  Slow improvement  Continue Lexapro 20 mg daily for depression and anxiety management.    Continue Buspirone 15 mg TID for anxiety management.    Initiate psychotherapy  To continue follow-up with primary care physician for management of comorbid medical conditions.  Medication options, alternatives (including no medications) and medication risks/benefits/side effects were discussed in detail.  Explained importance of contraceptive measures while on psychotropic medications, educated to let provider know if ever pregnant or wanting to become pregnant. Verbalized understanding.  The patient was advised to call, message provider on wavecatchhart, or come in to the clinic if symptoms worsen or if any future questions/issues regarding their medications arise; the patient verbalized understanding and agreement.    The patient was educated to call 911, call the suicide hotline, or go to local ER if having thoughts of suicide or homicide; verbalized understanding.      Billing Coding based on:  46909 based on MDM    Return to clinic in 3 months or sooner if symptoms worsen.  Next Appointment: instruction provided on how to make the next appointment.     The proposed treatment plan was discussed with the patient who was provided the opportunity to ask questions and make suggestions regarding alternative treatment. Patient verbalized understanding and expressed agreement with the plan.       Darian Pathak M.D.  01/15/25    This  note was created using voice recognition software (Dragon). The accuracy of the dictation is limited by the abilities of the software. I have reviewed the note prior to signing, however some errors in grammar and context are still possible. If you have any questions related to this note please do not hesitate to contact our office.

## 2025-01-24 ENCOUNTER — TELEMEDICINE (OUTPATIENT)
Dept: BEHAVIORAL HEALTH | Facility: CLINIC | Age: 70
End: 2025-01-24
Payer: COMMERCIAL

## 2025-01-24 DIAGNOSIS — F41.1 GAD (GENERALIZED ANXIETY DISORDER): ICD-10-CM

## 2025-01-24 PROCEDURE — 90791 PSYCH DIAGNOSTIC EVALUATION: CPT | Performed by: MARRIAGE & FAMILY THERAPIST

## 2025-01-24 ASSESSMENT — ANXIETY QUESTIONNAIRES
6. BECOMING EASILY ANNOYED OR IRRITABLE: NEARLY EVERY DAY
7. FEELING AFRAID AS IF SOMETHING AWFUL MIGHT HAPPEN: MORE THAN HALF THE DAYS
2. NOT BEING ABLE TO STOP OR CONTROL WORRYING: NEARLY EVERY DAY
4. TROUBLE RELAXING: MORE THAN HALF THE DAYS
IF YOU CHECKED OFF ANY PROBLEMS ON THIS QUESTIONNAIRE, HOW DIFFICULT HAVE THESE PROBLEMS MADE IT FOR YOU TO DO YOUR WORK, TAKE CARE OF THINGS AT HOME, OR GET ALONG WITH OTHER PEOPLE: EXTREMELY DIFFICULT
GAD7 TOTAL SCORE: 17
1. FEELING NERVOUS, ANXIOUS, OR ON EDGE: NEARLY EVERY DAY
5. BEING SO RESTLESS THAT IT IS HARD TO SIT STILL: SEVERAL DAYS
3. WORRYING TOO MUCH ABOUT DIFFERENT THINGS: NEARLY EVERY DAY

## 2025-01-24 ASSESSMENT — PATIENT HEALTH QUESTIONNAIRE - PHQ9
CLINICAL INTERPRETATION OF PHQ2 SCORE: 3
SUM OF ALL RESPONSES TO PHQ QUESTIONS 1-9: 11
5. POOR APPETITE OR OVEREATING: 1 - SEVERAL DAYS

## 2025-01-24 NOTE — PROGRESS NOTES
[facility]   Therapy Progress Note      This visit was conducted via MS Teams using secure and encrypted videoconferencing technology.  The patient was in a private location in the state of [place].  The patient's identity was confirmed and verbal consent was obtained for this virtual visit.  Place of Service:   POS 10 -The patient is at Home during their visit           Name: Lizy Merlos  MRN: [id number]  : 1955  Age: 69 y.o.  Date of assessment: 2025  PCP: Norma Holguin M.D.  Persons in attendance: Patient  Total session time: 50 minutes    Objective Observations:   Participation:Active verbal participation, Attentive, Engaged, and Open to feedback   Grooming:Casual and Neat   Cognition:Alert and Fully Oriented   Eye Contact:Good   Mood:Euthymic   Affect: Flexible, Expansive, Congruent with Content   Thought Process:Logical and Goal-directed   Speech:Rate within normal limits and Volume within normal limits    Current Risk:   Suicide: low   Homicide: low   Self-Harm: low     Topics addressed in psychotherapy include: It has been over 1 year since our last session, which was an intake for outpatient therapy. Lizy reports that she continues to feel frustrated in her marriage to her  that she reports has ADHD. This is her 2nd marriage, and her first marriage was to a narcissist who had drug problems. She identifies that her current  is invalidating and doesn't take the time to make her feel respected when they are having difficulties in communicating, and she read me a text exchange between them that escalated in order to show me an example of their dyfunctional dynamics. I discussed with Lizy that this is part of a cycle and that she has the choice not to engage in the cycle with the same negative behaviors that her  does, and that there is a possiblity that will be sufficient enough to promote a more positive exchange if he isn't getting a negative and reactive  response from her. She is encouraged to do something that will help her feel grounded when she is emotionally dysregulated when she feels a need to react quickly and with high emotions. She reports that she used to do trasncendental meditation and that she will try to do that some more before responding to his negative and incindiary remarks. We discussed that for future sessions we can review more grounding techniques and more supportive psychotherapy.      Care Plan Updated: Yes    Does patient express agreement with the above plan? Yes     Diagnosis:  1. VANI (generalized anxiety disorder)        Referral appointment(s) scheduled? Yes       AKASH Garnett.

## 2025-02-03 ENCOUNTER — APPOINTMENT (OUTPATIENT)
Dept: MEDICAL GROUP | Facility: MEDICAL CENTER | Age: 70
End: 2025-02-03
Payer: COMMERCIAL

## 2025-02-03 VITALS
DIASTOLIC BLOOD PRESSURE: 66 MMHG | HEIGHT: 63 IN | HEART RATE: 68 BPM | TEMPERATURE: 96.8 F | WEIGHT: 170.86 LBS | SYSTOLIC BLOOD PRESSURE: 126 MMHG | OXYGEN SATURATION: 97 % | BODY MASS INDEX: 30.27 KG/M2

## 2025-02-03 DIAGNOSIS — G89.29 CHRONIC BILATERAL LOW BACK PAIN WITH BILATERAL SCIATICA: ICD-10-CM

## 2025-02-03 DIAGNOSIS — M54.12 CERVICAL RADICULOPATHY: ICD-10-CM

## 2025-02-03 DIAGNOSIS — K21.9 GASTROESOPHAGEAL REFLUX DISEASE WITHOUT ESOPHAGITIS: ICD-10-CM

## 2025-02-03 DIAGNOSIS — E78.00 HYPERCHOLESTEROLEMIA: ICD-10-CM

## 2025-02-03 DIAGNOSIS — G47.33 OSA ON CPAP: ICD-10-CM

## 2025-02-03 DIAGNOSIS — M70.61 TROCHANTERIC BURSITIS OF BOTH HIPS: ICD-10-CM

## 2025-02-03 DIAGNOSIS — M54.42 CHRONIC BILATERAL LOW BACK PAIN WITH BILATERAL SCIATICA: ICD-10-CM

## 2025-02-03 DIAGNOSIS — Z00.00 PE (PHYSICAL EXAM), ANNUAL: Primary | ICD-10-CM

## 2025-02-03 DIAGNOSIS — Z78.9 DAILY CONSUMPTION OF ALCOHOL: ICD-10-CM

## 2025-02-03 DIAGNOSIS — E66.9 OBESITY (BMI 30-39.9): ICD-10-CM

## 2025-02-03 DIAGNOSIS — Z79.890 POSTMENOPAUSAL HRT (HORMONE REPLACEMENT THERAPY): ICD-10-CM

## 2025-02-03 DIAGNOSIS — M70.62 TROCHANTERIC BURSITIS OF BOTH HIPS: ICD-10-CM

## 2025-02-03 DIAGNOSIS — F41.1 GAD (GENERALIZED ANXIETY DISORDER): ICD-10-CM

## 2025-02-03 DIAGNOSIS — M54.41 CHRONIC BILATERAL LOW BACK PAIN WITH BILATERAL SCIATICA: ICD-10-CM

## 2025-02-03 PROBLEM — R91.8 LUNG NODULES: Status: RESOLVED | Noted: 2023-01-19 | Resolved: 2025-02-03

## 2025-02-03 PROBLEM — M18.0 OSTEOARTHRITIS OF CARPOMETACARPAL (CMC) JOINT OF BOTH THUMBS: Status: RESOLVED | Noted: 2020-03-27 | Resolved: 2025-02-03

## 2025-02-03 PROBLEM — J38.3 VOCAL CORD DYSFUNCTION: Status: RESOLVED | Noted: 2020-12-03 | Resolved: 2025-02-03

## 2025-02-03 PROCEDURE — 3074F SYST BP LT 130 MM HG: CPT | Performed by: FAMILY MEDICINE

## 2025-02-03 PROCEDURE — 3078F DIAST BP <80 MM HG: CPT | Performed by: FAMILY MEDICINE

## 2025-02-03 PROCEDURE — 99397 PER PM REEVAL EST PAT 65+ YR: CPT | Performed by: FAMILY MEDICINE

## 2025-02-03 RX ORDER — MELOXICAM 15 MG/1
15 TABLET ORAL DAILY
Qty: 30 TABLET | Refills: 0 | Status: SHIPPED | OUTPATIENT
Start: 2025-02-03 | End: 2025-02-26 | Stop reason: SDUPTHER

## 2025-02-03 RX ORDER — ESTRADIOL 0.05 MG/D
1 PATCH, EXTENDED RELEASE TRANSDERMAL
Qty: 24 PATCH | Refills: 3 | Status: SHIPPED | OUTPATIENT
Start: 2025-02-03

## 2025-02-03 RX ORDER — PROGESTERONE 100 MG/1
100 CAPSULE ORAL DAILY
Qty: 90 CAPSULE | Refills: 1 | Status: SHIPPED | OUTPATIENT
Start: 2025-02-03

## 2025-02-03 ASSESSMENT — FIBROSIS 4 INDEX: FIB4 SCORE: 0.9

## 2025-02-03 NOTE — PROGRESS NOTES
Verbal consent was acquired by the patient to use [employer] ambient listening note generation during this visit: YES    CC: annual PE, hip pain, alcohol f/u     Assessment & Plan:     1. Cervical radiculopathy_s/p decompression and fusion by Dr Rincon in 12/2020  2. Chronic bilateral low back pain with bilateral sciatica  Chronic, working with neurosurgery, gets minimally invasive procedures for pain control. Doing well currently.   - rehab exercises   - weight loss  - icing, rest, activity modification   - meloxicam (MOBIC) 15 MG tablet; Take 1 Tablet by mouth every day. With foods  Dispense: 30 Tablet; Refill: 0  - I discussed health risks/complications associated with chronic consumption of NSAID. Appropriate counseling provided.      3. PRAVEEN on CPAP  - recommended weight loss and consistent use of CPAP  - avoid alcohol, sedatives.   - Pt was previously counseled on risks of untreated PRAVEEN including: increased risk of cardiovascular disease including coronary artery disease, systemic arterial hypertension, pulmonary arterial hypertension, cardiac arrythmias, stroke, increased risk of MVA due to drowsiness, type 2 diabetes, chronic kidney disease, and non-alcoholic liver disease.      4. Hypercholesterolemia  Chronic, controlled with Zocor 40 mg qd, no s/e reported, will continue.    - CBC WITH DIFFERENTIAL; Future  - Comp Metabolic Panel; Future  - Lipid Profile; Future    5. Postmenopausal HRT (hormone replacement therapy)  Chronic, controlled with oral Estradiol 0.5 mg qd and Prometrium 100 mg qd, will switch patient to transdermal estradiol instead.    - estradiol (KENDRA) 0.05 MG/24HR PATCH BIWEEKLY; Place 1 Patch on the skin two times a week.  Dispense: 24 Patch; Refill: 3  - progesterone (PROMETRIUM) 100 MG Cap; Take 1 Capsule by mouth every day.  Dispense: 90 Capsule; Refill: 1    6. Gastroesophageal reflux disease without esophagitis  Chronic, controlled with Omeprazole 20 mg daily PRN, no s/e reported,  will continue.      7. VANI (generalized anxiety disorder)  Chronic, controlled with Lexapro 20 mg qd and Buspar 15 mg TID, no s/e reported, will continue.      8. PE (physical exam), annual (Primary)  Labs per orders  Immunization reviewed and discussed.  Patient was counseled about  diet, supplements, exercises.   Preventive cares reviewed, discussed, and updated as appropriate.       9. Obesity (BMI 30-39.9)  Body mass index is 30.27 kg/m².   She complains of increased weight gain since last office visit.   She is active but does not exercise regularly.   She will contact her insurance to discuss coverage for Wegovy or Zepbound.   She cuts back on daily alcohol consumption.   - Patient identified as having weight management issue.  Appropriate orders and counseling given.    10. Daily consumption of alcohol  Chronic, able to cut back to 2 servings of alcohol per day.   She will continue to try to cut back further.   - VITAMIN B1; Future    11. Trochanteric bursitis of both hips  History and exam are concerning for mild bilateral trochanteric bursitis.   - diclofenac sodium (VOLTAREN) 1 % Gel; Apply to 2-4 gram to affected area 4 times daily  Dispense: 150 g; Refill: 2  - home rehab exercises provided.              Subjective:       HPI:     History of Present Illness  The patient presents for evaluation of easy bruising, hip pain, weight management, and medication management.    Chronic neck and back pain:   -Patient has chronic neck and low back pain.  She is working with neurosurgery.  She had neck surgery in the past.  She is currently getting intermittent epidural injection for pain control as needed.  She is doing relatively well at this time.   -She wishes to have prescription for meloxicam to be taken as needed.  She is aware of side effects associated with chronic NSAID.      Bilateral hip pain:   - she complains of mild burning, non-radiating pain at bilateral lateral hips w/o history of trauma, rash.  Pain is worse at night when she lies on the respective side. Pain is rated 2/10 sometimes. Denies history of trauma to affected area.   - She does not experience pain during ambulation.  - She requests a refill of meloxicam, used sparingly for severe soreness.    Weight Management  - Struggles with weight loss. She continues to gain a few lbs since last office visit.   -She attributes weight gain to poor diet during the holiday and sedentary lifestyle due to cold weather.  She is motivated to work on this over the next few months.  She will also contact her insurance to discuss insurance coverage for Wegovy or Zepbound.    Patient is on chronic hormone replacement therapy for vasomotor symptoms of menopause.  She is on Prometrium 100 mg daily and oral estradiol 0.5 mg daily.  She tried to wean off both medications, but noted severe relapse of symptoms.  She has restarted both medications at this time.  Her symptoms are under good control.    Patient has history of chronic alcoholism.  She has significantly reduced alcohol consumption to 2 servings every evening.      She received her influenza vaccine at [facility] in November.     IMMUNIZATIONS  - Influenza vaccine: Received at [facility] in November  - New COVID-19 vaccine: Received at [facility] in November        Current Outpatient Medications:     meloxicam (MOBIC) 15 MG tablet, Take 1 Tablet by mouth every day. With foods, Disp: 30 Tablet, Rfl: 0    diclofenac sodium (VOLTAREN) 1 % Gel, Apply to 2-4 gram to affected area 4 times daily, Disp: 150 g, Rfl: 2    estradiol (KENDRA) 0.05 MG/24HR PATCH BIWEEKLY, Place 1 Patch on the skin two times a week., Disp: 24 Patch, Rfl: 3    progesterone (PROMETRIUM) 100 MG Cap, Take 1 Capsule by mouth every day., Disp: 90 Capsule, Rfl: 1    busPIRone (BUSPAR) 15 MG tablet, Take 1 Tablet by mouth 3 times a day., Disp: 270 Tablet, Rfl: 3    simvastatin (ZOCOR) 40 MG Tab, Take 1 Tablet by mouth every evening., Disp: 90 Tablet, Rfl: 3    omeprazole  "(PRILOSEC) 20 MG delayed-release capsule, Take 1 Capsule by mouth every morning., Disp: 90 Capsule, Rfl: 3    albuterol 108 (90 Base) MCG/ACT Aero Soln inhalation aerosol, Inhale 2 Puffs every 6 hours as needed for Shortness of Breath., Disp: 8.5 g, Rfl: 5    escitalopram (LEXAPRO) 20 MG tablet, Take 1 Tablet by mouth every day., Disp: 90 Tablet, Rfl: 3     Objective:     Exam:  /66 (BP Location: Left arm, Patient Position: Sitting, BP Cuff Size: Adult long)   Pulse 68   Temp 36 °C (96.8 °F) (Temporal)   Ht 1.6 m (5' 3\")   Wt 77.5 kg (170 lb 13.7 oz)   LMP  (LMP Unknown)   SpO2 97%   BMI 30.27 kg/m²  Body mass index is 30.27 kg/m².    Constitutional: awake, alert, in no distress.  Skin: Warm, dry, good turgor, no rashes, bruises, ulcers in visible areas.  Eye: conjunctiva clear, lids neg for edema or lesions.  Respiratory: Unlabored respiratory effort, lungs clear to auscultation, no wheezes, no rales.  Cardiovascular: Normal S1, S2, no murmur, no pedal edema.  Abdomen: Soft, non-tender to palpation, active BS, no hernia, no hepatosplenomegaly, negative rebound or guarding.   Neuro: CN2-12 grossly intact. Strength 5/5, reflexes 2/4 in all extremities, no sensory deficits.   Psych: Oriented x3, affect and mood wnl, intact judgement and insight.   MSK: Mild tenderness to palpation at soft tissue surrounding greater trochanters bilaterally.  Negative overlying soft tissue redness, edema.  Bilateral hip exam was overall normal except for reduced range of motion and crepitus.        Return in about 6 months (around 8/3/2025) for Multiple issues.          Please note that this dictation was created using voice recognition software. I have made every reasonable attempt to correct obvious errors, but I expect that there are errors of grammar and possibly content that I did not discover before finalizing the note.        "

## 2025-02-17 ENCOUNTER — TELEPHONE (OUTPATIENT)
Dept: HEALTH INFORMATION MANAGEMENT | Facility: OTHER | Age: 70
End: 2025-02-17

## 2025-02-26 DIAGNOSIS — M54.12 CERVICAL RADICULOPATHY: ICD-10-CM

## 2025-02-26 DIAGNOSIS — M54.42 CHRONIC BILATERAL LOW BACK PAIN WITH BILATERAL SCIATICA: ICD-10-CM

## 2025-02-26 DIAGNOSIS — G89.29 CHRONIC BILATERAL LOW BACK PAIN WITH BILATERAL SCIATICA: ICD-10-CM

## 2025-02-26 DIAGNOSIS — M54.41 CHRONIC BILATERAL LOW BACK PAIN WITH BILATERAL SCIATICA: ICD-10-CM

## 2025-02-27 RX ORDER — MELOXICAM 15 MG/1
15 TABLET ORAL
Qty: 60 TABLET | Refills: 0 | Status: SHIPPED | OUTPATIENT
Start: 2025-02-27

## 2025-03-20 NOTE — PROGRESS NOTES
Neurosurgery Progress Note    Subjective:  No acute events. Sitting up at bedside awaiting RN for assist to BR. Observed pt to ambulate independently with FWW and standby assist, stable gait.   Pt denies difficulty swallowing, breathing.   Pain well controlled with oral meds  Denies any radicular arm pain, just some residual numbness of bilat hands at fingertips    Exam:  A/Ox4. Fluent speech. Voice is slightly hoarse.  RUE 5/5, BLE deltoid 4/5, pt's baseline from shoulder injury, bic/tri 5/5  Anterior cervical incision without drainage. Mild bruising and swelling at right lateral aspect of neck. No midline swelling. Again pt denies any difficulty with swallowing or breathing.   Eating, drinking without difficulty.  Posterior dressing c/d/i.   HVAC posterior 95ml/8hrs    BP  Min: 125/75  Max: 138/84  Pulse  Av.6  Min: 74  Max: 88  Resp  Av.2  Min: 16  Max: 17  Temp  Av.3 °C (97.3 °F)  Min: 36.1 °C (97 °F)  Max: 36.5 °C (97.7 °F)  SpO2  Av.6 %  Min: 92 %  Max: 97 %    No data recorded    Recent Labs     20  1514 20  0147 20  0641   WBC  --  11.8* 11.9*   RBC  --  3.78* 3.88*   HEMOGLOBIN 12.1 11.2* 11.6*   HEMATOCRIT 36.6* 34.9* 35.6*   MCV  --  92.3 91.8   MCH  --  29.6 29.9   MCHC  --  32.1* 32.6*   RDW  --  42.5 42.1   PLATELETCT  --  325 319   MPV  --  9.1 9.2                   Intake/Output       20 0700 - 20 0659 20 0700 - 20 0659      [id number] Total [id number] Total       Intake    P.O.  --  240 240  --  -- --    P.O. -- 240 240 -- -- --    Total Intake -- 240 240 -- -- --       Output    Urine  700  300 1000  --  -- --    Urine Void (mL)  -- -- --    Drains  120  210 330  --  -- --    Output (mL) (Closed/Suction Drain 1 Posterior Neck Hemovac) 120 210 330 -- -- --    Total Output  -- -- --       Net I/O     -820 -270 -1090 -- -- --            Intake/Output Summary (Last 24 hours) at 2020 0922  Last  data filed at 12/20/2020 0400  Gross per 24 hour   Intake 240 ml   Output 1330 ml   Net -1090 ml            • oxyCODONE immediate-release  5 mg Q4HRS PRN    Or   • oxyCODONE immediate-release  10 mg Q4HRS PRN   • morphine injection  2-4 mg Q4HRS PRN   • Pharmacy Consult Request  1 Each PHARMACY TO DOSE   • MD ALERT...DO NOT ADMINISTER NSAIDS or ASPIRIN unless ORDERED By Neurosurgery  1 Each PRN   • docusate sodium  100 mg BID   • senna-docusate  1 Tab Nightly   • senna-docusate  1 Tab Q24HRS PRN   • polyethylene glycol/lytes  1 Packet BID PRN   • magnesium hydroxide  30 mL QDAY PRN   • bisacodyl  10 mg Q24HRS PRN   • fleet  1 Each Once PRN   • dextrose 5 % and 0.9 % NaCl with KCl 20 mEq   Continuous   • acetaminophen  1,000 mg Q6HRS   • morphine injection  4 mg Once PRN   • diphenhydrAMINE  25 mg Q6HRS PRN    Or   • diphenhydrAMINE  25 mg Q6HRS PRN   • ondansetron  4 mg Q4HRS PRN   • ondansetron  4 mg Q4HRS PRN   • methocarbamol  750 mg Q8HRS PRN    Or   • cyclobenzaprine  10 mg Q8HRS PRN    Or   • diazePAM  5 mg Q4HRS PRN   • labetalol  10 mg Q HOUR PRN   • benzocaine-menthol  1 Lozenge Q2HRS PRN   • calcium carbonate  500 mg BID       Assessment and Plan:  Hospital day # 3  POD #2 Ant C4 corpectomy, C3-5 discectomy, posterior C3-T3 fusion  Prophylactic anticoagulation: no         Start date/time: TBD      Plan:  Stable neuro  HH order for PT/OT placed per recommendations  DME order for FWW, bath/shower chair, BSC placed  Mild swelling and bruising around anterior cervical incision, tolerating diet  Continue to monitor anterior cervical surgical site. If any difficulty with swallowing, breathing page NSG immediately  Continue with posterior hemovac for today  Pain controlled with oral pain meds  Labs stable            Home

## 2025-03-31 ENCOUNTER — OFFICE VISIT (OUTPATIENT)
Dept: MEDICAL GROUP | Facility: MEDICAL CENTER | Age: 70
End: 2025-03-31
Payer: COMMERCIAL

## 2025-03-31 VITALS
HEIGHT: 63 IN | DIASTOLIC BLOOD PRESSURE: 70 MMHG | OXYGEN SATURATION: 92 % | HEART RATE: 67 BPM | BODY MASS INDEX: 29.18 KG/M2 | TEMPERATURE: 96.9 F | SYSTOLIC BLOOD PRESSURE: 122 MMHG | WEIGHT: 164.68 LBS

## 2025-03-31 DIAGNOSIS — Z87.891 HISTORY OF NICOTINE DEPENDENCE: ICD-10-CM

## 2025-03-31 DIAGNOSIS — Z87.891 FORMER SMOKER: ICD-10-CM

## 2025-03-31 DIAGNOSIS — J30.9 ALLERGIC RHINITIS, UNSPECIFIED SEASONALITY, UNSPECIFIED TRIGGER: ICD-10-CM

## 2025-03-31 DIAGNOSIS — F41.1 GAD (GENERALIZED ANXIETY DISORDER): ICD-10-CM

## 2025-03-31 DIAGNOSIS — Z79.890 POSTMENOPAUSAL HRT (HORMONE REPLACEMENT THERAPY): ICD-10-CM

## 2025-03-31 RX ORDER — TRIAMCINOLONE ACETONIDE 40 MG/ML
40 INJECTION, SUSPENSION INTRA-ARTICULAR; INTRAMUSCULAR ONCE
Status: COMPLETED | OUTPATIENT
Start: 2025-03-31 | End: 2025-03-31

## 2025-03-31 RX ORDER — FLUTICASONE PROPIONATE 50 MCG
2 SPRAY, SUSPENSION (ML) NASAL DAILY
Qty: 16 G | Refills: 1 | Status: SHIPPED | OUTPATIENT
Start: 2025-03-31

## 2025-03-31 RX ADMIN — TRIAMCINOLONE ACETONIDE 40 MG: 40 INJECTION, SUSPENSION INTRA-ARTICULAR; INTRAMUSCULAR at 16:01

## 2025-03-31 ASSESSMENT — FIBROSIS 4 INDEX: FIB4 SCORE: 0.9

## 2025-04-01 NOTE — PROGRESS NOTES
Verbal consent was acquired by the patient to use "[employer] ambient listening note generation during this visit: YES    CC: sinus symptoms     Assessment & Plan:     1. Allergic rhinitis, unspecified seasonality, unspecified trigger  History and exam are concerning for acute allergic rhinitis.   - fluticasone (FLONASE) 50 MCG/ACT nasal spray; Administer 2 Sprays into affected nostril(S) every day.  Dispense: 16 g; Refill: 1  - Kenalog injection  - over-the-counter antihistamine  -Nasal saline irrigation    2. Postmenopausal HRT (hormone replacement therapy)  Chronic, controlled with progesterone 100 mg daily and estradiol patch 0.05 mg per 24 hours, no s/e reported, will continue.      3. VANI (generalized anxiety disorder)  Chronic, controlled with Lexapro 20 mg daily and BuSpar 15 mg twice daily, no s/e reported, will continue.      4. Former smoker  5. History of nicotine dependence  - CT-LUNG CANCER-SCREENING; Future             Subjective:       HPI:     History of Present Illness  The patient presents for evaluation of sinus issues.    Patient was in her normal state of health until 7 days ago when she developed acute rhinorrhea and nasal congestion.  Patient suspected that she might have contracted viral illnesses from her .  Denies any fever, chills, ear pain, rhinorrhea, postnasal drips, cough.  Patient has not tried any over-the-counter medication for symptoms except for Vicks which appeared to help.  She is concerned of possible sinus infection.    - Using estrogen patches and progesterone, which are effective.   - her mood is stable. VANI is controlled with medication.  - No medication refills needed    Supplemental information: She quit smoking in 2012.    SOCIAL HISTORY  - Quit smoking in 2012        Current Outpatient Medications:   fluticasone (FLONASE) 50 MCG/ACT nasal spray, Administer 2 Sprays into affected nostril(S) every day., Disp: 16 g, Rfl: 1  meloxicam (MOBIC) 15 MG tablet, Take 1  "Tablet by mouth 1 time a day as needed for Inflammation or Severe Pain. With foods, Disp: 60 Tablet, Rfl: 0  diclofenac sodium (VOLTAREN) 1 % Gel, Apply to 2-4 gram to affected area 4 times daily, Disp: 150 g, Rfl: 2  estradiol (KENDRA) 0.05 MG/24HR PATCH BIWEEKLY, Place 1 Patch on the skin two times a week., Disp: 24 Patch, Rfl: 3  progesterone (PROMETRIUM) 100 MG Cap, Take 1 Capsule by mouth every day., Disp: 90 Capsule, Rfl: 1  busPIRone (BUSPAR) 15 MG tablet, Take 1 Tablet by mouth 3 times a day., Disp: 270 Tablet, Rfl: 3  simvastatin (ZOCOR) 40 MG Tab, Take 1 Tablet by mouth every evening., Disp: 90 Tablet, Rfl: 3  omeprazole (PRILOSEC) 20 MG delayed-release capsule, Take 1 Capsule by mouth every morning., Disp: 90 Capsule, Rfl: 3  albuterol 108 (90 Base) MCG/ACT Aero Soln inhalation aerosol, Inhale 2 Puffs every 6 hours as needed for Shortness of Breath., Disp: 8.5 g, Rfl: 5  escitalopram (LEXAPRO) 20 MG tablet, Take 1 Tablet by mouth every day., Disp: 90 Tablet, Rfl: 3     Objective:     Exam:  /70 (BP Location: Left arm, Patient Position: Sitting, BP Cuff Size: Adult long)   Pulse 67   Temp 36.1 °C (96.9 °F) (Temporal)   Ht 1.6 m (5' 3\")   Wt 74.7 kg (164 lb 10.9 oz)   LMP  (LMP Unknown)   SpO2 92%   BMI 29.17 kg/m²  Body mass index is 29.17 kg/m².    Constitutional: awake, alert, in no distress.  Skin: Warm, dry, good turgor, no rashes, bruises, ulcers in visible areas.  Eye: conjunctiva clear, lids neg for edema or lesions.  ENMT: TM and auditory canals wnl.  Pale and congested nasal mucosa with dry mucoid discharge.  Nasal septum deviation noted.  Inferior nasal turbinate hypertrophy noted on the right. Lips without lesions, good dentition, oropharynx clear.  Tonsils not visible.  Neck: Trachea midline, no masses, no thyromegaly. No cervical or supraclavicular lymphadenopathy  Respiratory: Unlabored respiratory effort, lungs clear to auscultation, no wheezes, no rales.  Cardiovascular: Normal " S1, S2, no murmur, no pedal edema.  Psych: Oriented x3, affect and mood wnl, intact judgement and insight.         Return in about 6 months (around 9/30/2025) for Multiple issues.          Please note that this dictation was created using voice recognition software. I have made every reasonable attempt to correct obvious errors, but I expect that there are errors of grammar and possibly content that I did not discover before finalizing the note.

## 2025-04-15 ENCOUNTER — TELEMEDICINE (OUTPATIENT)
Dept: BEHAVIORAL HEALTH | Facility: CLINIC | Age: 70
End: 2025-04-15
Payer: COMMERCIAL

## 2025-04-15 DIAGNOSIS — F41.1 GAD (GENERALIZED ANXIETY DISORDER): ICD-10-CM

## 2025-04-15 DIAGNOSIS — F10.10 ALCOHOL ABUSE: ICD-10-CM

## 2025-04-15 DIAGNOSIS — F33.42 RECURRENT MAJOR DEPRESSIVE DISORDER, IN FULL REMISSION (HCC): ICD-10-CM

## 2025-04-15 PROCEDURE — 99215 OFFICE O/P EST HI 40 MIN: CPT | Mod: 95 | Performed by: PSYCHIATRY & NEUROLOGY

## 2025-04-15 RX ORDER — NALTREXONE HYDROCHLORIDE 50 MG/1
50 TABLET, FILM COATED ORAL
Qty: 30 TABLET | Refills: 1 | Status: SHIPPED | OUTPATIENT
Start: 2025-04-15

## 2025-04-15 RX ORDER — BUSPIRONE HYDROCHLORIDE 10 MG/1
20 TABLET ORAL 3 TIMES DAILY
Qty: 540 TABLET | Refills: 1 | Status: SHIPPED | OUTPATIENT
Start: 2025-04-15 | End: 2025-07-14

## 2025-04-15 ASSESSMENT — PATIENT HEALTH QUESTIONNAIRE - PHQ9
2. FEELING DOWN, DEPRESSED, IRRITABLE, OR HOPELESS: 3
6. FEELING BAD ABOUT YOURSELF - OR THAT YOU ARE A FAILURE OR HAVE LET YOURSELF OR YOUR FAMILY DOWN: 3
6. FEELING BAD ABOUT YOURSELF - OR THAT YOU ARE A FAILURE OR HAVE LET YOURSELF OR YOUR FAMILY DOWN: NEARLY EVERY DAY
3. TROUBLE FALLING OR STAYING ASLEEP OR SLEEPING TOO MUCH: 3
4. FEELING TIRED OR HAVING LITTLE ENERGY: SEVERAL DAYS
7. TROUBLE CONCENTRATING ON THINGS, SUCH AS READING THE NEWSPAPER OR WATCHING TELEVISION: 2
1. LITTLE INTEREST OR PLEASURE IN DOING THINGS: 0
9. THOUGHTS THAT YOU WOULD BE BETTER OFF DEAD, OR OF HURTING YOURSELF: 3
9. THOUGHTS THAT YOU WOULD BE BETTER OFF DEAD, OR OF HURTING YOURSELF: NEARLY EVERY DAY
2. FEELING DOWN, DEPRESSED, IRRITABLE, OR HOPELESS: NEARLY EVERY DAY
5. POOR APPETITE OR OVEREATING: 1 - SEVERAL DAYS
5. POOR APPETITE OR OVEREATING: SEVERAL DAYS
CLINICAL INTERPRETATION OF PHQ2 SCORE: 0
8. MOVING OR SPEAKING SO SLOWLY THAT OTHER PEOPLE COULD HAVE NOTICED. OR THE OPPOSITE, BEING SO FIGETY OR RESTLESS THAT YOU HAVE BEEN MOVING AROUND A LOT MORE THAN USUAL: 1
SUM OF ALL RESPONSES TO PHQ QUESTIONS 1-9: 17
10. IF YOU CHECKED OFF ANY PROBLEMS, HOW DIFFICULT HAVE THESE PROBLEMS MADE IT FOR YOU TO DO YOUR WORK, TAKE CARE OF THINGS AT HOME, OR GET ALONG WITH OTHER PEOPLE: EXTREMELY DIFFICULT
3. TROUBLE FALLING OR STAYING ASLEEP OR SLEEPING TOO MUCH: NEARLY EVERY DAY
8. MOVING OR SPEAKING SO SLOWLY THAT OTHER PEOPLE COULD HAVE NOTICED. OR THE OPPOSITE, BEING SO FIGETY OR RESTLESS THAT YOU HAVE BEEN MOVING AROUND A LOT MORE THAN USUAL: SEVERAL DAYS
7. TROUBLE CONCENTRATING ON THINGS, SUCH AS READING THE NEWSPAPER OR WATCHING TELEVISION: MORE THAN HALF THE DAYS
1. LITTLE INTEREST OR PLEASURE IN DOING THINGS: NOT AT ALL
5. POOR APPETITE OR OVEREATING: 1
4. FEELING TIRED OR HAVING LITTLE ENERGY: 1

## 2025-04-15 ASSESSMENT — ANXIETY QUESTIONNAIRES
4. TROUBLE RELAXING: MORE THAN HALF THE DAYS
2. NOT BEING ABLE TO STOP OR CONTROL WORRYING: NEARLY EVERY DAY
7. FEELING AFRAID AS IF SOMETHING AWFUL MIGHT HAPPEN: NEARLY EVERY DAY
6. BECOMING EASILY ANNOYED OR IRRITABLE: NEARLY EVERY DAY
1. FEELING NERVOUS, ANXIOUS, OR ON EDGE: MORE THAN HALF THE DAYS
IF YOU CHECKED OFF ANY PROBLEMS ON THIS QUESTIONNAIRE, HOW DIFFICULT HAVE THESE PROBLEMS MADE IT FOR YOU TO DO YOUR WORK, TAKE CARE OF THINGS AT HOME, OR GET ALONG WITH OTHER PEOPLE: VERY DIFFICULT
5. BEING SO RESTLESS THAT IT IS HARD TO SIT STILL: NEARLY EVERY DAY
4. TROUBLE RELAXING: MORE THAN HALF THE DAYS
5. BEING SO RESTLESS THAT IT IS HARD TO SIT STILL: NEARLY EVERY DAY
3. WORRYING TOO MUCH ABOUT DIFFERENT THINGS: NEARLY EVERY DAY
3. WORRYING TOO MUCH ABOUT DIFFERENT THINGS: NEARLY EVERY DAY
IF YOU CHECKED OFF ANY PROBLEMS ON THIS QUESTIONNAIRE, HOW DIFFICULT HAVE THESE PROBLEMS MADE IT FOR YOU TO DO YOUR WORK, TAKE CARE OF THINGS AT HOME, OR GET ALONG WITH OTHER PEOPLE: VERY DIFFICULT
2. NOT BEING ABLE TO STOP OR CONTROL WORRYING: NEARLY EVERY DAY
7. FEELING AFRAID AS IF SOMETHING AWFUL MIGHT HAPPEN: NEARLY EVERY DAY
6. BECOMING EASILY ANNOYED OR IRRITABLE: NEARLY EVERY DAY
1. FEELING NERVOUS, ANXIOUS, OR ON EDGE: MORE THAN HALF THE DAYS
GAD7 TOTAL SCORE: 19

## 2025-04-15 NOTE — Clinical Note
REFERRAL APPROVAL NOTICE         Sent on April 15, 2025                   Lizy Merlos  [address] [place] [address]                   Dear Ms. Nat Merlos,    After a careful review of the medical information and benefit coverage, [facility] has processed your referral. See below for additional details.    If applicable, you must be actively enrolled with your insurance for coverage of the authorized service. If you have any questions regarding your coverage, please contact your insurance directly.    REFERRAL INFORMATION   Referral #:  [id number]  Referred-To Department    Referred-By Provider:  Behavioral [facility]    Darian Pathak M.D.   [facility]      [address]  [facility] [id number]  [phone] [address], [address]  [facility] [id number] [phone]    Referral Start Date:  04/15/2025  Referral End Date:   04/15/2026           SCHEDULING  If you do not already have an appointment, please call [phone] to make an appointment.   MORE INFORMATION  As a reminder, [facility] ownership has changed, meaning this location is now owned and operated by [facility]. As such, we want to clarify that our patients should expect to receive two separate bills for the services received at [facility] - one representing the [facility] facility fees as the owner of the establishment, and the other to represent the physician's services and subsequent fees. You can speak with your insurance carrier for a pricing estimate by calling the customer service number on the back of your card and ask about charges for a hospital outpatient visit.  If you do not already have a [employer] account, sign up at: [email or web address]  You can access your medical information, make appointments, see lab results, billing information, and more.  If you have questions regarding this referral, please contact  the [facility] Referrals department at:              [phone]. Monday - Friday 7:30AM - 5:00PM.      Sincerely,  [facility]

## 2025-04-15 NOTE — PROGRESS NOTES
This evaluation was conducted via Teams using secure and encrypted videoconferencing technology. The patient was in their home in the [facility].    The patient's identity was confirmed and verbal consent was obtained for this virtual visit.      PSYCHIATRY FOLLOW-UP NOTE      Name: Lizy Merlos  MRN: [id number]  : 1955  Age: 70 y.o.  Date of assessment: 4/15/2025  PCP: Norma Holguin M.D.  Persons in attendance: Patient      REASON FOR VISIT/CHIEF COMPLAINT (as stated by Patient):  Lizy Merlos is a 70 y.o., White female, attending follow-up appointment for mood and anxiety management.      HISTORY OF PRESENT ILLNESS:  Lizy Merlos is a 70 y.o. old female with MDD and VANI comes in today for follow up. Patient was last seen 3 months ago, and following treatment planning recommendations were done:  Continue Lexapro 20 mg daily for depression and anxiety management.    Continue Buspirone 15 mg TID for anxiety management.    Initiate psychotherapy  To continue follow-up with primary care physician for management of comorbid medical conditions.    History of Present Illness    The chief complaint is a general decline in health status, characterized by persistent stress, depression, and impatience with others.   She is currently drinking at least 4 to 5 glasses of wine on a daily basis and reports this has resulted in changes in depression, anger and anxiety.  This has led to marital discord. Her , expressing concern over her behavior, had her arrested on 2025, resulting in a 4-day incarceration. The charges included a restraining order and domestic abuse, stemming from an incident where she lost her temper while under the influence of alcohol. Despite these events, her  has expressed a desire for her to return home and seek professional help.  We discussed the impact of alcohol use on her mood and anxiety.  Patient is motivated to stop and agreed to plan of  considering intensive outpatient program.  Agreed with adding naltrexone to reduce craving for alcohol and agreed with tapering alcohol usage over the next 5 days by reducing 1 drink every day and increasing the BuSpar to 20 mg 3 times daily with close monitoring for serotonin syndrome.  Agreed with follow-up in a month to assess if Lexapro needs to be changed.    Discussed that changing medications will not help if alcohol use is not treated first and patient is in agreement.      CURRENT MEDICATIONS:  Current Outpatient Medications   Medication Sig Dispense Refill    fluticasone (FLONASE) 50 MCG/ACT nasal spray Administer 2 Sprays into affected nostril(S) every day. 16 g 1    meloxicam (MOBIC) 15 MG tablet Take 1 Tablet by mouth 1 time a day as needed for Inflammation or Severe Pain. With foods 60 Tablet 0    diclofenac sodium (VOLTAREN) 1 % Gel Apply to 2-4 gram to affected area 4 times daily 150 g 2    estradiol (KENDRA) 0.05 MG/24HR PATCH BIWEEKLY Place 1 Patch on the skin two times a week. 24 Patch 3    progesterone (PROMETRIUM) 100 MG Cap Take 1 Capsule by mouth every day. 90 Capsule 1    busPIRone (BUSPAR) 15 MG tablet Take 1 Tablet by mouth 3 times a day. 270 Tablet 3    simvastatin (ZOCOR) 40 MG Tab Take 1 Tablet by mouth every evening. 90 Tablet 3    omeprazole (PRILOSEC) 20 MG delayed-release capsule Take 1 Capsule by mouth every morning. 90 Capsule 3    albuterol 108 (90 Base) MCG/ACT Aero Soln inhalation aerosol Inhale 2 Puffs every 6 hours as needed for Shortness of Breath. 8.5 g 5    escitalopram (LEXAPRO) 20 MG tablet Take 1 Tablet by mouth every day. 90 Tablet 3     No current facility-administered medications for this visit.       MEDICAL HISTORY  Past Medical History:   Diagnosis Date    Alcohol abuse     Anxiety disorder 09/2019    Arthritis 09/2019    hips, spine, hands    Depression 09/2019    High cholesterol     Liver disease     Restless leg syndrome     Sleep apnea     cpap    Snoring      Urinary incontinence      Past Surgical History:   Procedure Laterality Date    PB REPAIR ACHILLES TENDON,SECONDARY Right 4/20/2023    Procedure: RIGHT ANKLE ACHILLES DEBRIDEMENT AND REPAIR;  Surgeon: Jake Jimenez M.D.;  Location: [facility];  Service: Orthopedics    PB XFER SINGLE DEEP LOW LEG TENDON Right 4/20/2023    Procedure: RIGHT REVISION FLEXOR HALLUCIS LONGUS TRANSFER;  Surgeon: Jake Jimenez M.D.;  Location: [facility];  Service: Orthopedics    PB REMOVAL OF HEEL BONE Right 4/20/2023    Procedure: RIGHT CALCANEAL SPUR EXCISION;  Surgeon: Jake Jimenez M.D.;  Location: [facility];  Service: Orthopedics    PB RELEASE TIB/FIB/ANKLE FLEX TENDON,EA Left 4/20/2023    Procedure: RIGHT PERONEAL TENOLYSIS;  Surgeon: aJke Jimenez M.D.;  Location: [facility];  Service: Orthopedics    PB REPAIR COLLAT ANKLE LIGMNT,SECONDARY Right 4/20/2023    Procedure: RIGHT LATERAL LIGAMENT REPAIR;  Surgeon: Jake Jimenez M.D.;  Location: [facility];  Service: Orthopedics    PB RUDOLPH W/O FACETEC FORAMOT/DSKC 1/2 VRT SEG, CE*  12/18/2020    Procedure: LAMINECTOMY, SPINE, CERVICAL, POSTERIOR APPROACH-STAGE#3 C6-T1 LAMI AND C5-7 FACETECTOMY;  Surgeon: Lavell Rincon M.D.;  Location: [facility];  Service: Neurosurgery    CERVICAL DISK AND FUSION ANTERIOR  12/18/2020    Procedure: DISCECTOMY, SPINE, CERVICAL, ANTERIOR APPROACH, WITH FUSION-STAGE #1 C3-5 DISCECTOMY WITH RECURRENT LARYNGEAL NERVE MONITORING SSEPS;  Surgeon: Lavell Rincon M.D.;  Location: [facility];  Service: Neurosurgery    CORPECTOMY  12/18/2020    Procedure: CORPECTOMY, SPINE-ANTERIOR C4;  Surgeon: Lavell Rincon M.D.;  Location: [facility];  Service: Neurosurgery    POSTERIOR CERVICAL FUSION O-ARM  12/18/2020    Procedure: FUSION, SPINE, CERVICAL, POSTERIOR APPROACH, WITH O-ARM IMAGING GUIDANCE-STAGE #2 C3-C6, C7-T3  LATERAL FUSION;  Surgeon: Lavell Rincon M.D.;  Location: [facility];  Service: Neurosurgery    NECK EXPLORATION Right 12/18/2020    Procedure: EXPLORATION, NECK AND CERVICAL SPINE EXPOSURE;  Surgeon: Lavell Rincon M.D.;  Location: [facility];  Service: Neurosurgery    PB SHLDR ARTHROSCOP,SURG,W/ROTAT CUFF REPB Left 10/1/2019    Procedure: ARTHROSCOPY, SHOULDER, WITH ROTATOR CUFF REPAIR;  Surgeon: Lavell Hooks M.D.;  Location: SURGERY [facility];  Service: Orthopedics    TX SHLDR ARTHROSCOP,PART ACROMIOPLAS Left 10/1/2019    Procedure: DECOMPRESSION, SHOULDER, ARTHROSCOPIC - SUBACROMIAL W/LABRAL DEBRIDEMENT;  Surgeon: Lavell Hooks M.D.;  Location: [facility];  Service: Orthopedics    PB ARTHROSCOPY SHOULDER SURGICAL BICEPS TENODES* Left 10/1/2019    Procedure: ARTHROSCOPY, SHOULDER, WITH BICEPS TENODESIS - W/OPEN SUBPECTORAL TENODESIS, PROCEED AS INDICATED;  Surgeon: Lavell Hooks M.D.;  Location: [facility];  Service: Orthopedics    CERVICAL DISK AND FUSION ANTERIOR  1993 and 1994    PRIMARY C SECTION      twice     SHOULDER SURGERY Left     Rotator cuff    SINUSCOPE      TONSILLECTOMY           PAST PSYCHIATRIC MEDICATIONS  Lexapro  Mirtazapine-increased food craving  Buspar, Gabapentin  Do not remember names of other medication trials        REVIEW OF SYSTEMS:        Constitutional negative   Eyes negative   Ears/Nose/Mouth/Throat negative   Cardiovascular negative   Respiratory negative   Gastrointestinal negative   Genitourinary negative   Muscular  chronic back pain   Integumentary negative   Neurological negative   Endocrine negative   Hematologic/Lymphatic negative     PHYSICAL EXAMINAION:  Vital signs: LMP  (LMP Unknown)   Musculoskeletal: Normal gait.   Abnormal movements: none      MENTAL STATUS EXAMINATION      General:   - Grooming and hygiene: Casual,   - Apparent distress: tense,   - Behavior: Tense  - Eye Contact:   Good,   - no psychomotor agitation or retardation   - Participation: Active verbal participation  Orientation: Alert and Fully Oriented to person, place and time  Mood: Depressed and Anxious  Affect: Flexible and Full range,  Thought Process: Logical and Goal-directed  Thought Content: Denies suicidal or homicidal ideations, intent or plan   Perception: Denies auditory or visual hallucinations. No delusions noted   Attention span and concentration: Intact   Speech:Rate within normal limits and Volume within normal limits  Language: Appropriate   Insight: Good  Judgment: Good  Recent and remote memory: No gross evidence of memory deficits        DEPRESSION SCREENIN/22/2024    11:00 AM 2025     8:00 AM 4/15/2025     8:30 AM   Depression Screen (PHQ-2/PHQ-9)   PHQ-2 Total Score 1 3 0   PHQ-9 Total Score 8 11        Interpretation of PHQ-9 Total Score   Score Severity   1-4 No Depression   5-9 Mild Depression   10-14 Moderate Depression   15-19 Moderately Severe Depression   20-27 Severe Depression    CURRENT RISK:       Suicidal: Low       Homicidal: Low       Self-Harm: Low       Relapse: Low       Crisis Safety Plan Reviewed Not Indicated       If evidence of imminent risk is present, intervention/plan:      MEDICAL RECORDS/LABS/DIAGNOSTIC TESTS REVIEWED:  No new lab since last visit     [place]  records -   Reviewed     PLAN:  (1) MDD (2) VANI (3) Alcohol abuse  Increase in alcohol use with mood and anxiety changes. PHQ9: 14; GAD7: 19  Continue Lexapro 20 mg daily for depression and anxiety management.    Increase Buspirone 20 mg TID for anxiety management.    Add Naltrexone 50 mg daily for alcohol abuse.  Stop Alcohol use.  Initiate referral to [facility] for alcohol abuse management.  To continue follow-up with primary care physician for management of comorbid medical conditions.  Medication options, alternatives (including no medications) and medication risks/benefits/side effects were discussed in  detail.  Explained importance of contraceptive measures while on psychotropic medications, educated to let provider know if ever pregnant or wanting to become pregnant. Verbalized understanding.  The patient was advised to call, message provider on MyChart, or come in to the clinic if symptoms worsen or if any future questions/issues regarding their medications arise; the patient verbalized understanding and agreement.    The patient was educated to call 911, call the suicide hotline, or go to local ER if having thoughts of suicide or homicide; verbalized understanding.      Billing Coding based on:  47473: based on referral to higher level of care for alcohol abuse management with associated mood and anxiety changes.    Return to clinic in 1 month or sooner if symptoms worsen.  Next Appointment: instruction provided on how to make the next appointment.     The proposed treatment plan was discussed with the patient who was provided the opportunity to ask questions and make suggestions regarding alternative treatment. Patient verbalized understanding and expressed agreement with the plan.       Darian Pathak M.D.  04/15/25    This note was created using voice recognition software (Dragon). The accuracy of the dictation is limited by the abilities of the software. I have reviewed the note prior to signing, however some errors in grammar and context are still possible. If you have any questions related to this note please do not hesitate to contact our office.

## 2025-04-25 ENCOUNTER — HOSPITAL ENCOUNTER (OUTPATIENT)
Dept: BEHAVIORAL HEALTH | Facility: MEDICAL CENTER | Age: 70
End: 2025-04-25
Attending: PSYCHIATRY & NEUROLOGY
Payer: COMMERCIAL

## 2025-04-25 ENCOUNTER — HOSPITAL ENCOUNTER (OUTPATIENT)
Dept: RADIOLOGY | Facility: MEDICAL CENTER | Age: 70
End: 2025-04-25
Attending: FAMILY MEDICINE
Payer: COMMERCIAL

## 2025-04-25 DIAGNOSIS — F10.20 ALCOHOL USE DISORDER, SEVERE, DEPENDENCE (HCC): ICD-10-CM

## 2025-04-25 DIAGNOSIS — Z87.891 HISTORY OF NICOTINE DEPENDENCE: ICD-10-CM

## 2025-04-25 DIAGNOSIS — F41.1 GAD (GENERALIZED ANXIETY DISORDER): ICD-10-CM

## 2025-04-25 DIAGNOSIS — F33.1 MDD (MAJOR DEPRESSIVE DISORDER), RECURRENT EPISODE, MODERATE (HCC): ICD-10-CM

## 2025-04-25 DIAGNOSIS — Z87.891 FORMER SMOKER: ICD-10-CM

## 2025-04-25 PROCEDURE — 90791 PSYCH DIAGNOSTIC EVALUATION: CPT | Performed by: MARRIAGE & FAMILY THERAPIST

## 2025-04-25 PROCEDURE — 71271 CT THORAX LUNG CANCER SCR C-: CPT

## 2025-04-25 ASSESSMENT — ANXIETY QUESTIONNAIRES
3. WORRYING TOO MUCH ABOUT DIFFERENT THINGS: NEARLY EVERY DAY
5. BEING SO RESTLESS THAT IT IS HARD TO SIT STILL: MORE THAN HALF THE DAYS
6. BECOMING EASILY ANNOYED OR IRRITABLE: NEARLY EVERY DAY
4. TROUBLE RELAXING: MORE THAN HALF THE DAYS
1. FEELING NERVOUS, ANXIOUS, OR ON EDGE: NEARLY EVERY DAY
GAD7 TOTAL SCORE: 18
2. NOT BEING ABLE TO STOP OR CONTROL WORRYING: MORE THAN HALF THE DAYS
7. FEELING AFRAID AS IF SOMETHING AWFUL MIGHT HAPPEN: NEARLY EVERY DAY

## 2025-04-25 ASSESSMENT — PATIENT HEALTH QUESTIONNAIRE - PHQ9
CLINICAL INTERPRETATION OF PHQ2 SCORE: 5
5. POOR APPETITE OR OVEREATING: 1 - SEVERAL DAYS
SUM OF ALL RESPONSES TO PHQ QUESTIONS 1-9: 19
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
## 2025-04-28 ENCOUNTER — RESULTS FOLLOW-UP (OUTPATIENT)
Dept: MEDICAL GROUP | Facility: MEDICAL CENTER | Age: 70
End: 2025-04-28
Payer: COMMERCIAL

## 2025-04-28 DIAGNOSIS — R91.1 LUNG NODULE: ICD-10-CM

## 2025-04-28 NOTE — Clinical Note
REFERRAL APPROVAL NOTICE         Sent on April 28, 2025                   Lizy Merlos  [address] [place] [address]                   Dear Ms. Nat Merlos,    After a careful review of the medical information and benefit coverage, [facility] has processed your referral. See below for additional details.    If applicable, you must be actively enrolled with your insurance for coverage of the authorized service. If you have any questions regarding your coverage, please contact your insurance directly.    REFERRAL INFORMATION   Referral #:  [id number]  Referred-To Department    Referred-By Provider:  Behavioral Health    MARI Massey, [facility]   [facility]      [address]  [place] [id number] [phone] [address], [address]  [place] [id number] [phone]    Referral Start Date:  04/25/2025  Referral End Date:   04/25/2026           SCHEDULING  If you do not already have an appointment, please call [phone] to make an appointment.   MORE INFORMATION  As a reminder, [facility] ownership has changed, meaning this location is now owned and operated by [facility]. As such, we want to clarify that our patients should expect to receive two separate bills for the services received at [facility] - one representing the [facility] facility fees as the owner of the establishment, and the other to represent the physician's services and subsequent fees. You can speak with your insurance carrier for a pricing estimate by calling the customer service number on the back of your card and ask about charges for a hospital outpatient visit.  If you do not already have a [employer] account, sign up at: [email or web address]  You can access your medical information, make appointments, see lab results, billing information, and more.  If you have questions regarding this referral, please contact  the [facility] Referrals  department at:             [phone]. Monday - Friday 7:30AM - 5:00PM.      Sincerely,  [facility]

## 2025-05-05 ENCOUNTER — HOSPITAL ENCOUNTER (OUTPATIENT)
Dept: BEHAVIORAL HEALTH | Facility: MEDICAL CENTER | Age: 70
End: 2025-05-05
Attending: PSYCHIATRY & NEUROLOGY
Payer: COMMERCIAL

## 2025-05-05 DIAGNOSIS — F33.1 MAJOR DEPRESSIVE DISORDER, RECURRENT EPISODE, MODERATE (HCC): ICD-10-CM

## 2025-05-05 DIAGNOSIS — F41.1 GENERALIZED ANXIETY DISORDER: ICD-10-CM

## 2025-05-05 DIAGNOSIS — F10.20 ALCOHOL USE DISORDER, SEVERE, DEPENDENCE (HCC): ICD-10-CM

## 2025-05-05 PROCEDURE — 90853 GROUP PSYCHOTHERAPY: CPT | Performed by: MARRIAGE & FAMILY THERAPIST

## 2025-05-05 NOTE — GROUP NOTE
Group Appointment Information    Date: 05/05/25   Attendance Duration: 60 minutes  Number of Participants: 8 participants  Program / Group: IOP - Intensive Outpatient Program  Topics Covered: Mindfulness      Group Therapy Start Time: 11:00 AM    Attendance: Attended  Participation: Active verbal participation    Affect/Mood Range: Normal range  Affect/Mood Display: CWC - Congruent w/Content  Cognition: Alert and Oriented    Evidence of imminent suicide risk: No   Evidence of imminent homicide risk: No     Therapeutic Interventions: Emotion clarification and Supportive psychotherapy  Progress Toward Treatment Goal: Mild improvement; pt continued to process emotions.

## 2025-05-05 NOTE — GROUP NOTE
"Group Appointment Information    Date: 05/05/25   Attendance Duration: 60 minutes  Number of Participants: 9 participants  Program / Group: IOP - Intensive Outpatient Program  Topics Covered: Commitment to change and Regulating emotions      Group Therapy Start Time:  9:00 AM    Attendance: Attended  Participation: Active verbal participation    Affect/Mood Range: Normal range  Affect/Mood Display: CWC - Congruent w/Content  Cognition: Alert and Oriented    Evidence of imminent suicide risk: No   Evidence of imminent homicide risk: No     Therapeutic Interventions: Psychoeducation and Cognitive clarification  Progress Toward Treatment Goal: Mild improvement; pt will practice using \"awe, mindfulness and the overview effect to help modulate mood and improve well being.   "

## 2025-05-07 ENCOUNTER — HOSPITAL ENCOUNTER (OUTPATIENT)
Dept: BEHAVIORAL HEALTH | Facility: MEDICAL CENTER | Age: 70
End: 2025-05-07
Attending: PSYCHIATRY & NEUROLOGY
Payer: COMMERCIAL

## 2025-05-07 DIAGNOSIS — F10.90 ALCOHOL USE DISORDER: ICD-10-CM

## 2025-05-07 DIAGNOSIS — F41.1 GENERALIZED ANXIETY DISORDER: ICD-10-CM

## 2025-05-07 DIAGNOSIS — F33.1 MAJOR DEPRESSIVE DISORDER, RECURRENT EPISODE, MODERATE (HCC): ICD-10-CM

## 2025-05-07 PROCEDURE — 90853 GROUP PSYCHOTHERAPY: CPT | Performed by: MARRIAGE & FAMILY THERAPIST

## 2025-05-07 NOTE — GROUP NOTE
Group Appointment Information    Date: 05/07/25   Attendance Duration: 60 minutes  Number of Participants: 9 participants  Program / Group: IOP - Intensive Outpatient Program  Topics Covered: Cognitive distortions and Regulating emotions      Group Therapy Start Time: 10:00 AM    Attendance: Attended  Participation: Active verbal participation    Affect/Mood Range: Normal range  Affect/Mood Display: CWC - Congruent w/Content  Cognition: Alert and Oriented    Evidence of imminent suicide risk: No   Evidence of imminent homicide risk: No     Therapeutic Interventions: Emotion clarification and Supportive psychotherapy  Progress Toward Treatment Goal: Mild improvement; pt shared out an was well supported by fellow group members.

## 2025-05-07 NOTE — GROUP NOTE
Group Appointment Information    Date: 05/07/25   Attendance Duration: 60 minutes  Number of Participants: 10 participants  Program / Group: IOP - Intensive Outpatient Program  Topics Covered: Commitment to change      Group Therapy Start Time:  9:00 AM    Attendance: Attended  Participation: Active verbal participation    Affect/Mood Range: Normal range  Affect/Mood Display: CWC - Congruent w/Content  Cognition: Alert and Oriented    Evidence of imminent suicide risk: No   Evidence of imminent homicide risk: No     Therapeutic Interventions: Psychoeducation and Cognitive clarification  Progress Toward Treatment Goal: Mild improvement; pt learned skills from Cognitive Behavioral Therapy of identifying and challenging negative beliefs.

## 2025-05-08 ENCOUNTER — HOSPITAL ENCOUNTER (OUTPATIENT)
Dept: BEHAVIORAL HEALTH | Facility: MEDICAL CENTER | Age: 70
End: 2025-05-08
Attending: PSYCHIATRY & NEUROLOGY
Payer: COMMERCIAL

## 2025-05-08 DIAGNOSIS — F41.1 GAD (GENERALIZED ANXIETY DISORDER): ICD-10-CM

## 2025-05-08 DIAGNOSIS — F10.20 ALCOHOL USE DISORDER, SEVERE, DEPENDENCE (HCC): ICD-10-CM

## 2025-05-08 PROCEDURE — 90853 GROUP PSYCHOTHERAPY: CPT | Performed by: MARRIAGE & FAMILY THERAPIST

## 2025-05-08 NOTE — GROUP NOTE
Group Appointment Information    Date: 05/08/25   Attendance Duration: 60 minutes  Number of Participants: 7 participants  Program / Group: IOP - Intensive Outpatient Program  Topics Covered: Regulating emotions      Group Therapy Start Time: 10:00 AM    Attendance: Attended  Participation: Active verbal participation    Affect/Mood Range: Normal range  Affect/Mood Display: CWC - Congruent w/Content  Cognition: Alert and Oriented    Evidence of imminent suicide risk: No   Evidence of imminent homicide risk: No     Therapeutic Interventions: Emotion clarification and Supportive psychotherapy  Progress Toward Treatment Goal: Mild improvement

## 2025-05-08 NOTE — GROUP NOTE
Group Appointment Information    Date: 05/07/25   Attendance Duration: 60 minutes  Number of Participants: 9 participants  Program / Group: IOP - Intensive Outpatient Program  Topics Covered: Mindful practice      Group Therapy Start Time: 11:00 AM    Attendance: Attended  Participation: Active verbal participation    Affect/Mood Range: Normal range  Affect/Mood Display: CWC - Congruent w/Content  Cognition: Alert and Oriented    Evidence of imminent suicide risk: No   Evidence of imminent homicide risk: No     Therapeutic Interventions: Emotion clarification and Supportive psychotherapy  Progress Toward Treatment Goal: Moderate improvement; pt. Continued to process emotions.

## 2025-05-08 NOTE — GROUP NOTE
Group Appointment Information    Date: 05/08/25   Attendance Duration: 60 minutes  Number of Participants: 7 participants  Program / Group: IOP - Intensive Outpatient Program  Topics Covered: Stress Management      Group Therapy Start Time:  9:00 AM    Attendance: Attended  Participation: Active verbal participation    Affect/Mood Range: Normal range  Affect/Mood Display: CWC - Congruent w/Content  Cognition: Alert and Oriented    Evidence of imminent suicide risk: No   Evidence of imminent homicide risk: No     Therapeutic Interventions: Psychoeducation and Cognitive clarification  Progress Toward Treatment Goal: Mild improvement; pt learned how self-compassion can help with mental wellbeing and how to practice it.

## 2025-05-08 NOTE — GROUP NOTE
Group Appointment Information    Date: 05/08/25   Attendance Duration: 60 minutes  Number of Participants: 7 participants  Program / Group: IOP - Intensive Outpatient Program  Topics Covered: Regulating emotions      Group Therapy Start Time: 11:00 AM    Attendance: Attended  Participation: Active verbal participation    Affect/Mood Range: Normal range  Affect/Mood Display: CWC - Congruent w/Content  Cognition: Alert and Oriented    Evidence of imminent suicide risk: No   Evidence of imminent homicide risk: No     Therapeutic Interventions: Emotion clarification and Supportive psychotherapy  Progress Toward Treatment Goal: Mild improvement; pt continued to process emotions.

## 2025-05-12 ENCOUNTER — HOSPITAL ENCOUNTER (OUTPATIENT)
Dept: BEHAVIORAL HEALTH | Facility: MEDICAL CENTER | Age: 70
End: 2025-05-12
Attending: PSYCHIATRY & NEUROLOGY
Payer: COMMERCIAL

## 2025-05-12 DIAGNOSIS — F41.1 GAD (GENERALIZED ANXIETY DISORDER): ICD-10-CM

## 2025-05-12 DIAGNOSIS — F10.20 ALCOHOL USE DISORDER, SEVERE, DEPENDENCE (HCC): ICD-10-CM

## 2025-05-12 PROCEDURE — 90853 GROUP PSYCHOTHERAPY: CPT | Performed by: MARRIAGE & FAMILY THERAPIST

## 2025-05-12 NOTE — GROUP NOTE
Group Appointment Information    Date: 05/12/25   Attendance Duration: 60 minutes  Number of Participants: 10 participants  Program / Group: IOP - Intensive Outpatient Program  Topics Covered: Stress Management      Group Therapy Start Time:  9:00 AM    Attendance: Attended  Participation: Active verbal participation    Affect/Mood Range: Normal range  Affect/Mood Display: CWC - Congruent w/Content  Cognition: Alert and Oriented    Evidence of imminent suicide risk: No   Evidence of imminent homicide risk: No     Therapeutic Interventions: Emotion clarification and Supportive psychotherapy  Progress Toward Treatment Goal: Mild improvement; pt learned how to identify and process strong emotions for improved mental well-being.

## 2025-05-13 NOTE — GROUP NOTE
Group Appointment Information    Date: 05/12/25   Attendance Duration: 60 minutes  Number of Participants: 10 participants  Program / Group: IOP - Intensive Outpatient Program  Topics Covered: Regulating emotions      Group Therapy Start Time: 11:00 AM    Attendance: Attended  Participation: Active verbal participation    Affect/Mood Range: Normal range  Affect/Mood Display: CWC - Congruent w/Content  Cognition: Alert and Oriented    Evidence of imminent suicide risk: No   Evidence of imminent homicide risk: No     Therapeutic Interventions: Emotion clarification and Supportive psychotherapy  Progress Toward Treatment Goal: Mild improvement;

## 2025-05-13 NOTE — GROUP NOTE
Group Appointment Information    Date: 05/12/25   Attendance Duration: 60 minutes  Number of Participants: 10 participants  Program / Group: IOP - Intensive Outpatient Program  Topics Covered: Regulating emotions      Group Therapy Start Time: 10:00 AM    Attendance: Attended  Participation: Active verbal participation    Affect/Mood Range: Normal range  Affect/Mood Display: CWC - Congruent w/Content  Cognition: Alert and Oriented    Evidence of imminent suicide risk: No   Evidence of imminent homicide risk: No     Therapeutic Interventions: Emotion clarification and Supportive psychotherapy  Progress Toward Treatment Goal: Mild improvement; pt shared out and was well supported by fellow pts.

## 2025-05-14 ENCOUNTER — HOSPITAL ENCOUNTER (OUTPATIENT)
Dept: BEHAVIORAL HEALTH | Facility: MEDICAL CENTER | Age: 70
End: 2025-05-14
Attending: PSYCHIATRY & NEUROLOGY
Payer: COMMERCIAL

## 2025-05-14 DIAGNOSIS — F33.1 MAJOR DEPRESSIVE DISORDER, RECURRENT EPISODE, MODERATE (HCC): ICD-10-CM

## 2025-05-14 DIAGNOSIS — F41.1 GAD (GENERALIZED ANXIETY DISORDER): ICD-10-CM

## 2025-05-14 DIAGNOSIS — F10.90 ALCOHOL USE DISORDER: Primary | ICD-10-CM

## 2025-05-14 PROCEDURE — 90853 GROUP PSYCHOTHERAPY: CPT | Performed by: MARRIAGE & FAMILY THERAPIST

## 2025-05-14 NOTE — GROUP NOTE
Group Appointment Information    Date: 05/14/25   Attendance Duration: 60 minutes  Number of Participants: 7 participants  Program / Group: IOP - Intensive Outpatient Program  Topics Covered: Regulating emotions      Group Therapy Start Time:  9:00 AM    Attendance: Attended  Participation: Active verbal participation    Affect/Mood Range: Normal range  Affect/Mood Display: CWC - Congruent w/Content  Cognition: Alert and Oriented    Evidence of imminent suicide risk: No   Evidence of imminent homicide risk: No     Therapeutic Interventions: Psychoeducation and Cognitive clarification  Progress Toward Treatment Goal: Mild improvement; pt learned concepts of negativity bias from ACT and how to notice when we are buying in to negative thoughts that cause us to suffer.

## 2025-05-15 ENCOUNTER — HOSPITAL ENCOUNTER (OUTPATIENT)
Dept: BEHAVIORAL HEALTH | Facility: MEDICAL CENTER | Age: 70
End: 2025-05-15
Attending: PSYCHIATRY & NEUROLOGY
Payer: COMMERCIAL

## 2025-05-15 DIAGNOSIS — F41.1 GAD (GENERALIZED ANXIETY DISORDER): ICD-10-CM

## 2025-05-15 DIAGNOSIS — F33.1 MAJOR DEPRESSIVE DISORDER, RECURRENT EPISODE, MODERATE (HCC): ICD-10-CM

## 2025-05-15 DIAGNOSIS — F10.20 ALCOHOL USE DISORDER, SEVERE, DEPENDENCE (HCC): Primary | ICD-10-CM

## 2025-05-15 DIAGNOSIS — F10.90 ALCOHOL USE DISORDER: Primary | ICD-10-CM

## 2025-05-15 PROCEDURE — 90853 GROUP PSYCHOTHERAPY: CPT | Performed by: MARRIAGE & FAMILY THERAPIST

## 2025-05-15 PROCEDURE — 90832 PSYTX W PT 30 MINUTES: CPT | Performed by: MARRIAGE & FAMILY THERAPIST

## 2025-05-15 ASSESSMENT — ANXIETY QUESTIONNAIRES
3. WORRYING TOO MUCH ABOUT DIFFERENT THINGS: SEVERAL DAYS
GAD7 TOTAL SCORE: 6
4. TROUBLE RELAXING: SEVERAL DAYS
1. FEELING NERVOUS, ANXIOUS, OR ON EDGE: SEVERAL DAYS
2. NOT BEING ABLE TO STOP OR CONTROL WORRYING: SEVERAL DAYS
7. FEELING AFRAID AS IF SOMETHING AWFUL MIGHT HAPPEN: SEVERAL DAYS
6. BECOMING EASILY ANNOYED OR IRRITABLE: SEVERAL DAYS
5. BEING SO RESTLESS THAT IT IS HARD TO SIT STILL: NOT AT ALL

## 2025-05-15 ASSESSMENT — PATIENT HEALTH QUESTIONNAIRE - PHQ9
5. POOR APPETITE OR OVEREATING: 0 - NOT AT ALL
CLINICAL INTERPRETATION OF PHQ2 SCORE: 2

## 2025-05-15 NOTE — GROUP NOTE
Group Appointment Information    Date: 05/14/25   Attendance Duration: 60 minutes  Number of Participants: 7 participants  Program / Group: IOP - Intensive Outpatient Program  Topics Covered: One Day at at Time      Group Therapy Start Time: 11:00 AM    Attendance: Attended  Participation: Active verbal participation    Affect/Mood Range: Normal range  Affect/Mood Display: CWC - Congruent w/Content  Cognition: Alert and Oriented    Evidence of imminent suicide risk: No   Evidence of imminent homicide risk: No     Therapeutic Interventions: Emotion clarification and Supportive psychotherapy  Progress Toward Treatment Goal: Mild improvement; pt continued to process emotions.

## 2025-05-15 NOTE — GROUP NOTE
Group Appointment Information    Date: 05/15/25   Attendance Duration: 60 minutes  Number of Participants: 7 participants  Program / Group: IOP - Intensive Outpatient Program  Topics Covered: Stress Management      Group Therapy Start Time:  9:00 AM    Attendance: Attended  Participation: Active verbal participation    Affect/Mood Range: Normal range  Affect/Mood Display: CWC - Congruent w/Content  Cognition: Alert and Oriented    Evidence of imminent suicide risk: No   Evidence of imminent homicide risk: No     Therapeutic Interventions: Psychoeducation and Cognitive clarification  Progress Toward Treatment Goal: Moderate improvement; Pt learned how to communicate assertively.

## 2025-05-15 NOTE — GROUP NOTE
Group Appointment Information    Date: 05/14/25   Attendance Duration: 60 minutes  Number of Participants: 7 participants  Program / Group: IOP - Intensive Outpatient Program  Topics Covered: Stress Management      Group Therapy Start Time: 10:00 AM    Attendance: Attended  Participation: Active verbal participation    Affect/Mood Range: Normal range  Affect/Mood Display: CWC - Congruent w/Content  Cognition: Alert and Oriented    Evidence of imminent suicide risk: No   Evidence of imminent homicide risk: No     Therapeutic Interventions: Emotion clarification and Supportive psychotherapy  Progress Toward Treatment Goal: Mild improvement; pt shared out and was well supported by fellow group members.

## 2025-05-15 NOTE — PROGRESS NOTES
" [facility]  Therapy Progress Note    Patient Name: Lizy Merlos  Patient MRN: [id number]  Today's Date: 5/15/2025     Type of session:Individual psychotherapy  Length of session: 30 minutes  Persons in attendance:Patient    Subjective/New Info:   Pt reports she feels she is able to respond to stress with a little more acumen.  \"I had to move some money around and got frustrated and I managed my mood a little better.\"  Pt recalled her first  being obstructionist and her current  behaves similarly.  Pt reports her  had a therapist and he saw the therapist for a few weeks.  Pt and this writer processed how when we drink or use other substances we can tolerate things things that we might not otherwise if we were not imbibing.  So, now that she is not drinking she is more disturbed by her 's behavior.       4/15/2025     8:30 AM 4/25/2025     9:52 AM 5/15/2025     8:24 AM   Depression Screen (PHQ-2/PHQ-9)   PHQ-2 Total Score 0 5 2   PHQ-9 Total Score  19        Interpretation of PHQ-9 Total Score   Score Severity   1-4 No Depression   5-9 Mild Depression   10-14 Moderate Depression   15-19 Moderately Severe Depression   20-27 Severe Depression         4/15/2025     5:09 AM 4/25/2025    10:17 AM 5/15/2025     9:20 AM   VANI 7   VANI-7 Total Score 19  18 6       Patient-reported       Interpretation of VANI 7 Total Score   Score Severity:  0-4 No Anxiety   5-9 Mild Anxiety  10-14 Moderate Anxiety  15-21 Severe Anxiety     Objective/Observations:   Participation: Active verbal participation   Grooming: Casual   Cognition: Alert and Fully Oriented   Eye contact: Good   Mood: Euthymic   Affect: Flexible and Full range   Thought process: Logical   Speech: Rate within normal limits   Other:     Diagnoses:   1. Alcohol use disorder    2. Major depressive disorder, recurrent episode, moderate (HCC)         Current risk:   SUICIDE: Low   Homicide: Low   Self-harm: Low   Relapse: " Low   Other:    Safety Plan reviewed? No   If evidence of imminent risk is present, intervention/plan:     Therapeutic Intervention(s): Communication skills    Treatment Goal(s)/Objective(s) addressed: Pt is open to learning new skills to tolerate frustration and use assertive communication.      Progress toward Treatment Goals: Mild improvement    Plan:  - Continue Intensive Outpatient Program    MARI Massey, [facility]  5/15/2025

## 2025-05-16 NOTE — GROUP NOTE
Group Appointment Information    Date: 05/15/25   Attendance Duration: 60 minutes  Number of Participants: 7 participants  Program / Group: IOP - Intensive Outpatient Program  Topics Covered: Acceptance      Group Therapy Start Time: 11:00 AM    Attendance: Attended  Participation: Active verbal participation    Affect/Mood Range: Normal range  Affect/Mood Display: CWC - Congruent w/Content  Cognition: Alert and Oriented    Evidence of imminent suicide risk: No   Evidence of imminent homicide risk: No     Therapeutic Interventions: Emotion clarification and Supportive psychotherapy  Progress Toward Treatment Goal: Mild improvement; pt continued to process emotions.

## 2025-05-16 NOTE — GROUP NOTE
Group Appointment Information    Date: 05/15/25   Attendance Duration: 60 minutes  Number of Participants: 7 participants  Program / Group: IOP - Intensive Outpatient Program  Topics Covered: Self Affirmation      Group Therapy Start Time: 10:00 AM    Attendance: Attended  Participation: Active verbal participation    Affect/Mood Range: Normal range  Affect/Mood Display: CWC - Congruent w/Content  Cognition: Alert and Oriented    Evidence of imminent suicide risk: No   Evidence of imminent homicide risk: No     Therapeutic Interventions: Emotion clarification and Supportive psychotherapy  Progress Toward Treatment Goal: Mild improvement; pt shared out and was well supported by fellow group members.

## 2025-05-19 ENCOUNTER — HOSPITAL ENCOUNTER (OUTPATIENT)
Dept: BEHAVIORAL HEALTH | Facility: MEDICAL CENTER | Age: 70
End: 2025-05-19
Attending: PSYCHIATRY & NEUROLOGY
Payer: COMMERCIAL

## 2025-05-19 DIAGNOSIS — F33.1 MAJOR DEPRESSIVE DISORDER, RECURRENT EPISODE, MODERATE (HCC): ICD-10-CM

## 2025-05-19 DIAGNOSIS — F41.1 GAD (GENERALIZED ANXIETY DISORDER): ICD-10-CM

## 2025-05-19 DIAGNOSIS — F10.90 ALCOHOL USE DISORDER: Primary | ICD-10-CM

## 2025-05-19 PROCEDURE — 90853 GROUP PSYCHOTHERAPY: CPT | Performed by: MARRIAGE & FAMILY THERAPIST

## 2025-05-19 NOTE — GROUP NOTE
Group Appointment Information    Date: 05/19/25   Attendance Duration: 60 minutes  Number of Participants: 9 participants  Program / Group: IOP - Intensive Outpatient Program  Topics Covered: Stress Management and Regulating emotions      Group Therapy Start Time:  9:00 AM    Attendance: Attended  Participation: Active verbal participation    Affect/Mood Range: Normal range  Affect/Mood Display: CWC - Congruent w/Content  Cognition: Alert and Oriented    Evidence of imminent suicide risk: No   Evidence of imminent homicide risk: No     Therapeutic Interventions: Psychoeducation and Cognitive clarification  Progress Toward Treatment Goal: Moderate improvement; pt learned family components (co-dependency) in the development and sustaining of a substance use disorder and how to improve recovery from this lens.

## 2025-05-20 NOTE — GROUP NOTE
Group Appointment Information    Date: 05/19/25   Attendance Duration: 60 minutes  Number of Participants: 9 participants  Program / Group: IOP - Intensive Outpatient Program  Topics Covered: Stress Management      Group Therapy Start Time: 10:00 AM    Attendance: Attended  Participation: Active verbal participation    Affect/Mood Range: Normal range  Affect/Mood Display: CWC - Congruent w/Content  Cognition: Alert and Oriented    Evidence of imminent suicide risk: No   Evidence of imminent homicide risk: No     Therapeutic Interventions: Emotion clarification and Supportive psychotherapy  Progress Toward Treatment Goal: Mild improvement; pt shared out and was well supported by fellow group members.

## 2025-05-20 NOTE — GROUP NOTE
Group Appointment Information    Date: 05/19/25   Attendance Duration: 60 minutes  Number of Participants: 9 participants  Program / Group: IOP - Intensive Outpatient Program  Topics Covered: Mindfulness      Group Therapy Start Time: 11:00 AM    Attendance: Attended  Participation: Active verbal participation    Affect/Mood Range: Normal range  Affect/Mood Display: CWC - Congruent w/Content  Cognition: Alert and Oriented    Evidence of imminent suicide risk: No   Evidence of imminent homicide risk: No     Therapeutic Interventions: Emotion clarification and Supportive psychotherapy  Progress Toward Treatment Goal: Significant improvement;  pt continued to process emotions.

## 2025-05-21 ENCOUNTER — HOSPITAL ENCOUNTER (OUTPATIENT)
Dept: BEHAVIORAL HEALTH | Facility: MEDICAL CENTER | Age: 70
End: 2025-05-21
Attending: PSYCHIATRY & NEUROLOGY
Payer: COMMERCIAL

## 2025-05-21 DIAGNOSIS — F41.1 GAD (GENERALIZED ANXIETY DISORDER): ICD-10-CM

## 2025-05-21 DIAGNOSIS — F10.20 ALCOHOL USE DISORDER, SEVERE, DEPENDENCE (HCC): Primary | ICD-10-CM

## 2025-05-21 PROCEDURE — 90791 PSYCH DIAGNOSTIC EVALUATION: CPT | Performed by: MARRIAGE & FAMILY THERAPIST

## 2025-05-21 PROCEDURE — 90853 GROUP PSYCHOTHERAPY: CPT | Performed by: MARRIAGE & FAMILY THERAPIST

## 2025-05-21 NOTE — GROUP NOTE
Group Appointment Information    Date: 05/21/25   Attendance Duration: 60 minutes  Number of Participants: 10 participants  Program / Group: IOP - Intensive Outpatient Program  Topics Covered: Stress Management      Group Therapy Start Time: 10:00 AM    Attendance: Attended  Participation: Supportive to other group members    Affect/Mood Range: Normal range  Affect/Mood Display: CWC - Congruent w/Content  Cognition: Alert and Oriented    Evidence of imminent suicide risk: No   Evidence of imminent homicide risk: No     Therapeutic Interventions: Emotion clarification and Supportive psychotherapy  Progress Toward Treatment Goal: Significant improvement; pt shared out and was well supported by fellow group members.

## 2025-05-21 NOTE — GROUP NOTE
Group Appointment Information    Date: 05/21/25   Attendance Duration: 60 minutes  Number of Participants: 9 participants  Program / Group: IOP - Intensive Outpatient Program  Topics Covered: Regulating emotions      Group Therapy Start Time:  9:00 AM    Attendance: Attended  Participation: Active verbal participation    Affect/Mood Range: Normal range  Affect/Mood Display: CWC - Congruent w/Content  Cognition: Alert and Oriented    Evidence of imminent suicide risk: No   Evidence of imminent homicide risk: No     Therapeutic Interventions: Emotion clarification and Supportive psychotherapy  Progress Toward Treatment Goal: Mild improvement

## 2025-05-22 ENCOUNTER — HOSPITAL ENCOUNTER (OUTPATIENT)
Dept: BEHAVIORAL HEALTH | Facility: MEDICAL CENTER | Age: 70
End: 2025-05-22
Attending: PSYCHIATRY & NEUROLOGY
Payer: COMMERCIAL

## 2025-05-22 DIAGNOSIS — F33.1 MAJOR DEPRESSIVE DISORDER, RECURRENT EPISODE, MODERATE (HCC): ICD-10-CM

## 2025-05-22 DIAGNOSIS — F10.21 ALCOHOL USE DISORDER, MODERATE, IN EARLY REMISSION (HCC): Primary | ICD-10-CM

## 2025-05-22 DIAGNOSIS — F41.1 GENERALIZED ANXIETY DISORDER: ICD-10-CM

## 2025-05-22 DIAGNOSIS — F10.90 ALCOHOL USE DISORDER: Primary | ICD-10-CM

## 2025-05-22 PROCEDURE — 90853 GROUP PSYCHOTHERAPY: CPT | Performed by: MARRIAGE & FAMILY THERAPIST

## 2025-05-22 PROCEDURE — 90832 PSYTX W PT 30 MINUTES: CPT | Performed by: MARRIAGE & FAMILY THERAPIST

## 2025-05-22 ASSESSMENT — ANXIETY QUESTIONNAIRES
4. TROUBLE RELAXING: NOT AT ALL
3. WORRYING TOO MUCH ABOUT DIFFERENT THINGS: NOT AT ALL
GAD7 TOTAL SCORE: 3
5. BEING SO RESTLESS THAT IT IS HARD TO SIT STILL: NOT AT ALL
2. NOT BEING ABLE TO STOP OR CONTROL WORRYING: NOT AT ALL
1. FEELING NERVOUS, ANXIOUS, OR ON EDGE: SEVERAL DAYS
7. FEELING AFRAID AS IF SOMETHING AWFUL MIGHT HAPPEN: SEVERAL DAYS
6. BECOMING EASILY ANNOYED OR IRRITABLE: SEVERAL DAYS

## 2025-05-22 ASSESSMENT — PATIENT HEALTH QUESTIONNAIRE - PHQ9
CLINICAL INTERPRETATION OF PHQ2 SCORE: 0
5. POOR APPETITE OR OVEREATING: 0 - NOT AT ALL
SUM OF ALL RESPONSES TO PHQ QUESTIONS 1-9: 0

## 2025-05-22 NOTE — ADDENDUM NOTE
Encounter addended by: MARI Massey, [facility] on: 5/22/2025 9:14 AM   Actions taken: Flowsheet accepted, SmartForm saved, Clinical Note Signed

## 2025-05-22 NOTE — GROUP NOTE
Group Appointment Information    Date: 05/22/25   Attendance Duration: 60 minutes  Number of Participants: 9 participants  Program / Group: IOP - Intensive Outpatient Program  Topics Covered: Stress Management      Group Therapy Start Time: 10:00 AM    Attendance: Attended  Participation: Active verbal participation    Affect/Mood Range: Normal range  Affect/Mood Display: CWC - Congruent w/Content  Cognition: Alert and Oriented    Evidence of imminent suicide risk: No   Evidence of imminent homicide risk: No     Therapeutic Interventions: Emotion clarification and Supportive psychotherapy  Progress Toward Treatment Goal: Mild improvement; pt shared out and was well supported by fellow group members.

## 2025-05-22 NOTE — GROUP NOTE
Group Appointment Information    Date: 05/22/25   Attendance Duration: 60 minutes  Number of Participants: 9 participants  Program / Group: IOP - Intensive Outpatient Program  Topics Covered: Regulating emotions      Group Therapy Start Time: 11:00 AM    Attendance: Attended  Participation: Active verbal participation    Affect/Mood Range: Normal range  Affect/Mood Display: CWC - Congruent w/Content  Cognition: Alert and Oriented    Evidence of imminent suicide risk: No   Evidence of imminent homicide risk: No     Therapeutic Interventions: Emotion clarification and Supportive psychotherapy  Progress Toward Treatment Goal: Mild improvement; t continued to process emotions.

## 2025-05-22 NOTE — GROUP NOTE
Group Appointment Information    Date: 05/21/25   Attendance Duration: 60 minutes  Number of Participants: 10 participants  Program / Group: IOP - Intensive Outpatient Program  Topics Covered: Relationships in Recovery, Stress Management, Dual Diagnosis, and Mindfulness      Group Therapy Start Time: 11:00 AM    Attendance: Attended  Participation: Active verbal participation    Affect/Mood Range: Normal range  Affect/Mood Display: CWC - Congruent w/Content  Cognition: Alert and Oriented    Evidence of imminent suicide risk: No   Evidence of imminent homicide risk: No     Therapeutic Interventions: Emotion clarification and Supportive psychotherapy  Progress Toward Treatment Goal: Significant improvement; pt continued to process emotions.

## 2025-05-22 NOTE — Clinical Note
REFERRAL APPROVAL NOTICE         Sent on May 22, 2025                   Lizy Merlos  [address] [facility] [facility] [place] [address]                   Dear Ms. Nat Merlos,    After a careful review of the medical information and benefit coverage, [facility] has processed your referral. See below for additional details.    If applicable, you must be actively enrolled with your insurance for coverage of the authorized service. If you have any questions regarding your coverage, please contact your insurance directly.    REFERRAL INFORMATION   Referral #:  [id number]  Referred-To Department    Referred-By Provider:  Behavioral [facility]    Francoise Sarabia M.D.   [facility]      [address]  [address]  [facility] [id number]  [phone] [address], [address]  [facility] [id number] [phone]    Referral Start Date:  05/15/2025  Referral End Date:   05/15/2026           SCHEDULING  If you do not already have an appointment, please call [phone] to make an appointment.   MORE INFORMATION  As a reminder, [facility] ownership has changed, meaning this location is now owned and operated by [facility]. As such, we want to clarify that our patients should expect to receive two separate bills for the services received at [facility] - one representing the [facility] facility fees as the owner of the establishment, and the other to represent the physician's services and subsequent fees. You can speak with your insurance carrier for a pricing estimate by calling the customer service number on the back of your card and ask about charges for a hospital outpatient visit.  If you do not already have a [employer] account, sign up at: [email or web address]  You can access your medical information, make appointments, see lab results, billing information, and more.  If you have questions regarding this referral, please contact  the [facility] Referrals department at:              [phone]. Monday - Friday 7:30AM - 5:00PM.      Sincerely,  [facility]

## 2025-05-22 NOTE — PROGRESS NOTES
" [facility]  Therapy Progress Note    Patient Name: Lizy Merlos  Patient MRN: [id number]  Today's Date: 5/22/2025     Type of session:Individual psychotherapy  Length of session: 30 minutes  Persons in attendance:Patient    Subjective/New Info: Pt reports she is practicing more mindfulness (pause, breath and \"wait for it.\") Pt is practicing reminding herself that she and her partner are in a relationship that can be mutually beneficial.  Pt will also be mindful of processing her emotions.  She is also practicing \"I feel statements... leading with noticing and naming her emotions.\"        4/25/2025     9:52 AM 5/15/2025     8:24 AM 5/22/2025     8:25 AM   Depression Screen (PHQ-2/PHQ-9)   PHQ-2 Total Score 5 2 0   PHQ-9 Total Score 19  0     Interpretation of PHQ-9 Total Score   Score Severity   1-4 No Depression   5-9 Mild Depression   10-14 Moderate Depression   15-19 Moderately Severe Depression   20-27 Severe Depression           4/25/2025    10:17 AM 5/15/2025     9:20 AM 5/22/2025     9:11 AM   VANI 7   VANI-7 Total Score 18 6 3     Interpretation of VANI 7 Total Score   Score Severity:  0-4 No Anxiety   5-9 Mild Anxiety  10-14 Moderate Anxiety  15-21 Severe Anxiety     Objective/Observations:   Participation: Active verbal participation   Grooming: Casual   Cognition: Alert and Fully Oriented   Eye contact: Good   Mood: Euthymic   Affect: Flexible and Full range   Thought process: Logical and Goal-directed   Speech: Rate within normal limits and Volume within normal limits   Other:     Diagnoses:   1. Alcohol use disorder, moderate, in early remission (HCC)    2. Major depressive disorder, recurrent episode, moderate (HCC)    3. Generalized anxiety disorder         Current risk:   SUICIDE: Low   Homicide: Low   Self-harm: Low   Relapse: Low   Other:    Safety Plan reviewed? No   If evidence of imminent risk is present, intervention/plan:     Therapeutic Intervention(s): Stressors assessed and " Supportive psychotherapy    Treatment Goal(s)/Objective(s) addressed: Pt is practicing abstinence from alcohol.  Her last drink of alcohol was in April. 11. Pt will continue to abstain from alcohol and notice, name and claim her feelings.        Progress toward Treatment Goals: Significant improvement    Plan:  - Continue Group therapy    MARI Massey, [facility]  5/22/2025

## 2025-05-22 NOTE — GROUP NOTE
Group Appointment Information    Date: 05/22/25   Attendance Duration: 60 minutes  Number of Participants: 9 participants  Program / Group: IOP - Intensive Outpatient Program  Topics Covered: Stress Management      Group Therapy Start Time:  9:00 AM    Attendance: Attended  Participation: Active verbal participation    Affect/Mood Range: Normal range  Affect/Mood Display: CWC - Congruent w/Content  Cognition: Alert and Oriented    Evidence of imminent suicide risk: No   Evidence of imminent homicide risk: No     Therapeutic Interventions: Emotion clarification and Supportive psychotherapy  Progress Toward Treatment Goal: Moderate improvement; pt learned how to manage felt anxiety.

## 2025-05-27 ENCOUNTER — HOSPITAL ENCOUNTER (OUTPATIENT)
Dept: BEHAVIORAL HEALTH | Facility: MEDICAL CENTER | Age: 70
End: 2025-05-27
Attending: PSYCHIATRY & NEUROLOGY
Payer: COMMERCIAL

## 2025-05-27 DIAGNOSIS — F33.1 MAJOR DEPRESSIVE DISORDER, RECURRENT EPISODE, MODERATE (HCC): ICD-10-CM

## 2025-05-27 DIAGNOSIS — F10.90 ALCOHOL USE DISORDER: Primary | ICD-10-CM

## 2025-05-27 PROCEDURE — 90853 GROUP PSYCHOTHERAPY: CPT | Performed by: MARRIAGE & FAMILY THERAPIST

## 2025-05-27 NOTE — GROUP NOTE
Group Appointment Information    Date: 05/27/25   Attendance Duration: 60 minutes  Number of Participants: 10 participants  Program / Group: IOP - Intensive Outpatient Program  Topics Covered: ACT concept intro      Group Therapy Start Time:  9:00 AM    Attendance: Attended  Participation: Active verbal participation    Affect/Mood Range: Normal range  Affect/Mood Display: CWC - Congruent w/Content  Cognition: Alert and Oriented    Evidence of imminent suicide risk: No   Evidence of imminent homicide risk: No     Therapeutic Interventions: Emotion clarification and Supportive psychotherapy  Progress Toward Treatment Goal: Moderate improvement

## 2025-05-27 NOTE — GROUP NOTE
Group Appointment Information    Date: 05/27/25   Attendance Duration: 60 minutes  Number of Participants: 9 participants  Program / Group: IOP - Intensive Outpatient Program  Topics Covered: Regulating emotions      Group Therapy Start Time: 10:00 AM    Attendance: Attended  Participation: Active verbal participation    Affect/Mood Range: Normal range  Affect/Mood Display: CWC - Congruent w/Content  Cognition: Alert and Oriented    Evidence of imminent suicide risk: No   Evidence of imminent homicide risk: No     Therapeutic Interventions: Emotion clarification and Supportive psychotherapy  Progress Toward Treatment Goal: Moderate improvement

## 2025-05-28 ENCOUNTER — HOSPITAL ENCOUNTER (OUTPATIENT)
Dept: BEHAVIORAL HEALTH | Facility: MEDICAL CENTER | Age: 70
End: 2025-05-28
Attending: PSYCHIATRY & NEUROLOGY
Payer: COMMERCIAL

## 2025-05-28 DIAGNOSIS — F10.90 ALCOHOL USE DISORDER: Primary | ICD-10-CM

## 2025-05-28 DIAGNOSIS — F33.1 MAJOR DEPRESSIVE DISORDER, RECURRENT EPISODE, MODERATE (HCC): ICD-10-CM

## 2025-05-28 DIAGNOSIS — F10.90 ALCOHOL USE DISORDER: ICD-10-CM

## 2025-05-28 DIAGNOSIS — F33.1 MAJOR DEPRESSIVE DISORDER, RECURRENT EPISODE, MODERATE (HCC): Primary | ICD-10-CM

## 2025-05-28 DIAGNOSIS — F41.1 GENERALIZED ANXIETY DISORDER: ICD-10-CM

## 2025-05-28 PROCEDURE — 99214 OFFICE O/P EST MOD 30 MIN: CPT | Performed by: PSYCHIATRY & NEUROLOGY

## 2025-05-28 PROCEDURE — 99212 OFFICE O/P EST SF 10 MIN: CPT

## 2025-05-28 PROCEDURE — 90853 GROUP PSYCHOTHERAPY: CPT | Performed by: MARRIAGE & FAMILY THERAPIST
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
## 2025-05-28 NOTE — GROUP NOTE
Group Appointment Information    Date: 05/27/25   Attendance Duration: 60 minutes  Number of Participants: 9 participants  Program / Group: IOP - Intensive Outpatient Program  Topics Covered: Regulating emotions      Group Therapy Start Time: 11:00 AM    Attendance: Attended  Participation: Active verbal participation    Affect/Mood Range: Normal range  Affect/Mood Display: CWC - Congruent w/Content  Cognition: Alert and Oriented    Evidence of imminent suicide risk: No   Evidence of imminent homicide risk: No     Therapeutic Interventions: Emotion clarification and Supportive psychotherapy  Progress Toward Treatment Goal: Mild improvement;pt continued to process emotions.

## 2025-05-28 NOTE — GROUP NOTE
Group Appointment Information    Date: 05/28/25   Attendance Duration: 60 minutes  Number of Participants: 8 participants  Program / Group: Outpatient Therapy  Topics Covered: Anger      Group Therapy Start Time:  9:00 AM    Attendance: Attended  Participation: Active verbal participation    Affect/Mood Range: Normal range  Affect/Mood Display: CWC - Congruent w/Content  Cognition: Alert and Oriented    Evidence of imminent suicide risk: No   Evidence of imminent homicide risk: No     Therapeutic Interventions: Emotion clarification and Supportive psychotherapy  Progress Toward Treatment Goal: Mild improvement; pt learned how to identify and express anger

## 2025-05-29 ENCOUNTER — HOSPITAL ENCOUNTER (OUTPATIENT)
Dept: BEHAVIORAL HEALTH | Facility: MEDICAL CENTER | Age: 70
End: 2025-05-29
Attending: PSYCHIATRY & NEUROLOGY
Payer: COMMERCIAL

## 2025-05-29 DIAGNOSIS — F10.90 ALCOHOL USE DISORDER: Primary | ICD-10-CM

## 2025-05-29 DIAGNOSIS — F33.1 MAJOR DEPRESSIVE DISORDER, RECURRENT EPISODE, MODERATE (HCC): ICD-10-CM

## 2025-05-29 DIAGNOSIS — F41.1 GAD (GENERALIZED ANXIETY DISORDER): ICD-10-CM

## 2025-05-29 PROCEDURE — 90853 GROUP PSYCHOTHERAPY: CPT | Performed by: MARRIAGE & FAMILY THERAPIST

## 2025-05-29 NOTE — GROUP NOTE
Group Appointment Information    Date: 05/29/25   Attendance Duration: 60 minutes  Number of Participants: 10 participants  Program / Group: IOP - Intensive Outpatient Program  Topics Covered: Codependency      Group Therapy Start Time:  9:00 AM    Attendance: Attended  Participation: Active verbal participation    Affect/Mood Range: Normal range  Affect/Mood Display: CWC - Congruent w/Content  Cognition: Alert and Oriented    Evidence of imminent suicide risk: No   Evidence of imminent homicide risk: No     Therapeutic Interventions: Psychoeducation and Cognitive clarification  Progress Toward Treatment Goal: Significant improvement; pt learned how to set authentic interpersonal boundaries for improved mental wellness.

## 2025-05-29 NOTE — GROUP NOTE
Group Appointment Information    Date: 05/28/25   Attendance Duration: 60 minutes  Number of Participants: 9 participants  Program / Group: IOP - Intensive Outpatient Program  Topics Covered: Stress Management      Group Therapy Start Time: 11:00 AM    Attendance: Attended  Participation: Active verbal participation    Affect/Mood Range: Normal range  Affect/Mood Display: CWC - Congruent w/Content  Cognition: Alert and Oriented    Evidence of imminent suicide risk: No   Evidence of imminent homicide risk: No     Therapeutic Interventions: Emotion clarification and Supportive psychotherapy  Progress Toward Treatment Goal: Mild improvement; pt continued to process emotions.

## 2025-05-29 NOTE — GROUP NOTE
Group Appointment Information    Date: 05/29/25   Attendance Duration: 60 minutes  Number of Participants: 10 participants  Program / Group: IOP - Intensive Outpatient Program  Topics Covered: Boundaries and Regulating emotions      Group Therapy Start Time: 10:00 AM    Attendance: Attended  Participation: Active verbal participation    Affect/Mood Range: Normal range  Affect/Mood Display: CWC - Congruent w/Content  Cognition: Alert and Oriented    Evidence of imminent suicide risk: No   Evidence of imminent homicide risk: No     Therapeutic Interventions: Emotion clarification and Supportive psychotherapy  Progress Toward Treatment Goal: Significant improvement; t shared out and was well supported by fellow group members.

## 2025-05-29 NOTE — GROUP NOTE
Group Appointment Information    Date: 05/29/25   Attendance Duration: 60 minutes  Number of Participants: 10 participants  Program / Group: IOP - Intensive Outpatient Program  Topics Covered: Codependency      Group Therapy Start Time: 11:00 AM    Attendance: Attended  Participation: Active verbal participation    Affect/Mood Range: Normal range  Affect/Mood Display: CWC - Congruent w/Content  Cognition: Alert and Oriented    Evidence of imminent suicide risk: No   Evidence of imminent homicide risk: No     Therapeutic Interventions: Emotion clarification and Supportive psychotherapy  Progress Toward Treatment Goal: Mild improvement; pt continued to process emotions.

## 2025-05-29 NOTE — GROUP NOTE
Group Appointment Information    Date: 05/28/25   Attendance Duration: 60 minutes  Number of Participants: 9 participants  Program / Group: IOP - Intensive Outpatient Program  Topics Covered: Other (Comment): managing Anger      Group Therapy Start Time: 10:00 AM    Attendance: Attended  Participation: Active verbal participation    Affect/Mood Range: Normal range  Affect/Mood Display: CWC - Congruent w/Content  Cognition: Alert and Oriented    Evidence of imminent suicide risk: No   Evidence of imminent homicide risk: No     Therapeutic Interventions: Emotion clarification and Supportive psychotherapy  Progress Toward Treatment Goal: Mild improvement; pt learned how self compassion can help with mental wellness.

## 2025-06-02 ENCOUNTER — HOSPITAL ENCOUNTER (OUTPATIENT)
Dept: BEHAVIORAL HEALTH | Facility: MEDICAL CENTER | Age: 70
End: 2025-06-02
Attending: PSYCHIATRY & NEUROLOGY
Payer: COMMERCIAL

## 2025-06-02 DIAGNOSIS — F33.1 MAJOR DEPRESSIVE DISORDER, RECURRENT EPISODE, MODERATE (HCC): ICD-10-CM

## 2025-06-02 DIAGNOSIS — F41.1 GENERALIZED ANXIETY DISORDER: ICD-10-CM

## 2025-06-02 DIAGNOSIS — F10.90 ALCOHOL USE DISORDER: Primary | ICD-10-CM

## 2025-06-02 DIAGNOSIS — F10.21 ALCOHOL USE DISORDER, SEVERE, IN EARLY REMISSION, DEPENDENCE (HCC): Primary | ICD-10-CM

## 2025-06-02 PROCEDURE — 90832 PSYTX W PT 30 MINUTES: CPT | Performed by: MARRIAGE & FAMILY THERAPIST

## 2025-06-02 PROCEDURE — 90853 GROUP PSYCHOTHERAPY: CPT | Performed by: MARRIAGE & FAMILY THERAPIST

## 2025-06-02 ASSESSMENT — ANXIETY QUESTIONNAIRES
4. TROUBLE RELAXING: SEVERAL DAYS
3. WORRYING TOO MUCH ABOUT DIFFERENT THINGS: NOT AT ALL
5. BEING SO RESTLESS THAT IT IS HARD TO SIT STILL: SEVERAL DAYS
GAD7 TOTAL SCORE: 5
2. NOT BEING ABLE TO STOP OR CONTROL WORRYING: SEVERAL DAYS
1. FEELING NERVOUS, ANXIOUS, OR ON EDGE: SEVERAL DAYS
7. FEELING AFRAID AS IF SOMETHING AWFUL MIGHT HAPPEN: NOT AT ALL
6. BECOMING EASILY ANNOYED OR IRRITABLE: SEVERAL DAYS

## 2025-06-02 ASSESSMENT — PATIENT HEALTH QUESTIONNAIRE - PHQ9
CLINICAL INTERPRETATION OF PHQ2 SCORE: 1
SUM OF ALL RESPONSES TO PHQ QUESTIONS 1-9: 3
5. POOR APPETITE OR OVEREATING: 0 - NOT AT ALL

## 2025-06-02 NOTE — GROUP NOTE
Group Appointment Information    Date: 06/02/25   Attendance Duration: 60 minutes  Number of Participants: 9  participants  Program / Group: IOP - Intensive Outpatient Program  Topics Covered: Other (Comment):Values clarification.      Group Therapy Start Time:  9:00 AM    Attendance: Attended  Participation: Active verbal participation    Affect/Mood Range: Normal range  Affect/Mood Display: CWC - Congruent w/Content  Cognition: Alert and Oriented    Evidence of imminent suicide risk: No   Evidence of imminent homicide risk: No     Therapeutic Interventions: Emotion clarification and Supportive psychotherapy  Progress Toward Treatment Goal: Mild improvement; pt engaged in a values clarification activity and learned the benefit to aligning values  to actions.

## 2025-06-02 NOTE — GROUP NOTE
Group Appointment Information    Date: 06/02/25   Attendance Duration: 60 minutes  Number of Participants: 9 participants  Program / Group: IOP - Intensive Outpatient Program  Topics Covered: Regulating emotions      Group Therapy Start Time: 11:00 AM    Attendance: Attended  Participation: Active verbal participation    Affect/Mood Range: Normal range  Affect/Mood Display: CWC - Congruent w/Content  Cognition: Alert and Oriented    Evidence of imminent suicide risk: No   Evidence of imminent homicide risk: No     Therapeutic Interventions: Emotion clarification and Supportive psychotherapy  Progress Toward Treatment Goal: Moderate improvement; pt continued to process emotions.

## 2025-06-02 NOTE — GROUP NOTE
Group Appointment Information    Date: 06/02/25   Attendance Duration: 60 minutes  Number of Participants: 9 participants  Program / Group: IOP - Intensive Outpatient Program  Topics Covered: Other (Comment):values clarification      Group Therapy Start Time: 10:00 AM    Attendance: Attended  Participation: Active verbal participation    Affect/Mood Range: Normal range  Affect/Mood Display: CWC - Congruent w/Content  Cognition: Alert and Oriented    Evidence of imminent suicide risk: No   Evidence of imminent homicide risk: No     Therapeutic Interventions: Emotion clarification and Supportive psychotherapy  Progress Toward Treatment Goal: Mild improvement; pt shared out and was well supported by fellow group members.
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
## 2025-06-04 ENCOUNTER — HOSPITAL ENCOUNTER (OUTPATIENT)
Dept: BEHAVIORAL HEALTH | Facility: MEDICAL CENTER | Age: 70
End: 2025-06-04
Attending: PSYCHIATRY & NEUROLOGY
Payer: COMMERCIAL

## 2025-06-04 DIAGNOSIS — F41.1 GAD (GENERALIZED ANXIETY DISORDER): Primary | ICD-10-CM

## 2025-06-04 DIAGNOSIS — F10.20 ALCOHOL USE DISORDER, SEVERE, DEPENDENCE (HCC): ICD-10-CM

## 2025-06-04 PROCEDURE — 90853 GROUP PSYCHOTHERAPY: CPT | Performed by: MARRIAGE & FAMILY THERAPIST

## 2025-06-04 NOTE — GROUP NOTE
Group Appointment Information    Date: 06/04/25   Attendance Duration: 60 minutes  Number of Participants: 7 participants  Program / Group: IOP - Intensive Outpatient Program  Topics Covered: Care in Relationships      Group Therapy Start Time:  9:00 AM    Attendance: Attended  Participation: Active verbal participation    Affect/Mood Range: Normal range  Affect/Mood Display: CWC - Congruent w/Content  Cognition: Alert and Oriented    Evidence of imminent suicide risk: No   Evidence of imminent homicide risk: No     Therapeutic Interventions: Psychoeducation and Cognitive clarification  Progress Toward Treatment Goal: Moderate improvement

## 2025-06-05 ENCOUNTER — HOSPITAL ENCOUNTER (OUTPATIENT)
Dept: BEHAVIORAL HEALTH | Facility: MEDICAL CENTER | Age: 70
End: 2025-06-05
Attending: PSYCHIATRY & NEUROLOGY
Payer: COMMERCIAL

## 2025-06-05 DIAGNOSIS — F10.90 ALCOHOL USE DISORDER: Primary | ICD-10-CM

## 2025-06-05 DIAGNOSIS — F41.1 GENERALIZED ANXIETY DISORDER: ICD-10-CM

## 2025-06-05 DIAGNOSIS — F33.1 MAJOR DEPRESSIVE DISORDER, RECURRENT EPISODE, MODERATE (HCC): ICD-10-CM

## 2025-06-05 PROCEDURE — 90853 GROUP PSYCHOTHERAPY: CPT | Performed by: MARRIAGE & FAMILY THERAPIST

## 2025-06-05 NOTE — GROUP NOTE
Group Appointment Information    Date: 06/04/25   Attendance Duration: 60 minutes  Number of Participants: 9 participants  Program / Group: IOP - Intensive Outpatient Program  Topics Covered: Regulating emotions      Group Therapy Start Time: 10:00 AM    Attendance: Attended  Participation: Active verbal participation    Affect/Mood Range: Normal range  Affect/Mood Display: CWC - Congruent w/Content  Cognition: Alert and Oriented    Evidence of imminent suicide risk: No   Evidence of imminent homicide risk: No     Therapeutic Interventions: Emotion clarification and Supportive psychotherapy  Progress Toward Treatment Goal: Mild improvement; pt shared out and was well supported by fellow pts.

## 2025-06-05 NOTE — GROUP NOTE
Group Appointment Information    Date: 06/05/25   Attendance Duration: 60 minutes  Number of Participants: 9 participants  Program / Group: IOP - Intensive Outpatient Program  Topics Covered: Caring for Ourselves      Group Therapy Start Time: 10:00 AM    Attendance: Attended  Participation: Active verbal participation    Affect/Mood Range: Normal range  Affect/Mood Display: CWC - Congruent w/Content  Cognition: Alert and Oriented    Evidence of imminent suicide risk: No   Evidence of imminent homicide risk: No     Therapeutic Interventions: Emotion clarification and Supportive psychotherapy  Progress Toward Treatment Goal: Mild improvement; pt shared out and was well supported by fellow pts.

## 2025-06-05 NOTE — GROUP NOTE
Group Appointment Information    Date: 06/05/25   Attendance Duration: 60 minutes  Number of Participants: 9 participants  Program / Group: IOP - Intensive Outpatient Program  Topics Covered: Mindful practice      Group Therapy Start Time:  9:00 AM    Attendance: Attended  Participation: Active verbal participation    Affect/Mood Range: Normal range  Affect/Mood Display: CWC - Congruent w/Content  Cognition: Alert and Oriented    Evidence of imminent suicide risk: No   Evidence of imminent homicide risk: No     Therapeutic Interventions: Psychoeducation and Cognitive clarification  Progress Toward Treatment Goal: Mild improvement; pt learned the role willpower plays in our lives and other ways to manage cravings and unheathful behavior.

## 2025-06-05 NOTE — GROUP NOTE
Group Appointment Information    Date: 06/04/25   Attendance Duration: 60 minutes  Number of Participants: 9 participants  Program / Group: IOP - Intensive Outpatient Program  Topics Covered: Stress Management      Group Therapy Start Time: 11:00 AM    Attendance: Attended  Participation: Active verbal participation and Attentive    Affect/Mood Range: Normal range  Affect/Mood Display: CWC - Congruent w/Content  Cognition: Alert and Oriented    Evidence of imminent suicide risk: No   Evidence of imminent homicide risk: No     Therapeutic Interventions: Psychoeducation and Cognitive clarification  Progress Toward Treatment Goal: Mild improvement; pt. Continued to process emotions.

## 2025-06-06 NOTE — GROUP NOTE
Group Appointment Information    Date: 06/05/25   Attendance Duration: 60 minutes  Number of Participants: 9 participants  Program / Group: IOP - Intensive Outpatient Program  Topics Covered: Caring for Ourselves      Group Therapy Start Time: 11:00 AM    Attendance: Attended  Participation: Active verbal participation    Affect/Mood Range: Normal range  Affect/Mood Display: CWC - Congruent w/Content  Cognition: Alert and Oriented    Evidence of imminent suicide risk: No   Evidence of imminent homicide risk: No     Therapeutic Interventions: Emotion clarification and Supportive psychotherapy  Progress Toward Treatment Goal: Mild improvement; pt continued to process emotions.

## 2025-06-09 ENCOUNTER — HOSPITAL ENCOUNTER (OUTPATIENT)
Dept: BEHAVIORAL HEALTH | Facility: MEDICAL CENTER | Age: 70
End: 2025-06-09
Attending: PSYCHIATRY & NEUROLOGY
Payer: COMMERCIAL

## 2025-06-09 DIAGNOSIS — F10.20 ALCOHOL USE DISORDER, SEVERE, DEPENDENCE (HCC): Primary | ICD-10-CM

## 2025-06-09 DIAGNOSIS — F41.1 GENERALIZED ANXIETY DISORDER: ICD-10-CM

## 2025-06-09 DIAGNOSIS — F33.1 MAJOR DEPRESSIVE DISORDER, RECURRENT EPISODE, MODERATE (HCC): ICD-10-CM

## 2025-06-09 PROCEDURE — 90853 GROUP PSYCHOTHERAPY: CPT | Performed by: MARRIAGE & FAMILY THERAPIST

## 2025-06-10 NOTE — GROUP NOTE
Group Appointment Information    Date: 06/09/25   Attendance Duration: 60 minutes  Number of Participants: 9 participants  Program / Group: IOP - Intensive Outpatient Program  Topics Covered: Regulating emotions      Group Therapy Start Time: 10:00 AM    Attendance: Attended  Participation: Active verbal participation    Affect/Mood Range: Normal range  Affect/Mood Display: CWC - Congruent w/Content  Cognition: Alert and Oriented    Evidence of imminent suicide risk: No   Evidence of imminent homicide risk: No     Therapeutic Interventions: Emotion clarification and Problem-solving  Progress Toward Treatment Goal: Moderate improvement; pt shared out and was well supported by fellow pts.

## 2025-06-10 NOTE — GROUP NOTE
Group Appointment Information    Date: 06/09/25   Attendance Duration: 60 minutes  Number of Participants: 9 participants  Program / Group: IOP - Intensive Outpatient Program  Topics Covered: Stress Management      Group Therapy Start Time: 11:00 AM    Attendance: Attended  Participation: Active verbal participation    Affect/Mood Range: Normal range  Affect/Mood Display: CWC - Congruent w/Content  Cognition: Alert and Oriented    Evidence of imminent suicide risk: No   Evidence of imminent homicide risk: No     Therapeutic Interventions: Emotion clarification and Supportive psychotherapy  Progress Toward Treatment Goal: Moderate improvement; pt continued to process emotions.

## 2025-06-12 ENCOUNTER — HOSPITAL ENCOUNTER (OUTPATIENT)
Dept: BEHAVIORAL HEALTH | Facility: MEDICAL CENTER | Age: 70
End: 2025-06-12
Attending: PSYCHIATRY & NEUROLOGY
Payer: COMMERCIAL

## 2025-06-12 DIAGNOSIS — F10.90 ALCOHOL USE DISORDER: Primary | ICD-10-CM

## 2025-06-12 DIAGNOSIS — F41.1 GAD (GENERALIZED ANXIETY DISORDER): ICD-10-CM

## 2025-06-12 PROCEDURE — 90853 GROUP PSYCHOTHERAPY: CPT | Performed by: MARRIAGE & FAMILY THERAPIST
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
## 2025-06-12 NOTE — GROUP NOTE
Group Appointment Information    Date: 06/12/25   Attendance Duration: 60 minutes  Number of Participants: 9 participants  Program / Group: IOP - Intensive Outpatient Program  Topics Covered: Regulating emotions      Group Therapy Start Time:  9:00 AM    Attendance: Attended  Participation: Active verbal participation    Affect/Mood Range: Normal range  Affect/Mood Display: CWC - Congruent w/Content  Cognition: Alert and Oriented    Evidence of imminent suicide risk: No   Evidence of imminent homicide risk: No     Therapeutic Interventions: Emotion clarification and Supportive psychotherapy  Progress Toward Treatment Goal: Mild improvement pt learned how movement and gut health related to mental well-being and some of the ways we can practice movement for improved wellness.

## 2025-06-13 ENCOUNTER — APPOINTMENT (OUTPATIENT)
Dept: BEHAVIORAL HEALTH | Facility: MEDICAL CENTER | Age: 70
End: 2025-06-13
Attending: PSYCHIATRY & NEUROLOGY
Payer: COMMERCIAL

## 2025-06-13 DIAGNOSIS — F41.1 GAD (GENERALIZED ANXIETY DISORDER): ICD-10-CM

## 2025-06-13 DIAGNOSIS — F10.90 ALCOHOL USE DISORDER: Primary | ICD-10-CM

## 2025-06-13 PROCEDURE — 90832 PSYTX W PT 30 MINUTES: CPT | Performed by: MARRIAGE & FAMILY THERAPIST

## 2025-06-13 NOTE — Clinical Note
REFERRAL APPROVAL NOTICE         Sent on June 13, 2025                   Lizy Merlos  [address] [place] [address]                   Dear Ms. Nat Merlos,    After a careful review of the medical information and benefit coverage, [facility] has processed your referral. See below for additional details.    If applicable, you must be actively enrolled with your insurance for coverage of the authorized service. If you have any questions regarding your coverage, please contact your insurance directly.    REFERRAL INFORMATION   Referral #:  [id number]  Referred-To Department    Referred-By Provider:  Behavioral [facility]    Francoise Sarabia M.D.   [facility]      [address]  [address]  [facility] [address]  [phone] [address], [address]  [facility] [id number] [phone]    Referral Start Date:  06/12/2025  Referral End Date:   06/12/2026           SCHEDULING  If you do not already have an appointment, please call [phone] to make an appointment.   MORE INFORMATION  As a reminder, [facility] ownership has changed, meaning this location is now owned and operated by [facility]. As such, we want to clarify that our patients should expect to receive two separate bills for the services received at [facility] - one representing the [facility] facility fees as the owner of the establishment, and the other to represent the physician's services and subsequent fees. You can speak with your insurance carrier for a pricing estimate by calling the customer service number on the back of your card and ask about charges for a hospital outpatient visit.  If you do not already have a pbs[employer] account, sign up at: [email or web address]  You can access your medical information, make appointments, see lab results, billing information, and more.  If you have questions regarding this referral, please contact  the [facility] Referrals department at:              [phone]. Monday - Friday 7:30AM - 5:00PM.      Sincerely,  [facility]
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
## 2025-06-16 ENCOUNTER — HOSPITAL ENCOUNTER (OUTPATIENT)
Dept: BEHAVIORAL HEALTH | Facility: MEDICAL CENTER | Age: 70
End: 2025-06-16
Attending: PSYCHIATRY & NEUROLOGY
Payer: COMMERCIAL

## 2025-06-16 DIAGNOSIS — F33.1 MAJOR DEPRESSIVE DISORDER, RECURRENT EPISODE, MODERATE (HCC): ICD-10-CM

## 2025-06-16 DIAGNOSIS — F10.90 ALCOHOL USE DISORDER: Primary | ICD-10-CM

## 2025-06-16 PROCEDURE — 90853 GROUP PSYCHOTHERAPY: CPT | Performed by: MARRIAGE & FAMILY THERAPIST

## 2025-06-16 NOTE — GROUP NOTE
"Group Appointment Information    Date: 06/16/25   Attendance Duration: 60 minutes  Number of Participants: 7 participants  Program / Group: IOP - Intensive Outpatient Program  Topics Covered: Caring for Ourselves      Group Therapy Start Time:  9:00 AM    Attendance: Attended  Participation: Active verbal participation    Affect/Mood Range: Normal range  Affect/Mood Display: CWC - Congruent w/Content  Cognition: Alert and Oriented    Evidence of imminent suicide risk: No   Evidence of imminent homicide risk: No     Therapeutic Interventions: Emotion clarification and Supportive psychotherapy  Progress Toward Treatment Goal: Mild improvement; pt learned how \"Awe\"can help with cognitive flexibility and improved mood.   "

## 2025-06-17 NOTE — GROUP NOTE
Group Appointment Information    Date: 06/16/25   Attendance Duration: 60 minutes  Number of Participants: 7 participants  Program / Group: IOP - Intensive Outpatient Program  Topics Covered: Dual Diagnosis      Group Therapy Start Time: 11:00 AM    Attendance: Attended  Participation: Active verbal participation    Affect/Mood Range: Normal range  Affect/Mood Display: CWC - Congruent w/Content  Cognition: Alert and Oriented    Evidence of imminent suicide risk: No   Evidence of imminent homicide risk: No     Therapeutic Interventions: Emotion clarification and Supportive psychotherapy  Progress Toward Treatment Goal: Mild improvement; pt continued to process emotions.

## 2025-06-17 NOTE — GROUP NOTE
Group Appointment Information    Date: 06/16/25   Attendance Duration: 60 minutes  Number of Participants: 7 participants  Program / Group: IOP - Intensive Outpatient Program  Topics Covered: Stress Management and Dual Diagnosis      Group Therapy Start Time: 10:00 AM    Attendance: Attended  Participation: Active verbal participation    Affect/Mood Range: Normal range  Affect/Mood Display: CWC - Congruent w/Content  Cognition: Alert and Oriented    Evidence of imminent suicide risk: No   Evidence of imminent homicide risk: No     Therapeutic Interventions: Emotion clarification and Supportive psychotherapy  Progress Toward Treatment Goal: Mild improvement; pt shared out and was well supported by fellow group members.

## 2025-06-20 ENCOUNTER — TELEPHONE (OUTPATIENT)
Dept: BEHAVIORAL HEALTH | Facility: MEDICAL CENTER | Age: 70
End: 2025-06-20
Payer: COMMERCIAL

## 2025-06-20 NOTE — TELEPHONE ENCOUNTER
[facility]  TRANSFER/DISCHARGE SUMMARY FORM    HHPI / SCP: Lesa Cardenas Ins.:      Patient Name: Lizy Merlos  Admission Date: 25  Level of Care Attended:  IOP : 1955  Transfer/Discharge Date: MRN: [id number]  25       SIGNIFICANT FINDINGS/CLINICAL IMPRESSION:   DSM Codes:   IOP    ICD10 Codes:   F10.20  F33.1  F41.1       Treatment Components in Which Patient Participated (check all that apply):  Education group(s), 1:1 teaching/therapy, Medication Management, and Group Therapy    Summary of Course of Treatment: Pt reported continued abstinence from alcohol during her time in program.  The group supported pt in her recovery from Alcohol Use Disorder and she reported more moments when she felt autonomically regulated.  Toward the end of her participation in program she encountered negative events that caused her great distress.  She did not report relapse except in regard to her relationship with her spouse.  Pt was encouraged to extend her time in program.  Pt was undecided and was lost to contact.  Several calls were placed and no call returned.    Condition at Time of Transfer/Discharge: no return call    Referred to: Pt will benefit from continued participation in program, AA and individual therapy to support gains made while in program including abstinence from alcohol.      Patient is in agreement with discharge plan: n/a    Parminder Shoemaker, MARI, [facility]

## 2025-06-28 ENCOUNTER — HOSPITAL ENCOUNTER (OUTPATIENT)
Dept: LAB | Facility: MEDICAL CENTER | Age: 70
End: 2025-06-28
Attending: FAMILY MEDICINE
Payer: COMMERCIAL

## 2025-06-28 DIAGNOSIS — Z78.9 DAILY CONSUMPTION OF ALCOHOL: ICD-10-CM

## 2025-06-28 DIAGNOSIS — E78.00 HYPERCHOLESTEROLEMIA: ICD-10-CM

## 2025-06-28 LAB
ALBUMIN SERPL BCP-MCNC: 4.3 G/DL (ref 3.2–4.9)
ALBUMIN/GLOB SERPL: 1.7 G/DL
ALP SERPL-CCNC: 71 U/L (ref 30–99)
ALT SERPL-CCNC: 27 U/L (ref 2–50)
ANION GAP SERPL CALC-SCNC: 11 MMOL/L (ref 7–16)
AST SERPL-CCNC: 21 U/L (ref 12–45)
BASOPHILS # BLD AUTO: 0.9 % (ref 0–1.8)
BASOPHILS # BLD: 0.06 K/UL (ref 0–0.12)
BILIRUB SERPL-MCNC: 0.6 MG/DL (ref 0.1–1.5)
BUN SERPL-MCNC: 11 MG/DL (ref 8–22)
CALCIUM ALBUM COR SERPL-MCNC: 9.3 MG/DL (ref 8.5–10.5)
CALCIUM SERPL-MCNC: 9.5 MG/DL (ref 8.5–10.5)
CHLORIDE SERPL-SCNC: 106 MMOL/L (ref 96–112)
CHOLEST SERPL-MCNC: 150 MG/DL (ref 100–199)
CO2 SERPL-SCNC: 23 MMOL/L (ref 20–33)
CREAT SERPL-MCNC: 0.82 MG/DL (ref 0.5–1.4)
EOSINOPHIL # BLD AUTO: 0.15 K/UL (ref 0–0.51)
EOSINOPHIL NFR BLD: 2.3 % (ref 0–6.9)
ERYTHROCYTE [DISTWIDTH] IN BLOOD BY AUTOMATED COUNT: 43.9 FL (ref 35.9–50)
GFR SERPLBLD CREATININE-BSD FMLA CKD-EPI: 77 ML/MIN/1.73 M 2
GLOBULIN SER CALC-MCNC: 2.6 G/DL (ref 1.9–3.5)
GLUCOSE SERPL-MCNC: 93 MG/DL (ref 65–99)
HCT VFR BLD AUTO: 41.6 % (ref 37–47)
HDLC SERPL-MCNC: 48 MG/DL
HGB BLD-MCNC: 14 G/DL (ref 12–16)
IMM GRANULOCYTES # BLD AUTO: 0.05 K/UL (ref 0–0.11)
IMM GRANULOCYTES NFR BLD AUTO: 0.8 % (ref 0–0.9)
LDLC SERPL CALC-MCNC: 73 MG/DL
LYMPHOCYTES # BLD AUTO: 1.06 K/UL (ref 1–4.8)
LYMPHOCYTES NFR BLD: 16 % (ref 22–41)
MCH RBC QN AUTO: 31.3 PG (ref 27–33)
MCHC RBC AUTO-ENTMCNC: 33.7 G/DL (ref 32.2–35.5)
MCV RBC AUTO: 93.1 FL (ref 81.4–97.8)
MONOCYTES # BLD AUTO: 0.54 K/UL (ref 0–0.85)
MONOCYTES NFR BLD AUTO: 8.1 % (ref 0–13.4)
NEUTROPHILS # BLD AUTO: 4.78 K/UL (ref 1.82–7.42)
NEUTROPHILS NFR BLD: 71.9 % (ref 44–72)
NRBC # BLD AUTO: 0 K/UL
NRBC BLD-RTO: 0 /100 WBC (ref 0–0.2)
PLATELET # BLD AUTO: 372 K/UL (ref 164–446)
PMV BLD AUTO: 9 FL (ref 9–12.9)
POTASSIUM SERPL-SCNC: 4.3 MMOL/L (ref 3.6–5.5)
PROT SERPL-MCNC: 6.9 G/DL (ref 6–8.2)
RBC # BLD AUTO: 4.47 M/UL (ref 4.2–5.4)
SODIUM SERPL-SCNC: 140 MMOL/L (ref 135–145)
TRIGL SERPL-MCNC: 143 MG/DL (ref 0–149)
WBC # BLD AUTO: 6.6 K/UL (ref 4.8–10.8)

## 2025-06-28 PROCEDURE — 80061 LIPID PANEL: CPT

## 2025-06-28 PROCEDURE — 80053 COMPREHEN METABOLIC PANEL: CPT

## 2025-06-28 PROCEDURE — 84425 ASSAY OF VITAMIN B-1: CPT

## 2025-06-28 PROCEDURE — 36415 COLL VENOUS BLD VENIPUNCTURE: CPT

## 2025-06-28 PROCEDURE — 85025 COMPLETE CBC W/AUTO DIFF WBC: CPT

## 2025-07-02 LAB — VIT B1 BLD-MCNC: 188 NMOL/L (ref 70–180)

## 2025-07-03 ENCOUNTER — RESULTS FOLLOW-UP (OUTPATIENT)
Dept: MEDICAL GROUP | Facility: MEDICAL CENTER | Age: 70
End: 2025-07-03
Payer: COMMERCIAL

## 2025-07-07 ENCOUNTER — APPOINTMENT (OUTPATIENT)
Dept: URBAN - METROPOLITAN AREA CLINIC 6 | Facility: CLINIC | Age: 70
Setting detail: DERMATOLOGY
End: 2025-07-07

## 2025-07-07 DIAGNOSIS — Z71.89 OTHER SPECIFIED COUNSELING: ICD-10-CM

## 2025-07-07 DIAGNOSIS — L82.1 OTHER SEBORRHEIC KERATOSIS: ICD-10-CM

## 2025-07-07 PROCEDURE — ? PHOTO-DOCUMENTATION

## 2025-07-07 PROCEDURE — ? COUNSELING

## 2025-07-07 ASSESSMENT — LOCATION SIMPLE DESCRIPTION DERM: LOCATION SIMPLE: LEFT FOREARM

## 2025-07-07 ASSESSMENT — LOCATION DETAILED DESCRIPTION DERM: LOCATION DETAILED: LEFT PROXIMAL RADIAL DORSAL FOREARM

## 2025-07-07 ASSESSMENT — LOCATION ZONE DERM: LOCATION ZONE: ARM

## 2025-08-10 DIAGNOSIS — K21.9 GASTROESOPHAGEAL REFLUX DISEASE WITHOUT ESOPHAGITIS: ICD-10-CM

## 2025-08-10 DIAGNOSIS — Z78.9 DAILY CONSUMPTION OF ALCOHOL: ICD-10-CM

## 2025-08-11 RX ORDER — OMEPRAZOLE 20 MG/1
20 CAPSULE, DELAYED RELEASE ORAL EVERY MORNING
Qty: 90 CAPSULE | Refills: 0 | Status: SHIPPED | OUTPATIENT
Start: 2025-08-11

## 2025-08-19 ASSESSMENT — ANXIETY QUESTIONNAIRES
IF YOU CHECKED OFF ANY PROBLEMS ON THIS QUESTIONNAIRE, HOW DIFFICULT HAVE THESE PROBLEMS MADE IT FOR YOU TO DO YOUR WORK, TAKE CARE OF THINGS AT HOME, OR GET ALONG WITH OTHER PEOPLE: SOMEWHAT DIFFICULT
7. FEELING AFRAID AS IF SOMETHING AWFUL MIGHT HAPPEN: MORE THAN HALF THE DAYS
6. BECOMING EASILY ANNOYED OR IRRITABLE: MORE THAN HALF THE DAYS
1. FEELING NERVOUS, ANXIOUS, OR ON EDGE: SEVERAL DAYS
7. FEELING AFRAID AS IF SOMETHING AWFUL MIGHT HAPPEN: MORE THAN HALF THE DAYS
6. BECOMING EASILY ANNOYED OR IRRITABLE: MORE THAN HALF THE DAYS
IF YOU CHECKED OFF ANY PROBLEMS ON THIS QUESTIONNAIRE, HOW DIFFICULT HAVE THESE PROBLEMS MADE IT FOR YOU TO DO YOUR WORK, TAKE CARE OF THINGS AT HOME, OR GET ALONG WITH OTHER PEOPLE: SOMEWHAT DIFFICULT
1. FEELING NERVOUS, ANXIOUS, OR ON EDGE: SEVERAL DAYS

## 2025-08-19 ASSESSMENT — PATIENT HEALTH QUESTIONNAIRE - PHQ9
8. MOVING OR SPEAKING SO SLOWLY THAT OTHER PEOPLE COULD HAVE NOTICED. OR THE OPPOSITE, BEING SO FIGETY OR RESTLESS THAT YOU HAVE BEEN MOVING AROUND A LOT MORE THAN USUAL: 0
3. TROUBLE FALLING OR STAYING ASLEEP OR SLEEPING TOO MUCH: 0
1. LITTLE INTEREST OR PLEASURE IN DOING THINGS: SEVERAL DAYS
6. FEELING BAD ABOUT YOURSELF - OR THAT YOU ARE A FAILURE OR HAVE LET YOURSELF OR YOUR FAMILY DOWN: SEVERAL DAYS
9. THOUGHTS THAT YOU WOULD BE BETTER OFF DEAD, OR OF HURTING YOURSELF: NOT AT ALL
9. THOUGHTS THAT YOU WOULD BE BETTER OFF DEAD, OR OF HURTING YOURSELF: 0
4. FEELING TIRED OR HAVING LITTLE ENERGY: SEVERAL DAYS
7. TROUBLE CONCENTRATING ON THINGS, SUCH AS READING THE NEWSPAPER OR WATCHING TELEVISION: SEVERAL DAYS
8. MOVING OR SPEAKING SO SLOWLY THAT OTHER PEOPLE COULD HAVE NOTICED. OR THE OPPOSITE, BEING SO FIGETY OR RESTLESS THAT YOU HAVE BEEN MOVING AROUND A LOT MORE THAN USUAL: NOT AT ALL
7. TROUBLE CONCENTRATING ON THINGS, SUCH AS READING THE NEWSPAPER OR WATCHING TELEVISION: 1
10. IF YOU CHECKED OFF ANY PROBLEMS, HOW DIFFICULT HAVE THESE PROBLEMS MADE IT FOR YOU TO DO YOUR WORK, TAKE CARE OF THINGS AT HOME, OR GET ALONG WITH OTHER PEOPLE: SOMEWHAT DIFFICULT
SUM OF ALL RESPONSES TO PHQ QUESTIONS 1-9: 6
5. POOR APPETITE OR OVEREATING: 1 - SEVERAL DAYS
2. FEELING DOWN, DEPRESSED, IRRITABLE, OR HOPELESS: SEVERAL DAYS
1. LITTLE INTEREST OR PLEASURE IN DOING THINGS: 1
6. FEELING BAD ABOUT YOURSELF - OR THAT YOU ARE A FAILURE OR HAVE LET YOURSELF OR YOUR FAMILY DOWN: 1
5. POOR APPETITE OR OVEREATING: 1
2. FEELING DOWN, DEPRESSED, IRRITABLE, OR HOPELESS: 1
4. FEELING TIRED OR HAVING LITTLE ENERGY: 1
3. TROUBLE FALLING OR STAYING ASLEEP OR SLEEPING TOO MUCH: NOT AT ALL
5. POOR APPETITE OR OVEREATING: SEVERAL DAYS

## 2025-08-20 ENCOUNTER — TELEMEDICINE (OUTPATIENT)
Dept: BEHAVIORAL HEALTH | Facility: CLINIC | Age: 70
End: 2025-08-20
Payer: COMMERCIAL

## 2025-08-20 DIAGNOSIS — F33.42 RECURRENT MAJOR DEPRESSIVE DISORDER, IN FULL REMISSION (HCC): ICD-10-CM

## 2025-08-20 DIAGNOSIS — F41.1 GAD (GENERALIZED ANXIETY DISORDER): ICD-10-CM

## 2025-08-20 PROCEDURE — 99214 OFFICE O/P EST MOD 30 MIN: CPT | Mod: 95 | Performed by: PSYCHIATRY & NEUROLOGY

## 2025-08-20 RX ORDER — ESCITALOPRAM OXALATE 20 MG/1
20 TABLET ORAL DAILY
Qty: 90 TABLET | Refills: 3 | Status: SHIPPED | OUTPATIENT
Start: 2025-08-20 | End: 2025-08-21

## 2025-08-20 RX ORDER — BUSPIRONE HYDROCHLORIDE 10 MG/1
20 TABLET ORAL 3 TIMES DAILY
Qty: 540 TABLET | Refills: 3 | Status: SHIPPED | OUTPATIENT
Start: 2025-08-20 | End: 2025-11-18

## 2025-08-20 ASSESSMENT — PATIENT HEALTH QUESTIONNAIRE - PHQ9: CLINICAL INTERPRETATION OF PHQ2 SCORE: 0

## 2025-08-21 RX ORDER — ESCITALOPRAM OXALATE 20 MG/1
20 TABLET ORAL DAILY
Qty: 90 TABLET | Refills: 3 | Status: SHIPPED | OUTPATIENT
Start: 2025-08-21

## (undated) DEVICE — BLOCK

## (undated) DEVICE — TRAY SURESTEP FOLEY TEMP SENSING 16FR (10EA/CA) ORDER  #18764 FOR TEMP FOLEY ONLY

## (undated) DEVICE — SPIDER SHOULDER HOLDER (12EA/BX)

## (undated) DEVICE — SUTURE 4-0 MONOCRYL PLUS PS-2 - 27 INCH (36/BX)

## (undated) DEVICE — GLOVE COTTON STERILE (50/CA)

## (undated) DEVICE — MIDAS LUBRICATOR DIFFUSER PACK (4EA/CA)

## (undated) DEVICE — LACTATED RINGERS INJ. 500 ML - (24EA/CA)

## (undated) DEVICE — DRAPETIBURON SHOULDER W/POUCH - (5EA/CA)

## (undated) DEVICE — SHAVER 5.5 RESECTOR FORMULA (5EA/BX )

## (undated) DEVICE — SPONGE GAUZESTER 4 X 4 4PLY - (128PK/CA)

## (undated) DEVICE — NEPTUNE 4 PORT MANIFOLD - (20/PK)

## (undated) DEVICE — Device

## (undated) DEVICE — PROTECTOR ULNA NERVE - (36PR/CA)

## (undated) DEVICE — INTRAOP NEURO IN OR 1:1 PER 15 MIN

## (undated) DEVICE — DECANTER FLD BLS - (50/CA)

## (undated) DEVICE — GLOVE, LITE (PAIR)

## (undated) DEVICE — SUTURE GENERAL

## (undated) DEVICE — BONE MILL BM210

## (undated) DEVICE — TUBING C&T SET FLYING LEADS DRAIN TUBING (10EA/BX)

## (undated) DEVICE — SUTURE 6-0 PROLENE C-1 D/A 24 (36PK/BX)"

## (undated) DEVICE — SLEEVE, VASO, THIGH, MED

## (undated) DEVICE — CHLORAPREP 26 ML APPLICATOR - ORANGE TINT(25/CA)

## (undated) DEVICE — GLOVE BIOGEL SZ 8 SURGICAL PF LTX - (50PR/BX 4BX/CA)

## (undated) DEVICE — TUBING CLEARLINK DUO-VENT - C-FLO (48EA/CA)

## (undated) DEVICE — KIT SURGIFLO W/OUT THROMBIN - (6EA/CA)

## (undated) DEVICE — TUBE CONNECTING SUCTION - CLEAR PLASTIC STERILE 72 IN (50EA/CA)

## (undated) DEVICE — GLOVE BIOGEL PI INDICATOR SZ 6.5 SURGICAL PF LF - (50/BX 4BX/CA)

## (undated) DEVICE — DISSECT TOOL MIDAS REX

## (undated) DEVICE — GOWN SURGEONS X-LARGE - DISP. (30/CA)

## (undated) DEVICE — MASK ANESTHESIA ADULT  - (100/CA)

## (undated) DEVICE — SENSOR SPO2 NEO LNCS ADHESIVE (20/BX) SEE USER NOTES

## (undated) DEVICE — HUMID-VENT HEAT AND MOISTURE EXCHANGE- (50/BX)

## (undated) DEVICE — HEAD HOLDER JUNIOR/ADULT

## (undated) DEVICE — GOWN WARMING STANDARD FLEX - (30/CA)

## (undated) DEVICE — ELECTRODE DUAL RETURN W/ CORD - (50/PK)

## (undated) DEVICE — SET LEADWIRE 5 LEAD BEDSIDE DISPOSABLE ECG (1SET OF 5/EA)

## (undated) DEVICE — SUTURE 3-0 PROLENE PS-1 (12PK/BX)

## (undated) DEVICE — LACTATED RINGERS INJ 1000 ML - (14EA/CA 60CA/PF)

## (undated) DEVICE — PACK NEURO - (2EA/CA)

## (undated) DEVICE — SUTURE 0 VICRYL PLUS CT-1 - 8 X 18 INCH (12/BX)

## (undated) DEVICE — CANISTER SUCTION 3000ML MECHANICAL FILTER AUTO SHUTOFF MEDI-VAC NONSTERILE LF DISP  (40EA/CA)

## (undated) DEVICE — DERMABOND ADVANCED - (12EA/BX)

## (undated) DEVICE — BOVIE BLADE COATED &INSULATED - 25/PK

## (undated) DEVICE — TOWELS CLOTH SURGICAL - (4/PK 20PK/CA)

## (undated) DEVICE — COVER LIGHT HANDLE FLEXIBLE - SOFT (2EA/PK 80PK/CA)

## (undated) DEVICE — CANISTER SUCTION RIGID RED 1500CC (40EA/CA)

## (undated) DEVICE — PIN HEAD MAYFIELD DISP. (3EA/PK 12PK/BX)

## (undated) DEVICE — ELECTRODE 850 FOAM ADHESIVE - HYDROGEL RADIOTRNSPRNT (50/PK)

## (undated) DEVICE — DRAPE LAPAROTOMY T SHEET - (12EA/CA)

## (undated) DEVICE — KIT ROOM DECONTAMINATION

## (undated) DEVICE — SUTURE PRELOADED #2 ULTRABRAID COBRAID (10EA/BX)

## (undated) DEVICE — DRESSING XEROFORM 1X8 - (50/BX 4BX/CA)

## (undated) DEVICE — SOD. CHL. INJ. 0.9% 1000 ML - (14EA/CA 60CA/PF)

## (undated) DEVICE — DRAPE LARGE 3 QUARTER - (20/CA)

## (undated) DEVICE — TUBE CONNECT SUCTION CLEAR 120 X 1/4" (50EA/CA)"

## (undated) DEVICE — BONE WAX (12PK/BX)

## (undated) DEVICE — PENCIL ELECTSURG 10FT BTN SWH - (50/CA)

## (undated) DEVICE — NEEDLE SPINAL NON-SAFETY 18 GA X 3 IN (25EA/BX)

## (undated) DEVICE — BIT DRILL MATCH HEAD MIDAS REX 15-A 2.2MM X 15CM

## (undated) DEVICE — DRESSING POST OP BORDER 4 X 10 (5EA/BX)

## (undated) DEVICE — CANNULA TWIST IN 8.25MM X 7CM (5/BX)

## (undated) DEVICE — GLOVE BIOGEL INDICATOR SZ 8 SURGICAL PF LTX - (50/BX 4BX/CA)

## (undated) DEVICE — SUCTION INSTRUMENT YANKAUER BULBOUS TIP W/O VENT (50EA/CA)

## (undated) DEVICE — SPONGE XRAY 8X4 STERL. 12PL - (10EA/TY 80TY/CA)

## (undated) DEVICE — COVER MAYO STAND X-LG - (22EA/CA)

## (undated) DEVICE — SHAVER4.0 AGGRESSIVE + FORMLA (5EA/BX)

## (undated) DEVICE — SODIUM CHL IRRIGATION 0.9% 1000ML (12EA/CA)

## (undated) DEVICE — KIT ANESTHESIA W/CIRCUIT & 3/LT BAG W/FILTER (20EA/CA)

## (undated) DEVICE — TOOL DISSECTING BALL SYMMETRI MIDAS REX LEGEND 14CM 4MM

## (undated) DEVICE — SPHERE NAVIGATION STEALTH (5EA/TY 12TY/PK)

## (undated) DEVICE — BAG, SPONGE COUNT 50600

## (undated) DEVICE — SPONGE PEANUT - (5/PK 50PK/CA)

## (undated) DEVICE — CLOSURE SKIN STRIP 1/2 X 4 IN - (STERI STRIP) (50/BX 4BX/CA)

## (undated) DEVICE — ARTHROWAND TURBOVAC 3.5/90 SCT

## (undated) DEVICE — DRAPE IOBAN II INCISE 23X17 - (10EA/BX 4BX/CA)

## (undated) DEVICE — KIT DISPOSABLE HIP 2.8MM IMPLANT INCLUDES DRILL DRILL GUIDE AND OBTURATOR

## (undated) DEVICE — SUTURE 5-0 PROLENE BV-1 HEMOSEAL (36PK/BX)

## (undated) DEVICE — SUTURE 3-0 VICRYL PLUS SH - 8X 18 INCH (12/BX)

## (undated) DEVICE — TIP EXTENDED DURAL SEALANT AUTOSPRAY ADHERUS (5EA/PK)

## (undated) DEVICE — SET EXTENSION WITH 2 PORTS (48EA/CA) ***PART #2C8610 IS A SUBSTITUTE*****

## (undated) DEVICE — SODIUM CHL. IRRIGATION 0.9% 3000ML (4EA/CA 65CA/PF)

## (undated) DEVICE — DRAPE SURG STERI-DRAPE 7X11OD - (40EA/CA)

## (undated) DEVICE — GLOVE SZ 6.5 BIOGEL PI MICRO - PF LF (50PR/BX)

## (undated) DEVICE — CLIP MED INTNL HRZN TI ESCP - (25/BX)

## (undated) DEVICE — SHEET PEDIATRIC LAPAROTOMY - (10/CA)

## (undated) DEVICE — DRESSING ABDOMINAL PAD STERILE 8 X 10" (360EA/CA)"

## (undated) DEVICE — GLOVE BIOGEL INDICATOR SZ 7SURGICAL PF LTX - (50/BX 4BX/CA)

## (undated) DEVICE — SUTURE 2-0 VICRYL PLUS SH - 8 X 18 INCH (12/BX)

## (undated) DEVICE — SYRINGE SAFETY 10 ML 18 GA X 1 1/2 BLUNT LL (100/BX 4BX/CA)

## (undated) DEVICE — KIT EVACUATER 3 SPRING PVC LF 1/8 DRAIN SIZE (10EA/CA)"

## (undated) DEVICE — PACK SHOULDER ARTHROSCOPY SM - (2EA/CA)

## (undated) DEVICE — WATER IRRIGATION STERILE 1000ML (12EA/CA)

## (undated) DEVICE — CLIP SM INTNL HRZN TI ESCP LGT - (24EA/PK 25PK/BX)

## (undated) DEVICE — SCREW DISTRACTION 14MM YELLOW - STERILE (10EA/BX) (5TX4=20)

## (undated) DEVICE — GLOVE BIOGEL PI INDICATOR SZ 7.0 SURGICAL PF LF - (50/BX 4BX/CA)